# Patient Record
Sex: FEMALE | Race: WHITE | NOT HISPANIC OR LATINO | Employment: PART TIME | URBAN - METROPOLITAN AREA
[De-identification: names, ages, dates, MRNs, and addresses within clinical notes are randomized per-mention and may not be internally consistent; named-entity substitution may affect disease eponyms.]

---

## 2017-09-19 ENCOUNTER — GENERIC CONVERSION - ENCOUNTER (OUTPATIENT)
Dept: OTHER | Facility: OTHER | Age: 32
End: 2017-09-19

## 2017-10-24 ENCOUNTER — GENERIC CONVERSION - ENCOUNTER (OUTPATIENT)
Dept: OTHER | Facility: OTHER | Age: 32
End: 2017-10-24

## 2017-11-21 ENCOUNTER — ALLSCRIPTS OFFICE VISIT (OUTPATIENT)
Dept: OTHER | Facility: OTHER | Age: 32
End: 2017-11-21

## 2017-11-22 NOTE — PROCEDURES
Assessment  1  Amenorrhea (626 0) (N91 2)    Discussion/Summary  Discussion Summary:   LMP was 09/15/2017, due date is 06/22/2018  Active Problems  1  Encounter for gynecological examination with abnormal finding (V72 31) (Z01 411)   2  Encounter for routine gynecological examination (V72 31) (Z01 419)   3  Migraines (346 90) (G43 909)   4  Oral contraceptive prescribed (V25 01) (Z30 011)   5  Oral contraceptives use   6  Screening for HPV (human papillomavirus) (V73 81) (Z11 51)   7  Vulvar dystrophy (624 09) (N90 4)   8  Vulvar itching (698 1) (L29 2)    Current Meds    1  Butalbital-APAP-Caffeine -40 MG Oral Capsule (Fioricet); 1-2 tabs every 4 - 6 hours as needed for mild to moderate headache; Therapy: 47RWS0860 to (Last NX:12PEY3023)  Requested for: 24Oct2017 Ordered    2  Fioricet -40 MG Oral Capsule (Butalbital-APAP-Caffeine); 1 tab q 6 hrs prn migraine; Therapy: 57UCZ4887 to Recorded    3  Prenatal TABS; Therapy: (Recorded:74Zge3393) to Recorded    Allergies  1  Erythromycin GEL    2  Pollen   3  Seasonal    Results/Data  [address] Transvaginal Ultrasound [facility]:  Procedure: [id number] pregnant uterus real time with image documentation, fetal and maternal evaluation, first trimester (<14 weeks 0 days), transvaginal approach: single or first gestation  -- The study was done today in the office  Indication: EDC gestational age 10 3/6 weeks  Exam indication: amenorrhea  Findings:  Number of gestational sacs and fetuses: one  Gestational sac/fetal measurements appropriate for gestation: CRL is 2 46 cm equals 9 1/7 weeks with due date of 06/25/2018  Survey of visible fetal and placental anatomic structure cardiac motion is present  Qualitative assessment of amniotic fluid volume/gestational sac shape: nl   Exam of maternal uterus and adnexa: nl   Impression: viable IUP        Signatures   Electronically signed by : BARRON Navarrete ; Nov 21 2017  1:48PM EST (Author)

## 2017-11-29 ENCOUNTER — GENERIC CONVERSION - ENCOUNTER (OUTPATIENT)
Dept: OTHER | Facility: OTHER | Age: 32
End: 2017-11-29

## 2017-12-12 ENCOUNTER — APPOINTMENT (OUTPATIENT)
Dept: LAB | Facility: MEDICAL CENTER | Age: 32
End: 2017-12-12
Payer: COMMERCIAL

## 2017-12-12 ENCOUNTER — ALLSCRIPTS OFFICE VISIT (OUTPATIENT)
Dept: OTHER | Facility: OTHER | Age: 32
End: 2017-12-12

## 2017-12-12 ENCOUNTER — GENERIC CONVERSION - ENCOUNTER (OUTPATIENT)
Dept: OTHER | Facility: OTHER | Age: 32
End: 2017-12-12

## 2017-12-12 DIAGNOSIS — Z34.90 ENCOUNTER FOR SUPERVISION OF NORMAL PREGNANCY: ICD-10-CM

## 2017-12-12 LAB
BACTERIA UR QL AUTO: ABNORMAL /HPF
BASOPHILS # BLD AUTO: 0.06 THOUSANDS/ΜL (ref 0–0.1)
BASOPHILS NFR BLD AUTO: 1 % (ref 0–1)
BILIRUB UR QL STRIP: NEGATIVE
CLARITY UR: ABNORMAL
COLOR UR: YELLOW
EOSINOPHIL # BLD AUTO: 0.46 THOUSAND/ΜL (ref 0–0.61)
EOSINOPHIL NFR BLD AUTO: 4 % (ref 0–6)
ERYTHROCYTE [DISTWIDTH] IN BLOOD BY AUTOMATED COUNT: 13 % (ref 11.6–15.1)
GLUCOSE UR STRIP-MCNC: NEGATIVE MG/DL
HCT VFR BLD AUTO: 42.3 % (ref 34.8–46.1)
HGB BLD-MCNC: 14.4 G/DL (ref 11.5–15.4)
HGB UR QL STRIP.AUTO: NEGATIVE
HYALINE CASTS #/AREA URNS LPF: ABNORMAL /LPF
KETONES UR STRIP-MCNC: ABNORMAL MG/DL
LEUKOCYTE ESTERASE UR QL STRIP: ABNORMAL
LYMPHOCYTES # BLD AUTO: 2.18 THOUSANDS/ΜL (ref 0.6–4.47)
LYMPHOCYTES NFR BLD AUTO: 17 % (ref 14–44)
MCH RBC QN AUTO: 30.6 PG (ref 26.8–34.3)
MCHC RBC AUTO-ENTMCNC: 34 G/DL (ref 31.4–37.4)
MCV RBC AUTO: 90 FL (ref 82–98)
MONOCYTES # BLD AUTO: 1.03 THOUSAND/ΜL (ref 0.17–1.22)
MONOCYTES NFR BLD AUTO: 8 % (ref 4–12)
NEUTROPHILS # BLD AUTO: 9.2 THOUSANDS/ΜL (ref 1.85–7.62)
NEUTS SEG NFR BLD AUTO: 70 % (ref 43–75)
NITRITE UR QL STRIP: NEGATIVE
NON-SQ EPI CELLS URNS QL MICRO: ABNORMAL /HPF
NRBC BLD AUTO-RTO: 0 /100 WBCS
PH UR STRIP.AUTO: 7.5 [PH] (ref 4.5–8)
PLATELET # BLD AUTO: 306 THOUSANDS/UL (ref 149–390)
PMV BLD AUTO: 9.7 FL (ref 8.9–12.7)
PROT UR STRIP-MCNC: ABNORMAL MG/DL
RBC # BLD AUTO: 4.7 MILLION/UL (ref 3.81–5.12)
RBC #/AREA URNS AUTO: ABNORMAL /HPF
RUBV IGG SERPL IA-ACNC: >175 IU/ML
SP GR UR STRIP.AUTO: 1.02 (ref 1–1.03)
UROBILINOGEN UR QL STRIP.AUTO: 1 E.U./DL
WBC # BLD AUTO: 12.98 THOUSAND/UL (ref 4.31–10.16)
WBC #/AREA URNS AUTO: ABNORMAL /HPF

## 2017-12-12 PROCEDURE — 87147 CULTURE TYPE IMMUNOLOGIC: CPT

## 2017-12-12 PROCEDURE — 36415 COLL VENOUS BLD VENIPUNCTURE: CPT

## 2017-12-12 PROCEDURE — 87086 URINE CULTURE/COLONY COUNT: CPT

## 2017-12-12 PROCEDURE — 87077 CULTURE AEROBIC IDENTIFY: CPT

## 2017-12-12 PROCEDURE — 80081 OBSTETRIC PANEL INC HIV TSTG: CPT

## 2017-12-12 PROCEDURE — 81001 URINALYSIS AUTO W/SCOPE: CPT

## 2017-12-13 ENCOUNTER — GENERIC CONVERSION - ENCOUNTER (OUTPATIENT)
Dept: OTHER | Facility: OTHER | Age: 32
End: 2017-12-13

## 2017-12-13 ENCOUNTER — LAB REQUISITION (OUTPATIENT)
Dept: LAB | Facility: HOSPITAL | Age: 32
End: 2017-12-13
Payer: COMMERCIAL

## 2017-12-13 DIAGNOSIS — Z34.90 ENCOUNTER FOR SUPERVISION OF NORMAL PREGNANCY: ICD-10-CM

## 2017-12-13 LAB
ABO GROUP BLD: NORMAL
BACTERIA UR CULT: ABNORMAL
BACTERIA UR CULT: ABNORMAL
BLD GP AB SCN SERPL QL: NEGATIVE
HBV SURFACE AG SER QL: NORMAL
RH BLD: POSITIVE
RPR SER QL: NORMAL
SPECIMEN EXPIRATION DATE: NORMAL

## 2017-12-13 PROCEDURE — G0145 SCR C/V CYTO,THINLAYER,RESCR: HCPCS | Performed by: OBSTETRICS & GYNECOLOGY

## 2017-12-13 PROCEDURE — 87624 HPV HI-RISK TYP POOLED RSLT: CPT | Performed by: OBSTETRICS & GYNECOLOGY

## 2017-12-14 ENCOUNTER — GENERIC CONVERSION - ENCOUNTER (OUTPATIENT)
Dept: OTHER | Facility: OTHER | Age: 32
End: 2017-12-14

## 2017-12-14 LAB — HIV 1+2 AB+HIV1 P24 AG SERPL QL IA: NORMAL

## 2017-12-14 PROCEDURE — 87591 N.GONORRHOEAE DNA AMP PROB: CPT | Performed by: OBSTETRICS & GYNECOLOGY

## 2017-12-14 PROCEDURE — 87491 CHLMYD TRACH DNA AMP PROBE: CPT | Performed by: OBSTETRICS & GYNECOLOGY

## 2017-12-15 LAB
CHLAMYDIA DNA CVX QL NAA+PROBE: NORMAL
N GONORRHOEA DNA GENITAL QL NAA+PROBE: NORMAL

## 2017-12-19 ENCOUNTER — ALLSCRIPTS OFFICE VISIT (OUTPATIENT)
Dept: PERINATAL CARE | Facility: MEDICAL CENTER | Age: 32
End: 2017-12-19
Payer: COMMERCIAL

## 2017-12-19 ENCOUNTER — GENERIC CONVERSION - ENCOUNTER (OUTPATIENT)
Dept: OTHER | Facility: OTHER | Age: 32
End: 2017-12-19

## 2017-12-19 LAB — HPV RRNA GENITAL QL NAA+PROBE: NORMAL

## 2017-12-19 PROCEDURE — 76813 OB US NUCHAL MEAS 1 GEST: CPT | Performed by: OBSTETRICS & GYNECOLOGY

## 2017-12-20 LAB
LAB AP GYN PRIMARY INTERPRETATION: NORMAL
LAB AP LMP: NORMAL
Lab: NORMAL

## 2018-01-10 ENCOUNTER — APPOINTMENT (OUTPATIENT)
Dept: LAB | Facility: MEDICAL CENTER | Age: 33
End: 2018-01-10
Payer: COMMERCIAL

## 2018-01-10 ENCOUNTER — TRANSCRIBE ORDERS (OUTPATIENT)
Dept: ADMINISTRATIVE | Facility: HOSPITAL | Age: 33
End: 2018-01-10

## 2018-01-10 ENCOUNTER — GENERIC CONVERSION - ENCOUNTER (OUTPATIENT)
Dept: OTHER | Facility: OTHER | Age: 33
End: 2018-01-10

## 2018-01-10 DIAGNOSIS — Z3A.16 16 WEEKS GESTATION OF PREGNANCY: Primary | ICD-10-CM

## 2018-01-10 PROCEDURE — 36415 COLL VENOUS BLD VENIPUNCTURE: CPT | Performed by: OBSTETRICS & GYNECOLOGY

## 2018-01-11 LAB — SCAN RESULT: NORMAL

## 2018-01-11 NOTE — MISCELLANEOUS
Message  Patient called, she is very early in pregnancy, has not had her 1st appt yet  She has a history of migraines, her PCP treated her with sumatriptan  She knows she can not take that during pregnancy  She has had a bad migraine for 2 days now and is vomiting from the migraine  She called asking for something for her migraine  Her PCP said she needed to check with us  I spoke with Dr Bharti Quintero, he said to give her Fioricet  Dr Mabel Landaverde put the order in for this  I spoke with patient, I told her if this does not break her migraine to go to the ER  Active Problems    1  Encounter for gynecological examination with abnormal finding (V72 31) (Z01 411)   2  Encounter for routine gynecological examination (V72 31) (Z01 419)   3  Migraines (346 90) (G43 909)   4  Oral contraceptive prescribed (V25 01) (Z30 011)   5  Oral contraceptives use   6  Screening for HPV (human papillomavirus) (V73 81) (Z11 51)   7  Vulvar dystrophy (624 09) (N90 4)   8  Vulvar itching (698 1) (L29 2)    Current Meds   1  Butalbital-APAP-Caffeine -40 MG Oral Capsule (Fioricet); 1-2 tabs every 4 - 6   hours as needed for mild to moderate headache; Therapy: 65OET8921 to (Last RE:27EXU6973)  Requested for: 24Oct2017 Ordered   2  Fioricet -40 MG Oral Capsule (Butalbital-APAP-Caffeine); 1 tab q 6 hrs prn   migraine; Therapy: 05BHU6238 to Recorded   3  Prenatal TABS; Therapy: (Recorded:72Eul6555) to Recorded    Allergies    1  Erythromycin GEL    2  Pollen   3   Seasonal    Signatures   Electronically signed by : Brady Ruano, ; Oct 24 2017 11:09AM EST                       (Author)

## 2018-01-12 VITALS
DIASTOLIC BLOOD PRESSURE: 72 MMHG | BODY MASS INDEX: 34.27 KG/M2 | HEIGHT: 59 IN | SYSTOLIC BLOOD PRESSURE: 128 MMHG | WEIGHT: 170 LBS

## 2018-01-13 NOTE — MISCELLANEOUS
Message   Recorded as Task   Date: 11/29/2017 01:22 PM, Created By: William Ellis   Task Name: Follow Up   Assigned To: William Ellis   Regarding Patient: Darío Bowman, Status: Active   CommentAlexia Reetegan - 29 Nov 2017 1:22 PM     TASK CREATED  Caller: Self; [phone] (Home); [phone] (Work)  pt needs refill on fioricet, please advise if ok   Kizzy Beatty - 29 Nov 2017 3:35 PM     TASK REPLIED TO: Previously Assigned To Kizzy Beatty  We can give her 20 tabs - should discuss with CG at her PN-1  Active Problems    1  Amenorrhea (626 0) (N91 2)   2  Encounter for gynecological examination with abnormal finding (V72 31) (Z01 411)   3  Encounter for routine gynecological examination (V72 31) (Z01 419)   4  Migraines (346 90) (G43 909)   5  Oral contraceptive prescribed (V25 01) (Z30 011)   6  Oral contraceptives use   7  Screening for HPV (human papillomavirus) (V73 81) (Z11 51)   8  Vulvar dystrophy (624 09) (N90 4)   9  Vulvar itching (698 1) (L29 2)    Current Meds   1  Butalbital-APAP-Caffeine -40 MG Oral Capsule (Fioricet); 1-2 tabs every 4 - 6   hours as needed for mild to moderate headache; Therapy: 31MTY4104 to (Last SY:23EFM9428)  Requested for: 24Oct2017 Ordered   2  Fioricet -40 MG Oral Capsule; 1 tab q 6 hrs prn migraine; Therapy: 41YCP6012 to Recorded   3  Prenatal TABS; Therapy: (Recorded:98Xzn7646) to Recorded    Allergies    1  Erythromycin GEL    2  Pollen   3   Seasonal    Plan  PMH: History of migraine    · From  Fioricet -40 MG Oral Capsule 1 tab q 6 hrs prn migraine To  Butalbital-APAP-Caffeine -40 MG Oral Capsule (Fioricet) 1 tab q 6 hrs prn  migraine    Signatures   Electronically signed by : Rachele Cannon, ; Nov 29 2017  3:43PM EST                       (Author)

## 2018-01-15 ENCOUNTER — APPOINTMENT (OUTPATIENT)
Dept: RADIOLOGY | Facility: MEDICAL CENTER | Age: 33
End: 2018-01-15
Payer: COMMERCIAL

## 2018-01-15 ENCOUNTER — TRANSCRIBE ORDERS (OUTPATIENT)
Dept: ADMINISTRATIVE | Facility: HOSPITAL | Age: 33
End: 2018-01-15

## 2018-01-15 ENCOUNTER — GENERIC CONVERSION - ENCOUNTER (OUTPATIENT)
Dept: OTHER | Facility: OTHER | Age: 33
End: 2018-01-15

## 2018-01-15 DIAGNOSIS — J20.9 ACUTE BRONCHITIS, UNSPECIFIED ORGANISM: Primary | ICD-10-CM

## 2018-01-15 PROCEDURE — 71046 X-RAY EXAM CHEST 2 VIEWS: CPT

## 2018-01-17 ENCOUNTER — GENERIC CONVERSION - ENCOUNTER (OUTPATIENT)
Dept: OTHER | Facility: OTHER | Age: 33
End: 2018-01-17

## 2018-01-22 VITALS
DIASTOLIC BLOOD PRESSURE: 82 MMHG | HEIGHT: 59 IN | BODY MASS INDEX: 33.47 KG/M2 | WEIGHT: 166 LBS | SYSTOLIC BLOOD PRESSURE: 120 MMHG

## 2018-01-23 NOTE — MISCELLANEOUS
Message   Recorded as Task   Date: 12/14/2017 02:41 PM, Created By: Nini Chiu   Task Name: Result Follow Up   Assigned To: LARRY OB,Team   Regarding Patient: Rosalino Rosenberg, Status: In Progress   Eloy Chavez - 14 Dec 2017 2:41 PM     TASK CREATED  tell patient GBS in urine  Rx PenVK was written   Chasity December - 14 Dec 2017 2:45 PM     TASK IN PROGRESS   Chasity December - 14 Dec 2017 2:52 PM     TASK EDITED  Pt aware  I called pharmacy also - rx sent for 1 tablet - not 21  Will give pt 21 tabs  Active Problems    1  Amenorrhea (626 0) (N91 2)   2  Encounter for gynecological examination with abnormal finding (V72 31) (Z01 411)   3  Encounter for routine gynecological examination (V72 31) (Z01 419)   4  GBS (group B streptococcus) UTI complicating pregnancy (354 70,174 8,444 13)   (O23 40,B95 1)   5  Migraines (346 90) (G43 909)   6  Oral contraceptive prescribed (V25 01) (Z30 011)   7  Oral contraceptives use   8  Pregnancy, obstetrical care (V22 1) (Z34 90)   9  Screening for HPV (human papillomavirus) (V73 81) (Z11 51)   10  Vulvar dystrophy (624 09) (N90 4)   11  Vulvar itching (698 1) (L29 2)    Current Meds   1  Butalbital-APAP-Caffeine -40 MG Oral Capsule (Fioricet); 1 tab q 6 hrs prn   migraine; Therapy: 49OFR2990 to (Last Rx:30Nov2017)  Requested for: 58UJO3242 Ordered   2  Butalbital-APAP-Caffeine -40 MG Oral Capsule (Fioricet); 1-2 tabs every 4 - 6   hours as needed for mild to moderate headache; Therapy: 26POW2473 to (Last NM:38UUG5595)  Requested for: 24Oct2017 Ordered   3  Penicillin V Potassium 500 MG Oral Tablet; TAKE 1 TABLET 3 times daily; Therapy: 06CCR2938 to (Evaluate:91Der8545)  Requested for: 43PCV3459; Last   Rx:60Jme9991 Ordered   4  Prenatal TABS; Therapy: (Recorded:43Qai3216) to Recorded    Allergies    1  Erythromycin GEL    2  Pollen   3  Seasonal    Signatures   Electronically signed by :  Roshni Lazo, ; Dec 14 2017 2:52PM EST                       (Author)

## 2018-01-23 NOTE — RESULT NOTES
Verified Results  (1) SEQUENTIAL SCREEN 2 66JDU4995 01:46PM Apollo Garcia     Test Name Result Flag Reference   SCAN RESULT      Results available in the [facility], [facility]

## 2018-01-23 NOTE — PROGRESS NOTES
DEC 19 2017         RE: Suzan Diaz                              To: [facility]  MR#: [id number]                                    [address]  : APR Niyah 34: [id number] Rojas María U  6    (Exam #: [id number])                           Fax: [phone]      The LMP of this 34year old,  G2, P1-0-0-1 patient was SEP 13 2017, giving   her an DEUCE of 2018 and a current gestational age of 17 weeks 4 days   by dates  A sonographic examination was performed on DEC 19 2017 using   real time equipment  The ultrasound examination was performed using   abdominal technique  The patient has a BMI of 33 9  Her blood pressure   today was 129/81  Earliest US on record:  17  9W1D  17  DEUCE        Silvia Thomas is on prenatal vitamins daily and Fioricet tablets as needed   for migraines  She reports an allergy to erythromycin causes hives  She   denies any past medical other then migraines, past surgical or 1st   generation family history of hypertension, diabetes, thrombosis or   congenital anomalies  Her OB history includes a term normal vaginal   delivery weighing 7 pound 9 ounces in   She denies any use of   cigarettes, alcohol or illicit drugs  She is here today for genetic   screening  She had treatment in this pregnancy for GBS in the urine with   cefaclor 500 milligrams b i d             Multiple longitudinal and transverse sections revealed a durán   intrauterine pregnancy with the fetus in breech presentation  The placenta   is anterior in implantation, grade I in appearance        Cardiac motion was observed at 152 bpm       INDICATIONS      first trimester genetic screening   obesity      Exam Types      Level I      RESULTS      Fetus # 1 of 1   Breech presentation   Fetal growth appeared normal      MEASUREMENTS (* Included In Average GA)      CRL              7 9 cm        13 weeks 4 days*   Nuchal Trans    1 60 mm      THE AVERAGE GESTATIONAL AGE is 13 weeks 4 days +/- 7 days  ANATOMY COMMENTS      Anatomic detail is limited at this gestational age  The fetal cranium   appeared normal in shape and the nuchal translucency was normal in size   (1 6mm)  The intracranial anatomy was unremarkable  Evaluation of the   spine revealed no obvious evidence for a neural tube defect  Anatomy of   the fetal thorax appeared within normal limits with a normal cardiac axis   and first trimester three vessel view  The cardiac rhythm was regular and   documented with M-mode  Within the abdomen, the stomach & bladder were   visualized and the abdominal wall appeared intact  The views of the   nasal bone and the three vessel cord  were not seen due to fetal position  Active movement of the fetal body & extremities was seen  There is no   suspicion of a subchorionic bleed  The placental cord insertion appeared   normal    There is no suspicion of a uterine myoma  Free fluid is not seen   in the posterior cul-de-sac  ADNEXA      The left ovary appeared normal and measured 2 6 x 2 5 x 1 5 cm with a   volume of 5 1 cc  The right ovary appeared normal and measured 2 4 x 2 6 x   1 3 cm with a volume of 4 2 cc  AMNIOTIC FLUID         Largest Vertical Pocket = 4 5 cm   Amniotic Fluid: Normal      IMPRESSION      Tucker IUP   13 weeks and 4 days by this ultrasound  (DEUCE=2018)   Breech presentation   Fetal growth appeared normal   Regular fetal heart rate of 152 bpm   Anterior placenta      [facility]      Dear Dr Raisa Becerril      Thank you for requesting a  consultation on your patient Ms Jordan for the following indications: sequential screen      The patient was informed of the findings and counseled about the   limitations of the exam in detecting all forms of fetal congenital   abnormalities        She denies any vaginal bleeding or uterine cramping/contractions  Today's ultrasound findings and suggested follow-up were discussed in   detail with the patient  The Sequential Screen was discussed in detail,   including the sensitivities for detection of Down syndrome, Trisomy 18,   and open neural tube defects  The patient underwent fingerstick blood   collection for hCG and ZENIA-A to complete the initial component of the   Sequential Screen  Results should be available within one week  Follow up recommended:   1  Follow-up multiple marker serum screening at 16-18 weeks' gestation is   recommended to complete the Sequential Screen  2  Fetal Level II ultrasound imaging is recommended at 19-20 weeks'   gestation  AVELINA Branch M D     Maternal-Fetal Medicine   Electronically signed 12/19/17 12:50

## 2018-01-24 VITALS
SYSTOLIC BLOOD PRESSURE: 120 MMHG | DIASTOLIC BLOOD PRESSURE: 76 MMHG | HEIGHT: 59 IN | WEIGHT: 170 LBS | BODY MASS INDEX: 34.27 KG/M2

## 2018-01-24 VITALS
SYSTOLIC BLOOD PRESSURE: 110 MMHG | DIASTOLIC BLOOD PRESSURE: 70 MMHG | WEIGHT: 168.25 LBS | HEIGHT: 59 IN | BODY MASS INDEX: 33.92 KG/M2

## 2018-01-24 VITALS
SYSTOLIC BLOOD PRESSURE: 129 MMHG | DIASTOLIC BLOOD PRESSURE: 81 MMHG | WEIGHT: 168.01 LBS | HEIGHT: 59 IN | HEART RATE: 84 BPM | BODY MASS INDEX: 33.87 KG/M2

## 2018-01-24 VITALS
BODY MASS INDEX: 33.87 KG/M2 | DIASTOLIC BLOOD PRESSURE: 72 MMHG | HEIGHT: 59 IN | RESPIRATION RATE: 14 BRPM | SYSTOLIC BLOOD PRESSURE: 118 MMHG | WEIGHT: 168 LBS

## 2018-01-28 ENCOUNTER — APPOINTMENT (EMERGENCY)
Dept: CT IMAGING | Facility: HOSPITAL | Age: 33
End: 2018-01-28
Payer: COMMERCIAL

## 2018-01-28 ENCOUNTER — APPOINTMENT (EMERGENCY)
Dept: RADIOLOGY | Facility: HOSPITAL | Age: 33
End: 2018-01-28
Payer: COMMERCIAL

## 2018-01-28 ENCOUNTER — HOSPITAL ENCOUNTER (EMERGENCY)
Facility: HOSPITAL | Age: 33
Discharge: HOME | End: 2018-01-28
Attending: EMERGENCY MEDICINE | Admitting: EMERGENCY MEDICINE
Payer: COMMERCIAL

## 2018-01-28 VITALS
OXYGEN SATURATION: 99 % | HEIGHT: 60 IN | SYSTOLIC BLOOD PRESSURE: 115 MMHG | HEART RATE: 108 BPM | WEIGHT: 174.16 LBS | TEMPERATURE: 97.9 F | DIASTOLIC BLOOD PRESSURE: 68 MMHG | BODY MASS INDEX: 34.19 KG/M2 | RESPIRATION RATE: 18 BRPM

## 2018-01-28 DIAGNOSIS — J40 BRONCHITIS: Primary | ICD-10-CM

## 2018-01-28 LAB
ALBUMIN SERPL BCP-MCNC: 2.7 G/DL (ref 3.5–5)
ALP SERPL-CCNC: 71 U/L (ref 46–116)
ALT SERPL W P-5'-P-CCNC: 16 U/L (ref 12–78)
ANION GAP SERPL CALCULATED.3IONS-SCNC: 11 MMOL/L (ref 4–13)
APTT PPP: 27 SECONDS (ref 23–35)
AST SERPL W P-5'-P-CCNC: 12 U/L (ref 5–45)
ATRIAL RATE: 98 BPM
BASOPHILS # BLD AUTO: 0.06 THOUSANDS/ΜL (ref 0–0.1)
BASOPHILS NFR BLD AUTO: 0 % (ref 0–1)
BILIRUB SERPL-MCNC: 0.3 MG/DL (ref 0.2–1)
BUN SERPL-MCNC: 5 MG/DL (ref 5–25)
CALCIUM SERPL-MCNC: 8.8 MG/DL (ref 8.3–10.1)
CHLORIDE SERPL-SCNC: 104 MMOL/L (ref 100–108)
CK SERPL-CCNC: 112 U/L (ref 26–192)
CO2 SERPL-SCNC: 23 MMOL/L (ref 21–32)
CREAT SERPL-MCNC: 0.61 MG/DL (ref 0.6–1.3)
DEPRECATED D DIMER PPP: 465 NG/ML (FEU) (ref 0–424)
EOSINOPHIL # BLD AUTO: 0.75 THOUSAND/ΜL (ref 0–0.61)
EOSINOPHIL NFR BLD AUTO: 5 % (ref 0–6)
ERYTHROCYTE [DISTWIDTH] IN BLOOD BY AUTOMATED COUNT: 14.1 % (ref 11.6–15.1)
FLUAV AG SPEC QL IA: NEGATIVE
FLUAV AG SPEC QL: NORMAL
FLUBV AG SPEC QL IA: NEGATIVE
FLUBV AG SPEC QL: NORMAL
GFR SERPL CREATININE-BSD FRML MDRD: 119 ML/MIN/1.73SQ M
GLUCOSE SERPL-MCNC: 105 MG/DL (ref 65–140)
HCT VFR BLD AUTO: 36.8 % (ref 34.8–46.1)
HGB BLD-MCNC: 12.4 G/DL (ref 11.5–15.4)
INR PPP: 0.83 (ref 0.86–1.16)
LACTATE SERPL-SCNC: 1.4 MMOL/L (ref 0.5–2)
LYMPHOCYTES # BLD AUTO: 2.39 THOUSANDS/ΜL (ref 0.6–4.47)
LYMPHOCYTES NFR BLD AUTO: 15 % (ref 14–44)
MCH RBC QN AUTO: 30.4 PG (ref 26.8–34.3)
MCHC RBC AUTO-ENTMCNC: 33.7 G/DL (ref 31.4–37.4)
MCV RBC AUTO: 90 FL (ref 82–98)
MONOCYTES # BLD AUTO: 0.97 THOUSAND/ΜL (ref 0.17–1.22)
MONOCYTES NFR BLD AUTO: 6 % (ref 4–12)
NEUTROPHILS # BLD AUTO: 11.62 THOUSANDS/ΜL (ref 1.85–7.62)
NEUTS SEG NFR BLD AUTO: 74 % (ref 43–75)
P AXIS: 43 DEGREES
PLATELET # BLD AUTO: 274 THOUSANDS/UL (ref 149–390)
PMV BLD AUTO: 9.1 FL (ref 8.9–12.7)
POTASSIUM SERPL-SCNC: 3.5 MMOL/L (ref 3.5–5.3)
PR INTERVAL: 150 MS
PROT SERPL-MCNC: 6.8 G/DL (ref 6.4–8.2)
PROTHROMBIN TIME: 11.6 SECONDS (ref 12.1–14.4)
QRS AXIS: 40 DEGREES
QRSD INTERVAL: 74 MS
QT INTERVAL: 322 MS
QTC INTERVAL: 403 MS
RBC # BLD AUTO: 4.08 MILLION/UL (ref 3.81–5.12)
RSV B RNA SPEC QL NAA+PROBE: NORMAL
SODIUM SERPL-SCNC: 138 MMOL/L (ref 136–145)
T WAVE AXIS: 36 DEGREES
VENTRICULAR RATE: 94 BPM
WBC # BLD AUTO: 15.79 THOUSAND/UL (ref 4.31–10.16)

## 2018-01-28 PROCEDURE — 85610 PROTHROMBIN TIME: CPT | Performed by: EMERGENCY MEDICINE

## 2018-01-28 PROCEDURE — 80053 COMPREHEN METABOLIC PANEL: CPT | Performed by: EMERGENCY MEDICINE

## 2018-01-28 PROCEDURE — 85730 THROMBOPLASTIN TIME PARTIAL: CPT | Performed by: EMERGENCY MEDICINE

## 2018-01-28 PROCEDURE — 93010 ELECTROCARDIOGRAM REPORT: CPT | Performed by: INTERNAL MEDICINE

## 2018-01-28 PROCEDURE — 83605 ASSAY OF LACTIC ACID: CPT | Performed by: EMERGENCY MEDICINE

## 2018-01-28 PROCEDURE — 99285 EMERGENCY DEPT VISIT HI MDM: CPT

## 2018-01-28 PROCEDURE — 87400 INFLUENZA A/B EACH AG IA: CPT | Performed by: EMERGENCY MEDICINE

## 2018-01-28 PROCEDURE — 87798 DETECT AGENT NOS DNA AMP: CPT | Performed by: EMERGENCY MEDICINE

## 2018-01-28 PROCEDURE — 82550 ASSAY OF CK (CPK): CPT | Performed by: EMERGENCY MEDICINE

## 2018-01-28 PROCEDURE — 71275 CT ANGIOGRAPHY CHEST: CPT

## 2018-01-28 PROCEDURE — 93005 ELECTROCARDIOGRAM TRACING: CPT

## 2018-01-28 PROCEDURE — 96360 HYDRATION IV INFUSION INIT: CPT

## 2018-01-28 PROCEDURE — 71046 X-RAY EXAM CHEST 2 VIEWS: CPT

## 2018-01-28 PROCEDURE — 87040 BLOOD CULTURE FOR BACTERIA: CPT | Performed by: EMERGENCY MEDICINE

## 2018-01-28 PROCEDURE — 85025 COMPLETE CBC W/AUTO DIFF WBC: CPT | Performed by: EMERGENCY MEDICINE

## 2018-01-28 PROCEDURE — 94640 AIRWAY INHALATION TREATMENT: CPT

## 2018-01-28 PROCEDURE — 36415 COLL VENOUS BLD VENIPUNCTURE: CPT | Performed by: EMERGENCY MEDICINE

## 2018-01-28 PROCEDURE — 85379 FIBRIN DEGRADATION QUANT: CPT | Performed by: EMERGENCY MEDICINE

## 2018-01-28 RX ORDER — ALBUTEROL SULFATE 90 UG/1
AEROSOL, METERED RESPIRATORY (INHALATION)
Status: DISCONTINUED
Start: 2018-01-28 | End: 2018-01-28 | Stop reason: HOSPADM

## 2018-01-28 RX ORDER — ALBUTEROL SULFATE 2.5 MG/3ML
2.5 SOLUTION RESPIRATORY (INHALATION) ONCE
Status: COMPLETED | OUTPATIENT
Start: 2018-01-28 | End: 2018-01-28

## 2018-01-28 RX ORDER — CEFADROXIL 500 MG/1
500 CAPSULE ORAL EVERY 12 HOURS SCHEDULED
COMMUNITY
End: 2018-02-08 | Stop reason: ALTCHOICE

## 2018-01-28 RX ORDER — ALBUTEROL SULFATE 90 UG/1
2 AEROSOL, METERED RESPIRATORY (INHALATION) EVERY 6 HOURS PRN
COMMUNITY
End: 2018-10-31 | Stop reason: SDUPTHER

## 2018-01-28 RX ADMIN — SODIUM CHLORIDE 1000 ML: 0.9 INJECTION, SOLUTION INTRAVENOUS at 02:42

## 2018-01-28 RX ADMIN — ALBUTEROL SULFATE 2.5 MG: 2.5 SOLUTION RESPIRATORY (INHALATION) at 00:59

## 2018-01-28 RX ADMIN — IOHEXOL 100 ML: 350 INJECTION, SOLUTION INTRAVENOUS at 04:36

## 2018-01-28 NOTE — ED PROVIDER NOTES
History  Chief Complaint   Patient presents with    Shortness of Breath     Pt  states 19 weeks pregnant and has had bronchitis for over a month, has taken three antibitotics and not better  Tonight states feels as if she has to gasp for air occassionaly  Patient is a 34year old female with "bronchitis" with non productive cough for past month  Is on her third abx with out relief  No fever  (+) sob tonight  Sometimes feels she needs to vomit after coughing  Chest pain only with coughing  No travel  No fever  No vaginal bleeding  Patient is 19 weeks and 2 days pregnant  DEUCE 6/22/18  G2  Was last seen in this ED on 4/7/16 for pharyngitis  [facility] website checked on this patient and no Rx found  No h/o asthma  No smoking  History provided by:  Patient   used: No        Prior to Admission Medications   Prescriptions Last Dose Informant Patient Reported? Taking? Prenatal MV-Min-Fe Fum-FA-DHA (PRENATAL 1 PO)   Yes Yes   Sig: Take by mouth   albuterol (PROVENTIL HFA,VENTOLIN HFA) 90 mcg/act inhaler   Yes Yes   Sig: Inhale 2 puffs every 6 (six) hours as needed for wheezing   cefadroxil (DURICEF) 500 mg capsule   Yes Yes   Sig: Take 500 mg by mouth every 12 (twelve) hours      Facility-Administered Medications: None       History reviewed  No pertinent past medical history  History reviewed  No pertinent surgical history  No family history on file  I have reviewed and agree with the history as documented  Social History   Substance Use Topics    Smoking status: Never Smoker    Smokeless tobacco: Never Used    Alcohol use No        Review of Systems   Constitutional: Negative for fever  Respiratory: Positive for cough and shortness of breath  Cardiovascular: Positive for chest pain (with coughing)  Gastrointestinal: Negative for nausea and vomiting  Genitourinary: Negative for vaginal bleeding  All other systems reviewed and are negative        Physical Exam  ED Triage Vitals [01/28/18 0021]   Temperature Pulse Respirations Blood Pressure SpO2   97 9 °F (36 6 °C) (!) 113 20 119/67 97 %      Temp Source Heart Rate Source Patient Position - Orthostatic VS BP Location FiO2 (%)   Oral Monitor Sitting Right arm --      Pain Score       No Pain           Orthostatic Vital Signs  Vitals:    01/28/18 0021 01/28/18 0135   BP: 119/67 118/62   Pulse: (!) 113 102   Patient Position - Orthostatic VS: Sitting Lying       Physical Exam   Constitutional: She is oriented to person, place, and time  She appears well-developed and well-nourished  She appears distressed (moderate)  HENT:   Head: Normocephalic and atraumatic  Mouth/Throat: Oropharynx is clear and moist    Eyes: No scleral icterus  Neck: Normal range of motion  Neck supple  Cardiovascular: Regular rhythm and normal heart sounds  No murmur heard  Tachycardia  Pulmonary/Chest: No stridor  No respiratory distress  She has no wheezes  She has no rales  Diffuse rhonci  Abdominal: Soft  Bowel sounds are normal  She exhibits distension (gravid uterus c/w dates)  There is no tenderness  Musculoskeletal: She exhibits no edema, tenderness (No calf tenderness) or deformity  Neurological: She is alert and oriented to person, place, and time  Skin: Skin is warm and dry  No rash noted  Psychiatric: She has a normal mood and affect  Nursing note and vitals reviewed        ED Medications  Medications   albuterol (PROVENTIL HFA,VENTOLIN HFA) 90 mcg/act inhaler **AcuDose Override Pull** (not administered)   albuterol inhalation solution 2 5 mg (2 5 mg Nebulization Given 1/28/18 0059)   sodium chloride 0 9 % bolus 1,000 mL (1,000 mL Intravenous New Bag 1/28/18 0242)       Diagnostic Studies  Results Reviewed     Procedure Component Value Units Date/Time    Comprehensive metabolic panel [[id number]]  (Abnormal) Collected:  01/28/18 0111    Lab Status:  Final result Specimen:  Blood from Arm, Left Updated: 01/28/18 0313     Sodium 138 mmol/L      Potassium 3 5 mmol/L      Chloride 104 mmol/L      CO2 23 mmol/L      Anion Gap 11 mmol/L      BUN 5 mg/dL      Creatinine 0 61 mg/dL      Glucose 105 mg/dL      Calcium 8 8 mg/dL      AST 12 U/L      ALT 16 U/L      Alkaline Phosphatase 71 U/L      Total Protein 6 8 g/dL      Albumin 2 7 (L) g/dL      Total Bilirubin 0 30 mg/dL      eGFR 119 ml/min/1 73sq m     Narrative:         National Kidney Disease Education Program recommendations are as follows:  GFR calculation is accurate only with a steady state creatinine  Chronic Kidney disease less than 60 ml/min/1 73 sq  meters  Kidney failure less than 15 ml/min/1 73 sq  meters  Protime-INR [[id number]]  (Abnormal) Collected:  01/28/18 0111    Lab Status:  Final result Specimen:  Blood from Arm, Left Updated:  01/28/18 0313     Protime 11 6 (L) seconds      INR 0 83 (L)    APTT [[id number]]  (Normal) Collected:  01/28/18 0111    Lab Status:  Final result Specimen:  Blood from Arm, Left Updated:  01/28/18 0313     PTT 27 seconds     Narrative: Therapeutic Heparin Range = 60-90 seconds    Lactic acid, plasma [[id number]]  (Normal) Collected:  01/28/18 0111    Lab Status:  Final result Specimen:  Blood from Arm, Left Updated:  01/28/18 0313     LACTIC ACID 1 4 mmol/L     Narrative:         Result may be elevated if tourniquet was used during collection      Rapid Influenza Screen with Reflex PCR (indicated for patients <2mo of age) [[id number]]  (Normal) Collected:  01/28/18 0111    Lab Status:  Final result Specimen:  Nasopharyngeal from Nasopharyngeal Swab Updated:  01/28/18 0313     Rapid Influenza A Ag Negative     Rapid Influenza B Ag Negative    CK Total with Reflex CKMB [[id number]]  (Normal) Collected:  01/28/18 0111    Lab Status:  Final result Specimen:  Blood from Arm, Left Updated:  01/28/18 0313     Total  U/L     D-Dimer [[id number]]  (Abnormal) Collected:  01/28/18 0111    Lab Status:  Final result Specimen: Blood from Arm, Left Updated:  01/28/18 0313     D-Dimer, Quant 465 (H) ng/ml (FEU)     CBC and differential [[id number]]  (Abnormal) Collected:  01/28/18 0111    Lab Status:  Final result Specimen:  Blood from Arm, Left Updated:  01/28/18 0313     WBC 15 79 (H) Thousand/uL      RBC 4 08 Million/uL      Hemoglobin 12 4 g/dL      Hematocrit 36 8 %      MCV 90 fL      MCH 30 4 pg      MCHC 33 7 g/dL      RDW 14 1 %      MPV 9 1 fL      Platelets 907 Thousands/uL      Neutrophils Relative 74 %      Lymphocytes Relative 15 %      Monocytes Relative 6 %      Eosinophils Relative 5 %      Basophils Relative 0 %      Neutrophils Absolute 11 62 (H) Thousands/µL      Lymphocytes Absolute 2 39 Thousands/µL      Monocytes Absolute 0 97 Thousand/µL      Eosinophils Absolute 0 75 (H) Thousand/µL      Basophils Absolute 0 06 Thousands/µL     Influenza A/B and RSV by PCR (Indicated for patients > 2 mo of age) [[id number]] Collected:  01/28/18 0111    Lab Status: In process Specimen:  Nasopharyngeal from Nasopharyngeal Swab Updated:  01/28/18 0202    Blood culture #2 [[id number]] Collected:  01/28/18 0111    Lab Status: In process Specimen:  Blood from Arm, Left Updated:  01/28/18 0116    Blood culture #1 [[id number]]     Lab Status:  No result Specimen:  Blood                  XR chest 2 views   ED Interpretation by Laura Heart MD (01/28 0126)   No acute disease read by me         CTA ED chest PE study    (Results Pending)              Procedures  ECG 12 Lead Documentation  Date/Time: 1/28/2018 1:33 AM  Performed by: Josi Zhu  Authorized by: Josi Zhu     Indications / Diagnosis:  Sob, chest pain, cough  ECG reviewed by me, the ED Provider: yes    Patient location:  ED  Previous ECG:     Previous ECG:  Unavailable  Rate:     ECG rate:  94    ECG rate assessment: normal    Rhythm:     Rhythm: sinus rhythm    Ectopy:     Ectopy: none    QRS:     QRS axis:  Normal  Conduction:     Conduction: normal    ST segments:     ST segments:  Normal  T waves:     T waves: normal    Other findings:     Other findings: poor R wave progression    Comments:      I do not agree with computer reading of cannot rule out anterior infarct, age undetermined  Phone Contacts  ED Phone Contact    ED Course  ED Course as of Jan 28 0414   Sammy Bermudez Jan 28, 2018   3503 WBC=15 79; rest of chemistries WNL; rapid flu negative; D-bpgog=232; lactate normal  Rest of CBC wNL  PT/PTT normal  CK normal      0413 Patient discharged with inhaler albuterol to go and continue abx and f/u with PMD in 2 days  0413 COMPARISON:  DX - XR CHEST PA LATERAL 2018-01-28 01:16  FINDINGS:  Pulmonary arteries: There is no evidence for PE  Aorta: No acute findings  No thoracic aortic aneurysm  Lungs: Peribronchial cuffing is present which is nonspecific, and which may reflect acute or chronic  bronchial inflammation  Alternatively, this may reflect an element of reactive airways disease  Groundglass attenuation may reflect microatelectasis related to bronchial wall thickening     Peripheral septal thickening noted most evident at the bases  The constellation of findings may also  reflect mild interstitial edema  No mass  Pleural space: Unremarkable  No significant effusion  No pneumothorax  Heart: Unremarkable  No cardiomegaly  No significant pericardial effusion  No evidence of RV  dysfunction  Bones/joints: No acute fracture  No dislocation  Soft tissues: Unremarkable  Lymph nodes: Unremarkable  No enlarged lymph nodes  IMPRESSION:  1  Nonspecific peribronchial cuffing  2  Groundglass attenuation may reflect microatelectasis related to bronchial wall thickening  3  Peripheral septal thickening noted most evident at the bases  The constellation of findings may  also reflect mild interstitial edema  Thank you for allowing us to participate in the care of your patient    Dictated and Authenticated by: Renan Motta MD  01/28/2018 3:06 AM [place] Time (Lelo Harper [id number])   CTA ED chest PE study                               MDM  Number of Diagnoses or Management Options  Diagnosis management comments: DDX including but not limited to: pneumonia, pleural effusion, bronchitis, PE, PTX, doubt asthma exacerbation, anemia, metabolic abnormality, renal failure, doubt pre-eclampsia or cardiomyopathy  Amount and/or Complexity of Data Reviewed  Clinical lab tests: ordered and reviewed  Tests in the radiology section of CPT®: ordered and reviewed  Decide to obtain previous medical records or to obtain history from someone other than the patient: yes  Review and summarize past medical records: yes  Independent visualization of images, tracings, or specimens: yes      CritCare Time    Disposition  Final diagnoses:   Bronchitis     Time reflects when diagnosis was documented in both MDM as applicable and the Disposition within this note     Time User Action Codes Description Comment    1/28/2018  4:14 AM Serina Nick Bronchitis       ED Disposition     ED Disposition Condition Comment    Discharge  Risa 1394 discharge to home/self care  Condition at discharge: Stable        Follow-up Information    None       Patient's Medications   Discharge Prescriptions    No medications on file     No discharge procedures on file      ED Provider  Electronically Signed by           Zhanna Pennington MD  01/28/18 0124

## 2018-01-28 NOTE — ED NOTES
Pt ambulatory to restroom at this time, no difficulties        Skye Flannery, RN  01/28/18 Aiyana Hoffman 1 Ayan Mann RN  01/28/18 4604

## 2018-02-02 LAB — BACTERIA BLD CULT: NORMAL

## 2018-02-08 ENCOUNTER — ROUTINE PRENATAL (OUTPATIENT)
Dept: OBGYN CLINIC | Facility: MEDICAL CENTER | Age: 33
End: 2018-02-08

## 2018-02-08 VITALS — BODY MASS INDEX: 34.18 KG/M2 | WEIGHT: 175 LBS | SYSTOLIC BLOOD PRESSURE: 102 MMHG | DIASTOLIC BLOOD PRESSURE: 70 MMHG

## 2018-02-08 DIAGNOSIS — O23.40 GROUP B STREPTOCOCCUS URINARY TRACT INFECTION AFFECTING PREGNANCY, ANTEPARTUM: ICD-10-CM

## 2018-02-08 DIAGNOSIS — B95.1 GROUP B STREPTOCOCCUS URINARY TRACT INFECTION AFFECTING PREGNANCY, ANTEPARTUM: ICD-10-CM

## 2018-02-08 DIAGNOSIS — Z34.82 ENCOUNTER FOR SUPERVISION OF NORMAL PREGNANCY IN MULTIGRAVIDA IN SECOND TRIMESTER: Primary | ICD-10-CM

## 2018-02-08 PROBLEM — N91.2 AMENORRHEA: Status: RESOLVED | Noted: 2017-11-21 | Resolved: 2018-02-08

## 2018-02-08 PROBLEM — N91.2 AMENORRHEA: Status: ACTIVE | Noted: 2017-11-21

## 2018-02-08 PROBLEM — N90.4 VULVAR DYSTROPHY: Status: RESOLVED | Noted: 2017-09-19 | Resolved: 2018-02-08

## 2018-02-08 PROBLEM — N90.4 VULVAR DYSTROPHY: Status: ACTIVE | Noted: 2017-09-19

## 2018-02-08 PROBLEM — G43.909 MIGRAINES: Status: ACTIVE | Noted: 2017-10-24

## 2018-02-08 PROCEDURE — PNV: Performed by: NURSE PRACTITIONER

## 2018-02-08 RX ORDER — AMOXICILLIN AND CLAVULANATE POTASSIUM 500; 125 MG/1; MG/1
TABLET, FILM COATED ORAL
Refills: 0 | COMMUNITY
Start: 2018-01-03 | End: 2018-02-08 | Stop reason: ALTCHOICE

## 2018-02-08 RX ORDER — PENICILLIN V POTASSIUM 500 MG/1
500 TABLET ORAL 3 TIMES DAILY
Refills: 0 | COMMUNITY
Start: 2017-12-14 | End: 2018-02-08 | Stop reason: ALTCHOICE

## 2018-02-08 RX ORDER — CEFACLOR 500 MG
500 CAPSULE ORAL 2 TIMES DAILY
Refills: 0 | COMMUNITY
Start: 2018-02-02 | End: 2018-03-29

## 2018-02-08 RX ORDER — .BETA.-CAROTENE, ASCORBIC ACID, CHOLECALCIFEROL, .ALPHA.-TOCOPHEROL ACETATE, DL-, THIAMINE MONONITRATE, RIBOFLAVIN, NIACINAMIDE, PYRIDOXINE HYDROCHLORIDE, FOLIC ACID, CYANOCOBALAMIN, CALCIUM PANTOTHENATE, CALCIUM CARBONATE, FERROUS FUMARATE, ZINC OXIDE, AND DOCUSATE SODIUM 1000; 100; 400; 30; 3; 3; 15; 20; 1; 12; 7; 200; 29; 20; 25 [IU]/1; MG/1; [IU]/1; [IU]/1; MG/1; MG/1; MG/1; MG/1; MG/1; UG/1; MG/1; MG/1; MG/1; MG/1; MG/1
1 TABLET, COATED ORAL DAILY
COMMUNITY
End: 2018-10-31 | Stop reason: SDUPTHER

## 2018-02-08 RX ORDER — BUTALBITAL, ACETAMINOPHEN AND CAFFEINE 300; 40; 50 MG/1; MG/1; MG/1
CAPSULE ORAL
Refills: 0 | COMMUNITY
Start: 2017-11-30 | End: 2018-07-25

## 2018-02-08 NOTE — PROGRESS NOTES
Problem List Items Addressed This Visit     GBS (group B streptococcus) UTI complicating pregnancy     Given DAVE lab slip today s/p GBS UTI tx         Relevant Medications    cefaclor (CECLOR) 500 mg capsule    Other Relevant Orders    Urine culture    Encounter for supervision of normal pregnancy in multigravida in second trimester - Primary     Good fetal movement  Denies LOF/VB/CTX  Continues to ramos bronchitis, recently seen in ER  On Cefaclor and Albuterol  Advised plenty of fluids  L2 US is scheduled               Maria Angelucci

## 2018-02-08 NOTE — ASSESSMENT & PLAN NOTE
Good fetal movement  Denies LOF/VB/CTX  Continues to ramos bronchitis, recently seen in ER  On Cefaclor and Albuterol  Advised plenty of fluids  L2 US is scheduled

## 2018-02-09 ENCOUNTER — LAB (OUTPATIENT)
Dept: LAB | Facility: MEDICAL CENTER | Age: 33
End: 2018-02-09
Payer: COMMERCIAL

## 2018-02-09 ENCOUNTER — ROUTINE PRENATAL (OUTPATIENT)
Dept: PERINATAL CARE | Facility: MEDICAL CENTER | Age: 33
End: 2018-02-09
Payer: COMMERCIAL

## 2018-02-09 VITALS
DIASTOLIC BLOOD PRESSURE: 77 MMHG | WEIGHT: 175 LBS | BODY MASS INDEX: 34.36 KG/M2 | HEART RATE: 99 BPM | SYSTOLIC BLOOD PRESSURE: 115 MMHG | HEIGHT: 60 IN

## 2018-02-09 DIAGNOSIS — Z36.86 ENCOUNTER FOR ANTENATAL SCREENING FOR CERVICAL LENGTH: ICD-10-CM

## 2018-02-09 DIAGNOSIS — B95.1 GROUP B STREPTOCOCCUS URINARY TRACT INFECTION AFFECTING PREGNANCY, ANTEPARTUM: ICD-10-CM

## 2018-02-09 DIAGNOSIS — O23.40 GROUP B STREPTOCOCCUS URINARY TRACT INFECTION AFFECTING PREGNANCY, ANTEPARTUM: ICD-10-CM

## 2018-02-09 DIAGNOSIS — O99.212 OBESITY AFFECTING PREGNANCY IN SECOND TRIMESTER: Primary | ICD-10-CM

## 2018-02-09 PROCEDURE — 76817 TRANSVAGINAL US OBSTETRIC: CPT | Performed by: OBSTETRICS & GYNECOLOGY

## 2018-02-09 PROCEDURE — 87086 URINE CULTURE/COLONY COUNT: CPT

## 2018-02-09 PROCEDURE — 76811 OB US DETAILED SNGL FETUS: CPT | Performed by: OBSTETRICS & GYNECOLOGY

## 2018-02-09 PROCEDURE — 99212 OFFICE O/P EST SF 10 MIN: CPT | Performed by: OBSTETRICS & GYNECOLOGY

## 2018-02-09 NOTE — LETTER
February 9, 2018     Mic Wilson [address]    Patient: Garry Antony   YOB: 1985   Date of Visit: 2/9/2018       Dear Dr Dixie Schumacher: Thank you for referring Jessica Hope to me for evaluation  Below are my notes for this consultation  If you have questions, please do not hesitate to call me  I look forward to following your patient along with you           Sincerely,        Herminia Pavon MD        CC: No Recipients

## 2018-02-09 NOTE — PROGRESS NOTES
Please refer to the [facility] ultrasound report for additional information regarding the evaluation of [place] in the [facility]  at [facility]

## 2018-02-10 LAB — BACTERIA UR CULT: NORMAL

## 2018-02-12 ENCOUNTER — TELEPHONE (OUTPATIENT)
Dept: OBGYN CLINIC | Facility: MEDICAL CENTER | Age: 33
End: 2018-02-12

## 2018-02-12 NOTE — TELEPHONE ENCOUNTER
----- Message from Sturgis Nexgence sent at 2/11/2018 10:09 AM EST -----  Please call patient regarding her NORMAL result  GBS UTI test of cure is normal  Thanks! Called and spoke with pt;  Notified her of normal result for UTI test of cure  Pt verbalized understanding

## 2018-03-06 ENCOUNTER — ROUTINE PRENATAL (OUTPATIENT)
Dept: OBGYN CLINIC | Facility: MEDICAL CENTER | Age: 33
End: 2018-03-06

## 2018-03-06 VITALS — WEIGHT: 180.2 LBS | DIASTOLIC BLOOD PRESSURE: 78 MMHG | SYSTOLIC BLOOD PRESSURE: 122 MMHG | BODY MASS INDEX: 35.19 KG/M2

## 2018-03-06 DIAGNOSIS — Z34.82 PRENATAL CARE, SUBSEQUENT PREGNANCY, SECOND TRIMESTER: Primary | ICD-10-CM

## 2018-03-06 DIAGNOSIS — Z34.92 SECOND TRIMESTER PREGNANCY: ICD-10-CM

## 2018-03-06 PROCEDURE — PNV: Performed by: OBSTETRICS & GYNECOLOGY

## 2018-03-22 ENCOUNTER — TELEPHONE (OUTPATIENT)
Dept: OBGYN CLINIC | Facility: CLINIC | Age: 33
End: 2018-03-22

## 2018-03-22 DIAGNOSIS — R73.09 ABNORMAL GLUCOSE: Primary | ICD-10-CM

## 2018-03-22 LAB
BASOPHILS # BLD AUTO: 36 CELLS/UL (ref 0–200)
BASOPHILS NFR BLD AUTO: 0.3 %
EOSINOPHIL # BLD AUTO: 252 CELLS/UL (ref 15–500)
EOSINOPHIL NFR BLD AUTO: 2.1 %
ERYTHROCYTE [DISTWIDTH] IN BLOOD BY AUTOMATED COUNT: 12.7 % (ref 11–15)
GLUCOSE 1H P 50 G GLC PO SERPL-MCNC: 145 MG/DL
HCT VFR BLD AUTO: 37.3 % (ref 35–45)
HGB BLD-MCNC: 12.2 G/DL (ref 11.7–15.5)
LYMPHOCYTES # BLD AUTO: 1536 CELLS/UL (ref 850–3900)
LYMPHOCYTES NFR BLD AUTO: 12.8 %
MCH RBC QN AUTO: 29.7 PG (ref 27–33)
MCHC RBC AUTO-ENTMCNC: 32.7 G/DL (ref 32–36)
MCV RBC AUTO: 90.8 FL (ref 80–100)
MONOCYTES # BLD AUTO: 576 CELLS/UL (ref 200–950)
MONOCYTES NFR BLD AUTO: 4.8 %
NEUTROPHILS # BLD AUTO: 9600 CELLS/UL (ref 1500–7800)
NEUTROPHILS NFR BLD AUTO: 80 %
PLATELET # BLD AUTO: 231 THOUSAND/UL (ref 140–400)
PMV BLD REES-ECKER: 9.7 FL (ref 7.5–12.5)
RBC # BLD AUTO: 4.11 MILLION/UL (ref 3.8–5.1)
RPR SER QL: NORMAL
WBC # BLD AUTO: 12 THOUSAND/UL (ref 3.8–10.8)

## 2018-03-22 NOTE — TELEPHONE ENCOUNTER
Spoke with pt - she is aware she failed the 1hr GTT and needs to go the 3hr GTT  Advised her it is a fasting test, best to go in the morning  Order in [address] [place] [address] Rd

## 2018-03-26 ENCOUNTER — TRANSCRIBE ORDERS (OUTPATIENT)
Dept: LAB | Facility: CLINIC | Age: 33
End: 2018-03-26

## 2018-03-26 ENCOUNTER — APPOINTMENT (OUTPATIENT)
Dept: LAB | Facility: CLINIC | Age: 33
End: 2018-03-26
Payer: COMMERCIAL

## 2018-03-26 ENCOUNTER — TELEPHONE (OUTPATIENT)
Dept: OBGYN CLINIC | Facility: CLINIC | Age: 33
End: 2018-03-26

## 2018-03-26 DIAGNOSIS — R73.09 ABNORMAL GLUCOSE: ICD-10-CM

## 2018-03-26 DIAGNOSIS — R73.09 ABNORMAL GLUCOSE: Primary | ICD-10-CM

## 2018-03-26 PROBLEM — O24.419 GESTATIONAL DIABETES MELLITUS: Status: ACTIVE | Noted: 2018-03-26

## 2018-03-26 LAB
GLUCOSE 1H P 100 G GLC PO SERPL-MCNC: 189 MG/DL (ref 65–179)
GLUCOSE 2H P 100 G GLC PO SERPL-MCNC: 181 MG/DL (ref 65–154)
GLUCOSE 3H P 100 G GLC PO SERPL-MCNC: 132 MG/DL (ref 65–139)
GLUCOSE P FAST SERPL-MCNC: 88 MG/DL (ref 65–99)
GLUCOSE SERPL-MCNC: 93 MG/DL (ref 65–140)

## 2018-03-26 PROCEDURE — 82948 REAGENT STRIP/BLOOD GLUCOSE: CPT

## 2018-03-26 PROCEDURE — 82952 GTT-ADDED SAMPLES: CPT

## 2018-03-26 PROCEDURE — 82951 GLUCOSE TOLERANCE TEST (GTT): CPT

## 2018-03-26 PROCEDURE — 36415 COLL VENOUS BLD VENIPUNCTURE: CPT

## 2018-03-26 NOTE — TELEPHONE ENCOUNTER
Patient is aware she did not pass her 3hr GTT  Advised pt that a counselor form [facility] will be calling her in about 2 days to set up an appointment       Gest: [id number]    DEUCE: 06/22/2018

## 2018-03-26 NOTE — TELEPHONE ENCOUNTER
Spoke with patient about her of normal 3 hour GTT   she had only received 2 results in the my chart system   I explained the full for results that we got about the test results and that she was diagnosed as having gestational diabetes   she will start [facility]

## 2018-03-29 ENCOUNTER — ROUTINE PRENATAL (OUTPATIENT)
Dept: OBGYN CLINIC | Facility: MEDICAL CENTER | Age: 33
End: 2018-03-29
Payer: COMMERCIAL

## 2018-03-29 VITALS — BODY MASS INDEX: 36.13 KG/M2 | SYSTOLIC BLOOD PRESSURE: 118 MMHG | DIASTOLIC BLOOD PRESSURE: 70 MMHG | WEIGHT: 185 LBS

## 2018-03-29 DIAGNOSIS — O24.410 DIET CONTROLLED GESTATIONAL DIABETES MELLITUS (GDM) IN THIRD TRIMESTER: ICD-10-CM

## 2018-03-29 DIAGNOSIS — Z23 NEED FOR DIPHTHERIA-TETANUS-PERTUSSIS (TDAP) VACCINE, ADULT/ADOLESCENT: ICD-10-CM

## 2018-03-29 DIAGNOSIS — Z3A.27 27 WEEKS GESTATION OF PREGNANCY: ICD-10-CM

## 2018-03-29 DIAGNOSIS — Z34.93 ENCOUNTER FOR PREGNANCY RELATED EXAMINATION IN THIRD TRIMESTER: Primary | ICD-10-CM

## 2018-03-29 PROBLEM — Z34.82 ENCOUNTER FOR SUPERVISION OF NORMAL PREGNANCY IN MULTIGRAVIDA IN SECOND TRIMESTER: Status: RESOLVED | Noted: 2018-02-08 | Resolved: 2018-03-29

## 2018-03-29 PROCEDURE — PNV: Performed by: NURSE PRACTITIONER

## 2018-03-29 PROCEDURE — 90715 TDAP VACCINE 7 YRS/> IM: CPT

## 2018-03-29 PROCEDURE — 90471 IMMUNIZATION ADMIN: CPT

## 2018-03-29 NOTE — ASSESSMENT & PLAN NOTE
Denies OB complaints  Good fetal movement  Denies contractions, cramping, leakage of fluid or vaginal bleeding  Tdap administered today  The patient has previously declined flu vaccine due to upper respiratory infection  - sx are improving, she will consider vaccination at next visit  Baby and Me considerations reinforced  Currently planning to bottle feed due to prior supply issues; reassurance provided and encouraged open mind  32 week growth scan is scheduled  Reviewed  labor precautions and FKCs

## 2018-03-29 NOTE — PROGRESS NOTES
Problem List Items Addressed This Visit     Gestational diabetes mellitus     2/4 abn values on 3hr gtt  The patient has been referred to [facility]  Encounter for pregnancy related examination in third trimester - Primary     Denies OB complaints  Good fetal movement  Denies contractions, cramping, leakage of fluid or vaginal bleeding  Tdap administered today  The patient has previously declined flu vaccine due to upper respiratory infection  - sx are improving, she will consider vaccination at next visit  Baby and Me considerations reinforced  Currently planning to bottle feed due to prior supply issues; reassurance provided and encouraged open mind  32 week growth scan is scheduled  Reviewed  labor precautions and FKCs              Relevant Orders    TDAP Vaccine greater than or equal to 8yo (Completed)      Other Visit Diagnoses     Need for diphtheria-tetanus-pertussis (Tdap) vaccine, adult/adolescent        Relevant Orders    TDAP Vaccine greater than or equal to 8yo (Completed)    27 weeks gestation of pregnancy

## 2018-04-05 ENCOUNTER — OFFICE VISIT (OUTPATIENT)
Dept: PERINATAL CARE | Facility: CLINIC | Age: 33
End: 2018-04-05

## 2018-04-05 ENCOUNTER — OFFICE VISIT (OUTPATIENT)
Dept: PERINATAL CARE | Facility: CLINIC | Age: 33
End: 2018-04-05
Payer: COMMERCIAL

## 2018-04-05 VITALS
BODY MASS INDEX: 36.32 KG/M2 | WEIGHT: 185 LBS | DIASTOLIC BLOOD PRESSURE: 67 MMHG | HEIGHT: 60 IN | HEART RATE: 117 BPM | SYSTOLIC BLOOD PRESSURE: 139 MMHG

## 2018-04-05 VITALS
SYSTOLIC BLOOD PRESSURE: 139 MMHG | HEIGHT: 60 IN | HEART RATE: 117 BPM | WEIGHT: 185 LBS | DIASTOLIC BLOOD PRESSURE: 67 MMHG | BODY MASS INDEX: 36.32 KG/M2

## 2018-04-05 DIAGNOSIS — O99.213 OBESITY AFFECTING PREGNANCY IN THIRD TRIMESTER: ICD-10-CM

## 2018-04-05 DIAGNOSIS — O24.410 DIET CONTROLLED GESTATIONAL DIABETES MELLITUS (GDM) IN THIRD TRIMESTER: Primary | ICD-10-CM

## 2018-04-05 DIAGNOSIS — Z3A.28 28 WEEKS GESTATION OF PREGNANCY: ICD-10-CM

## 2018-04-05 DIAGNOSIS — R73.09 ABNORMAL GLUCOSE: ICD-10-CM

## 2018-04-05 PROCEDURE — 99214 OFFICE O/P EST MOD 30 MIN: CPT | Performed by: NURSE PRACTITIONER

## 2018-04-05 PROCEDURE — G0108 DIAB MANAGE TRN  PER INDIV: HCPCS

## 2018-04-05 RX ORDER — FLASH GLUCOSE SENSOR
1 KIT MISCELLANEOUS EVERY 8 HOURS
Qty: 1 DEVICE | Refills: 0 | Status: SHIPPED | OUTPATIENT
Start: 2018-04-05 | End: 2018-04-06 | Stop reason: SDUPTHER

## 2018-04-05 RX ORDER — FLASH GLUCOSE SENSOR
3 KIT MISCELLANEOUS SEE ADMIN INSTRUCTIONS
Qty: 3 EACH | Refills: 3 | Status: SHIPPED | OUTPATIENT
Start: 2018-04-05 | End: 2018-04-06 | Stop reason: SDUPTHER

## 2018-04-05 RX ORDER — LANCETS
EACH MISCELLANEOUS
Qty: 102 EACH | Refills: 3 | Status: SHIPPED | OUTPATIENT
Start: 2018-04-05 | End: 2018-07-25

## 2018-04-05 RX ORDER — BLOOD SUGAR DIAGNOSTIC
STRIP MISCELLANEOUS
Qty: 150 EACH | Refills: 3 | Status: SHIPPED | OUTPATIENT
Start: 2018-04-05 | End: 2018-06-13 | Stop reason: HOSPADM

## 2018-04-05 NOTE — PROGRESS NOTES
Assessment/Plan:      Diagnoses and all orders for this visit:    Diet controlled gestational diabetes mellitus (GDM) in third trimester  -     Continuous Blood Gluc Sensor ([address] [place] [address]; [facility] each by Does not apply route see administration instructions Switch sensor every 10 days  -     Continuous Blood Gluc  (FREESTYLE SHAHIDA READER) ELFEGO; 1 Device by Does not apply route every 8 (eight) hours Scan reader every 8 hours  -     HEMOGLOBIN A1C W/ EAG ESTIMATION; Future  -     Comprehensive metabolic panel; Future  -     TSH, 3rd generation with T4 reflex; Future  -     ACCU-CHEK GUIDE test strip; Test 4 times a day and as instructed  -     ACCU-CHEK FASTCLIX LANCETS MISC; Use 4 a day and as directed  28 weeks gestation of pregnancy  -     Continuous Blood Gluc Sensor ([address] [place] [address]) [facility]; 3 each by Does not apply route see administration instructions Switch sensor every 10 days  -     Continuous Blood Gluc  (FREESTYLE SHAHIDA READER) ELFEGO; 1 Device by Does not apply route every 8 (eight) hours Scan reader every 8 hours  -     HEMOGLOBIN A1C W/ EAG ESTIMATION; Future  -     Comprehensive metabolic panel; Future  -     TSH, 3rd generation with T4 reflex; Future  -     ACCU-CHEK GUIDE test strip; Test 4 times a day and as instructed  -     ACCU-CHEK FASTCLIX LANCETS MISC; Use 4 a day and as directed  Abnormal glucose  -     Ambulatory Referral to Maternal Fetal Medicine  -     Continuous Blood Gluc Sensor ([address] [place] [address]) [facility] [place] [place]; 3 each by Does not apply route see administration instructions Switch sensor every 10 days  -     Continuous Blood Gluc  (FREESTYLE SHAHIDA READER) ELFEGO; 1 Device by Does not apply route every 8 (eight) hours Scan reader every 8 hours  -     HEMOGLOBIN A1C W/ EAG ESTIMATION; Future  -     Comprehensive metabolic panel; Future  -     TSH, 3rd generation with T4 reflex; Future  -     ACCU-CHEK GUIDE test strip;  Test 4 times a day and as instructed  -     ACCU-CHEK FASTCLIX LANCETS MISC; Use 4 a day and as directed  Obesity affecting pregnancy in third trimester  -     Continuous Blood Gluc Sensor ([address]) MISC; 3 each by Does not apply route see administration instructions Switch sensor every 10 days  -     Continuous Blood Gluc  (FREESTYLE KALEE READER) ELFEGO; 1 Device by Does not apply route every 8 (eight) hours Scan reader every 8 hours  -     HEMOGLOBIN A1C W/ EAG ESTIMATION; Future  -     Comprehensive metabolic panel; Future  -     TSH, 3rd generation with T4 reflex; Future  -     ACCU-CHEK GUIDE test strip; Test 4 times a day and as instructed  -     ACCU-CHEK FASTCLIX LANCETS MISC; Use 4 a day and as directed  1  Start GDM diet recommended with 3 meals and 3 snacks including protein  2  Given needle phobia, please have your  check am blood sugar and 2 hours post dinner blood sugar  3  Sent a script for Sis Bae personal CGM given phobia and refusal to check blood sugars 4 times a day  4  Keep blood sugar log and report 1 to 2 times a week  5  Fetal growth US every 4 weeks  6  Continue follow-up with your OB and MFM who may request further fetal surveillance starting at 32 weeks gestation  7  Discussed Metformin, Glyburide and insulin  Metformin crosses placenta and insulin is safest    8  Fasting blood sugar goal 60 to 90, 1 hour  or less and 2 hour  or less  9  Discussed risks factors and pathophysiology of diabetes in pregnancy  10  Stay active if no restriction from your Ob    11  Schedule Freestyle Kalee continuous glucose monitoring education  12  Follow-up in 2 weeks with dietician  13  Please contact office with concerns  -CGM is off label use in pregnancy and patient informed self monitoring blood glucose is preferred method of assessing blood sugars in pregnancy     -Reviewed importance of maintaining tight control of blood sugars to decrease risks to baby and mom  -Given needle phobia, if insulin is required, consider V-Go system and educate  on use of device  Subjective:     Patient ID: Lorne Landaverde is a 34 y o  female  , DEUCE 18, 28 6/7 weeks gestation  She is here for referral for gestational diabetes  No prior history of GDM  Has a 3 yo daughter, delivered vaginally around 39 weeks gestation, no complications, weighed 7 lbs 9 oz  Denies increase thirst, hunger or urination  Reported nausea in beginning and no problems currently  No vaginal bleeding and baby is active  History of migraines and bronchitis  Reports a fear of needles and blood   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 at least twice a day  Verl Domenicoir refuses to self check blood sugars  Review of Systems   Constitutional: Negative  Respiratory: Negative  Cardiovascular: Negative  Gastrointestinal: Negative for nausea and vomiting  Endocrine: Negative  Genitourinary: Negative for dysuria and vaginal pain  Neurological: Positive for headaches  Psychiatric/Behavioral: The patient is nervous/anxious  Objective:  Vitals:    18 0824   BP: 139/67   Pulse: (!) 117   wt  185 lbs, ht  5'  3/20/18 1 H   3/26/18 3 H , 181 and 132     Physical Exam   Constitutional: She is oriented to person, place, and time  She appears well-developed and well-nourished  HENT:   Head: Normocephalic  Neck: Normal range of motion  Neck supple  Cardiovascular: Normal rate, regular rhythm and normal heart sounds  Pulmonary/Chest: Effort normal and breath sounds normal    Musculoskeletal: Normal range of motion  Neurological: She is alert and oriented to person, place, and time  Skin: Skin is warm and dry  Psychiatric: She has a normal mood and affect   Her behavior is normal

## 2018-04-05 NOTE — PATIENT INSTRUCTIONS
1  Start GDM diet recommended with 3 meals and 3 snacks including protein  2  Given needle phobia, please have your  check am blood sugar and 2 hours post dinner blood sugar  3  Sent a script for Sis Bae personal CGM given phobia and refusal to check blood sugars 4 times a day  4  Keep blood sugar log and report 1 to 2 times a week  5  Fetal growth US every 4 weeks  6  Continue follow-up with your OB and MFM who may request further fetal surveillance starting at 32 weeks gestation  7  Discussed Metformin, Glyburide and insulin  Metformin crosses placenta and insulin is safest    8  Fasting blood sugar goal 60 to 90, 1 hour  or less and 2 hour  or less  9  Discussed risks factors and pathophysiology of diabetes in pregnancy  10  Stay active if no restriction from your Ob    11  Schedule Freestyle Kalee continuous glucose monitoring education  12  Follow-up in 2 weeks with dietician  13  Please contact office with concerns

## 2018-04-05 NOTE — PROGRESS NOTES
DATE:  18  RE: Prakash Willis    : 1985    EDUCE: 18    EGA: 28w6d    Dear Dr Filiberto Angel,    Thank you for referring your patient to the Diabetes and Pregnancy Program at [address]  The patient was also seen by Sisi nunez prior to Class 2 appointment  The patient attended Class 2 received the following education:    Weight gain during in pregnancy  Based on the patients height of 5' (1 524 m) inches, pre-pregnancy weight of 75 3 kg (166 lb) pounds BMI( 32 4 ), we would recommend a total weight gain of 11-20 pounds for the pregnancy   The patients current weight is 83 9 kg (185 lb)pounds, and her weight gain to date is 19 pounds  Based on this, we recommend minimal weight gain for the remainder of the pregnancy   Medical Nutrition Therapy for diabetes and pregnancy  The patient was provided with a 1900 calorie gestational diabetes meal plan and instructed on the following:  o Individualized meal plan    o Use of food diary to maintain a meal plan    o Importance of protein as it relates to blood glucose control   Review of blood glucose log  Reinforcement of blood glucose goals and reporting guidelines   Ultrasounds every four weeks in the [facility] to evaluate fetal growth   Exercise Guidelines:   o Walking up to thirty minutes daily can reduce blood glucose levels  o Monitor for greater than four contractions per hour     o The patient has been instructed not to begin physical activity if she has been instructed not to exercise by your office   Sick day guidelines and hypoglycemia with treatment   Post-partum guidelines:  o Completion of a 75 gram glucose tolerance test at 6 weeks post-partum to check for type 2 diabetes  o 20% weight loss and 30 minutes of exercise 5 times per week reduces the risk of type 2 diabetes   Breastfeeding guidelines     Report blood glucose levels to [facility] weekly or as directed  o Phone: [phone]  If no response in 24 hours, call [phone]    o Fax: [phone]  o Email: [email or web address]    Please contact the Diabetes and Pregnancy Program at [phone] if you have questions  Time spent with patient [phone]; time spent face to face counseling greater than 50% of the appointment      Sincerely,     Ramon Stone RD,LDN,CDE  Diabetes Educator  [facility]

## 2018-04-06 DIAGNOSIS — O99.213 OBESITY AFFECTING PREGNANCY IN THIRD TRIMESTER: ICD-10-CM

## 2018-04-06 DIAGNOSIS — O24.410 DIET CONTROLLED GESTATIONAL DIABETES MELLITUS (GDM) IN THIRD TRIMESTER: ICD-10-CM

## 2018-04-06 DIAGNOSIS — Z3A.28 28 WEEKS GESTATION OF PREGNANCY: ICD-10-CM

## 2018-04-06 DIAGNOSIS — R73.09 ABNORMAL GLUCOSE: ICD-10-CM

## 2018-04-06 RX ORDER — FLASH GLUCOSE SENSOR
1 KIT MISCELLANEOUS EVERY 8 HOURS
Qty: 1 DEVICE | Refills: 0 | Status: SHIPPED | OUTPATIENT
Start: 2018-04-06 | End: 2018-06-08 | Stop reason: ALTCHOICE

## 2018-04-06 RX ORDER — FLASH GLUCOSE SENSOR
3 KIT MISCELLANEOUS SEE ADMIN INSTRUCTIONS
Qty: 3 EACH | Refills: 0 | Status: SHIPPED | OUTPATIENT
Start: 2018-04-06 | End: 2018-05-01 | Stop reason: SDUPTHER

## 2018-04-09 ENCOUNTER — TELEPHONE (OUTPATIENT)
Dept: PERINATAL CARE | Facility: CLINIC | Age: 33
End: 2018-04-09

## 2018-04-09 ENCOUNTER — ROUTINE PRENATAL (OUTPATIENT)
Dept: OBGYN CLINIC | Facility: MEDICAL CENTER | Age: 33
End: 2018-04-09

## 2018-04-09 ENCOUNTER — APPOINTMENT (OUTPATIENT)
Dept: LAB | Facility: MEDICAL CENTER | Age: 33
End: 2018-04-09
Payer: COMMERCIAL

## 2018-04-09 VITALS — BODY MASS INDEX: 35.86 KG/M2 | SYSTOLIC BLOOD PRESSURE: 110 MMHG | WEIGHT: 183.6 LBS | DIASTOLIC BLOOD PRESSURE: 80 MMHG

## 2018-04-09 DIAGNOSIS — O24.410 DIET CONTROLLED GESTATIONAL DIABETES MELLITUS (GDM) IN THIRD TRIMESTER: Primary | ICD-10-CM

## 2018-04-09 DIAGNOSIS — O99.213 OBESITY AFFECTING PREGNANCY IN THIRD TRIMESTER: ICD-10-CM

## 2018-04-09 DIAGNOSIS — Z3A.28 28 WEEKS GESTATION OF PREGNANCY: ICD-10-CM

## 2018-04-09 DIAGNOSIS — O24.410 DIET CONTROLLED GESTATIONAL DIABETES MELLITUS (GDM) IN THIRD TRIMESTER: ICD-10-CM

## 2018-04-09 DIAGNOSIS — R73.09 ABNORMAL GLUCOSE: ICD-10-CM

## 2018-04-09 PROBLEM — Z3A.29 29 WEEKS GESTATION OF PREGNANCY: Status: ACTIVE | Noted: 2018-04-05

## 2018-04-09 LAB
EST. AVERAGE GLUCOSE BLD GHB EST-MCNC: 100 MG/DL
HBA1C MFR BLD: 5.1 % (ref 4.2–6.3)

## 2018-04-09 PROCEDURE — PNV: Performed by: OBSTETRICS & GYNECOLOGY

## 2018-04-09 PROCEDURE — 83036 HEMOGLOBIN GLYCOSYLATED A1C: CPT

## 2018-04-09 PROCEDURE — 36415 COLL VENOUS BLD VENIPUNCTURE: CPT

## 2018-04-09 PROCEDURE — 80053 COMPREHEN METABOLIC PANEL: CPT

## 2018-04-09 PROCEDURE — 84443 ASSAY THYROID STIM HORMONE: CPT

## 2018-04-09 NOTE — ASSESSMENT & PLAN NOTE
Patient feeling very overwhelmed with this diagnosis and the requirements  Has severe needle phobia,  is checking fasting and 2h PP dinner blood sugars daily  Fasting bs have been slightly elevated, but patient wishes to give the dietary changes a few more days before insisting she starts medication  Has been compliant with diet and walking after dinner  She is also wearing a continuous BG monitor - although this is not studied in pregnancy  We discussed at length that her insulin resistance and need for medication is going to increase as the pregnancy goes on  We discussed potential need for QHS insulin for her elevated fastings - which her  would be able to administer  She reports that he will be unlikely to be able to attend class with her due to his schedule, but she would be comfortable with this  She was tearful today and very stressed about all of this, as she did not have GDM her first pregnancy  We reviewed that she only has to check sugars until she delivers, at that this is not permanent (although she is at lifetime risk)  She is very aware of the risks to her baby if this goes untreated  I encouraged her to touch base with [facility] later this week to report her sugars again to see how her fasting blood sugars are doing

## 2018-04-09 NOTE — PROGRESS NOTES
Problem List Items Addressed This Visit        Endocrine    Gestational diabetes mellitus - Primary     Patient feeling very overwhelmed with this diagnosis and the requirements  Has severe needle phobia,  is checking fasting and 2h PP dinner blood sugars daily  Fasting bs have been slightly elevated, but patient wishes to give the dietary changes a few more days before insisting she starts medication  Has been compliant with diet and walking after dinner  She is also wearing a continuous BG monitor - although this is not studied in pregnancy  We discussed at length that her insulin resistance and need for medication is going to increase as the pregnancy goes on  We discussed potential need for QHS insulin for her elevated fastings - which her  would be able to administer  She reports that he will be unlikely to be able to attend class with her due to his schedule, but she would be comfortable with this  She was tearful today and very stressed about all of this, as she did not have GDM her first pregnancy  We reviewed that she only has to check sugars until she delivers, at that this is not permanent (although she is at lifetime risk)  She is very aware of the risks to her baby if this goes untreated  I encouraged her to touch base with [facility] later this week to report her sugars again to see how her fasting blood sugars are doing

## 2018-04-09 NOTE — TELEPHONE ENCOUNTER
Date:  18  RE: Jesús Corcoran    : 1985  DEUCE: Estimated Date of Delivery: 18  EGA: Carlos Holcomb  ObGyn: LARRY      Date Fasting Post-  breakfast Post-  lunch Post-  dinner Before bedtime Carbs Comments   4-4  89       4-5 92 NR       4-6 98 NR       4-7 100 NR       4-8 90                       Current regimen:  1900 calorie gestational diabetes diet with 3 meals/snacks  Self blood glucose monitoring fasting and 2 hours after meals with Accu-chek Guide meter recommended, patient refuses to test herself so  is doing it twice a day  Patient is also using Free Style Kalee CGM device to track glucose which isn't approved for treatment during pregnancy  Plan:  Continue 1900 calorie gestational diabetes diet with 3 meals/snacks with only protein for bedtime snack  Schedule appointment, this week, to add medication in attempts to get fasting blood sugars consistently into target range  Date due to report next:  Report blood sugars at appointment  Byron Johnson contacted office regarding not being able to schedule an appointment to start on insulin  She reports a lot of stress, following recommended diet,  testing blood sugar twice a day and using personal CGM secondary to refusing to monitor blood sugars 4 times a day as recommended  Linda Peterson was made aware insulin requirements will increase with gestation  Given FBS >90 3 out of 4 readings, basal insulin is recommended  She has a fear of needles and is currently refusing to start basal insulin  Message sent over to Dr Josephine Sandoval regarding refusal to start insulin       Anna Russo RN  Diabetes Educator   [facility]regnancy [facility]

## 2018-04-10 LAB
ALBUMIN SERPL BCP-MCNC: 2.9 G/DL (ref 3.5–5)
ALP SERPL-CCNC: 113 U/L (ref 46–116)
ALT SERPL W P-5'-P-CCNC: 12 U/L (ref 12–78)
ANION GAP SERPL CALCULATED.3IONS-SCNC: 8 MMOL/L (ref 4–13)
AST SERPL W P-5'-P-CCNC: 11 U/L (ref 5–45)
BILIRUB SERPL-MCNC: 0.5 MG/DL (ref 0.2–1)
BUN SERPL-MCNC: 7 MG/DL (ref 5–25)
CALCIUM SERPL-MCNC: 8.8 MG/DL
CHLORIDE SERPL-SCNC: 105 MMOL/L (ref 100–108)
CO2 SERPL-SCNC: 23 MMOL/L (ref 21–32)
CREAT SERPL-MCNC: 0.49 MG/DL (ref 0.6–1.3)
GFR SERPL CREATININE-BSD FRML MDRD: 128 ML/MIN/1.73SQ M
GLUCOSE SERPL-MCNC: 76 MG/DL (ref 65–140)
POTASSIUM SERPL-SCNC: 3.7 MMOL/L (ref 3.5–5.3)
PROT SERPL-MCNC: 7.1 G/DL (ref 6.4–8.2)
SODIUM SERPL-SCNC: 136 MMOL/L (ref 136–145)
TSH SERPL DL<=0.05 MIU/L-ACNC: 0.86 UIU/ML (ref 0.36–3.74)

## 2018-04-13 ENCOUNTER — TRANSCRIBE ORDERS (OUTPATIENT)
Dept: ADMINISTRATIVE | Facility: HOSPITAL | Age: 33
End: 2018-04-13

## 2018-04-13 ENCOUNTER — TELEPHONE (OUTPATIENT)
Dept: PERINATAL CARE | Facility: CLINIC | Age: 33
End: 2018-04-13

## 2018-04-13 DIAGNOSIS — R73.09 OTHER ABNORMAL GLUCOSE: ICD-10-CM

## 2018-04-13 DIAGNOSIS — O99.213 OBESITY COMPLICATING PREGNANCY IN THIRD TRIMESTER: ICD-10-CM

## 2018-04-13 DIAGNOSIS — Z3A.28 28 WEEKS GESTATION OF PREGNANCY: Primary | ICD-10-CM

## 2018-04-13 NOTE — TELEPHONE ENCOUNTER
Date:  18  RE: April Palomino    : 1985  DEUCE: Estimated Date of Delivery: 18  EGA: 30w0d  ObGyn: LARRY    Diet controlled gestational diabetes  Date Fasting Post-  breakfast Post-  lunch Post-  dinner Before bedtime Carbs Comments   18 90 98 77 113      4/10/18 98 106 74 105      18 96 104  101      18 101 102 105 111                              Current regimen:  1900 calorie gestational diabetes diet with 3 meals/snacks  Self blood glucose monitoring fasting and 2 hours after meals with Accu-chek Guide meter recommended  Patient refuses to test herself so  is doing it twice a day  Patient is also using Free Style Kalee CGM device to track glucose which isn't approved for treatment during pregnancy  Plan:  Continue 1900 calorie gestational diabetes diet with 3 meals/snacks with only protein for bedtime snack  Provided examples of protein only snacks  Has agreed to begin insulin now that spoke with her OB physician  But on vacation next week & will not be available to come in for insulin instruction till Friday, 18  Date due to report next:  Report blood sugars Thursday, 18 while on vacation  Schedule insulin Instruction for Friday, 18      Mei Bashir  Diabetes Educator   [facility]regnancy [facility] This note was dictated using voice recognition software and may contain errors.    At her request I spoke with her plastic surgeon today, Dr. Bala Geronimo at 1:30 PM.  Dr. Geronimo called me on the telephone and I spoke with him when he called.    Dr. Geronimo informed me that he had performed surgery, I believe approximately December 23, 2016.  He informed me she received 2 or 3 doses of the antibiotic Keflex.  He performed  breast surgery.    I told Dr. Geronimo since meeting  on Thursday, January 5, 2017 denied spoken with her on January 6 in January 7 and January 9.  I told him I had tried calling her but did not stay reachers speak with her on Sunday, January 8.    I told Dr. Geronimo that I cannot state with certainty why she is developed her urticaria and angioedema.  I told him I am concerned that she may have experienced a respiratory tract infectious illness, perhaps viral and this may be a provoking factor as it relates to the inflammation in her skin.    I told Dr. Geronimoon I will continue to speak with her on a daily basis monitoring her status and making recommendations regarding treatment.

## 2018-04-20 ENCOUNTER — TELEPHONE (OUTPATIENT)
Dept: PERINATAL CARE | Facility: CLINIC | Age: 33
End: 2018-04-20

## 2018-04-20 NOTE — TELEPHONE ENCOUNTER
Date:  18  RE: Lucy Ayala    : 1985  DEUCE: Estimated Date of Delivery: 18  EGA: 31w0d  ObGyn: BINDUGA    Diet controlled gestational diabetes  Date Fasting Post-  breakfast Post-  lunch Post-  dinner Before bedtime Carbs Comments   18 93 97 103 94      18 85 67 106 110      18 91 73 88 103      18 92 105 105 104                              Current regimen:  1900 calorie gestational diabetes diet with 3 meals/snacks  Self blood glucose monitoring fasting and 2 hours after meals with Accu-chek Guide meter recommended  Patient refuses to test herself so  is doing it twice a day  Patient is also using Free Style Kalee CGM device to track glucose which isn't approved for treatment during pregnancy  Plan:  Continue 1900 calorie gestational diabetes diet with 3 meals/snacks with only protein for bedtime snack  Has agreed to begin insulin now that spoke with her OB physician  Insulin instruction scheduled for Friday, 18  Date due to report next:  Report BG at Friday, 18 insulin Instruction appointment  Jonna Tucker  Diabetes Educator   Diabetes [facility] Pregnancy Program    The diabetic educator note was reviewed by me  I agree with her note    Aravind Topete MD

## 2018-04-25 ENCOUNTER — ROUTINE PRENATAL (OUTPATIENT)
Dept: OBGYN CLINIC | Facility: MEDICAL CENTER | Age: 33
End: 2018-04-25
Payer: COMMERCIAL

## 2018-04-25 VITALS — WEIGHT: 181.8 LBS | BODY MASS INDEX: 35.51 KG/M2 | SYSTOLIC BLOOD PRESSURE: 122 MMHG | DIASTOLIC BLOOD PRESSURE: 78 MMHG

## 2018-04-25 DIAGNOSIS — Z34.93 ENCOUNTER FOR PREGNANCY RELATED EXAMINATION IN THIRD TRIMESTER: ICD-10-CM

## 2018-04-25 DIAGNOSIS — O09.93 SUPERVISION OF HIGH RISK PREGNANCY IN THIRD TRIMESTER: Primary | ICD-10-CM

## 2018-04-25 DIAGNOSIS — O24.410 DIET CONTROLLED GESTATIONAL DIABETES MELLITUS (GDM) IN THIRD TRIMESTER: ICD-10-CM

## 2018-04-25 DIAGNOSIS — Z3A.31 31 WEEKS GESTATION OF PREGNANCY: ICD-10-CM

## 2018-04-25 DIAGNOSIS — Z23 INFLUENZA VACCINE ADMINISTERED: ICD-10-CM

## 2018-04-25 PROCEDURE — 90686 IIV4 VACC NO PRSV 0.5 ML IM: CPT | Performed by: NURSE PRACTITIONER

## 2018-04-25 PROCEDURE — PNV: Performed by: NURSE PRACTITIONER

## 2018-04-25 PROCEDURE — 90471 IMMUNIZATION ADMIN: CPT | Performed by: NURSE PRACTITIONER

## 2018-04-25 NOTE — ASSESSMENT & PLAN NOTE
Denies OB complaints  Good fetal movement  Denies contractions, cramping, leakage of fluid or vaginal bleeding  Flu vaccine administered today  S/p tdap  Baby and Me considerations reinforced  Reviewed  labor precautions and FKCs

## 2018-04-25 NOTE — ASSESSMENT & PLAN NOTE
Bgs well controlled on diet per pt  She is now using subQ continuous blood glucose sensor and is tolerating well  Uriah Rob reports severe needle phobia and this was affecting compliance with BG monitoring  F/u is scheduled with [facility] and growth scan is booked for next week

## 2018-04-25 NOTE — PROGRESS NOTES
Problem List Items Addressed This Visit     Gestational diabetes mellitus     Bgs well controlled on diet per pt  She is now using subQ continuous blood glucose sensor and is tolerating well  Dayron Mccullough reports severe needle phobia and this was affecting compliance with BG monitoring  F/u is scheduled with [facility] and growth scan is booked for next week  RESOLVED: Encounter for pregnancy related examination in third trimester    Relevant Orders    FLU VACCINE QUADRIVALENT GREATER THAN OR EQUAL TO 4YO PRESERVATIVE FREE IM (Completed)    31 weeks gestation of pregnancy    Supervision of high risk pregnancy in third trimester - Primary     Denies OB complaints  Good fetal movement  Denies contractions, cramping, leakage of fluid or vaginal bleeding  Flu vaccine administered today  S/p tdap  Baby and Me considerations reinforced  Reviewed  labor precautions and FKCs                Other Visit Diagnoses     Influenza vaccine administered        Relevant Orders    FLU VACCINE QUADRIVALENT GREATER THAN OR EQUAL TO 4YO PRESERVATIVE FREE IM (Completed)

## 2018-04-27 ENCOUNTER — TELEPHONE (OUTPATIENT)
Dept: PERINATAL CARE | Facility: CLINIC | Age: 33
End: 2018-04-27

## 2018-04-27 ENCOUNTER — OFFICE VISIT (OUTPATIENT)
Dept: PERINATAL CARE | Facility: CLINIC | Age: 33
End: 2018-04-27
Payer: COMMERCIAL

## 2018-04-27 VITALS
SYSTOLIC BLOOD PRESSURE: 105 MMHG | HEART RATE: 111 BPM | WEIGHT: 181.8 LBS | DIASTOLIC BLOOD PRESSURE: 76 MMHG | HEIGHT: 60 IN | BODY MASS INDEX: 35.69 KG/M2

## 2018-04-27 DIAGNOSIS — O99.213 OBESITY AFFECTING PREGNANCY IN THIRD TRIMESTER: ICD-10-CM

## 2018-04-27 DIAGNOSIS — Z3A.31 31 WEEKS GESTATION OF PREGNANCY: ICD-10-CM

## 2018-04-27 DIAGNOSIS — O24.414 INSULIN CONTROLLED GESTATIONAL DIABETES MELLITUS (GDM) IN THIRD TRIMESTER: Primary | ICD-10-CM

## 2018-04-27 PROCEDURE — G0108 DIAB MANAGE TRN  PER INDIV: HCPCS | Performed by: DIETITIAN, REGISTERED

## 2018-04-27 NOTE — TELEPHONE ENCOUNTER
Date:  18  RE: Indio Dominguez    : 1985  DEUCE: Estimated Date of Delivery: 18  EGA: 32w0d  ObGyn: LARRY    Insulin controlled gestational diabetes  Date Fasting Post-  breakfast Post-  lunch Post-  dinner Before bedtime Carbs Comments   18 96 108 111 117      18 82 88 107 116      18 93 103 55*/111 113      18 92 107 96 92      18 92 95 76 88      18 83 65 107 111      18 92 94 91 97      18 88 94        *--denotes hypoglycemia    Current regimen:  1900 calorie gestational diabetes diet with 3 meals/snacks  Self monitoring blood glucose fasting and 2 hours after meals with Accu-chek Guide meter recommended  Patient refuses to test herself; so  is doing it twice daily for FBS & 2 hr pp dinner  Patient is also using Free Style Kalee CGM device to track glucose which isn't approved for treatment during pregnancy  Plan:  Begin 16 units Levemir at HS  Prescriptions sent to her pharmacy  Advised to monitor 3 AM BG; but patient refuses wake  at 3 am   Advised patient to schedule NST appointments but became teary-eyed due to probably of missing more work time  Continue 1900 calorie gestational diabetes diet with 3 meals/snacks with only protein for bedtime snack       Date due to report next:  Monday, 18    Saman Marcial  Diabetes Educator   [facility] Pregnancy Program

## 2018-04-27 NOTE — PROGRESS NOTES
DATE: 18   RE: Daniel Ash   : 1985  DEUCE: Estimated Date of Delivery: 18  EGA:  32w0d    Dear Dr Cathy Rebollar: Your patient was seen in the office today for insulin teaching  Please refer to Diabetes and Pregnancy Progress Record for complete documentation of patients blood glucose levels  Height: 5' (1 524 m)   Weight: 82 5 kg (181 lb 12 8 oz)    The patient was instructed on the following:     Novolog Flex Touch Pen usage  Initiate 16 units Levemir at HS   to give her nightly injections   Insulin administration times, insulin action   Hypoglycemia signs, symptoms and treatment   Increase in maternal-fetal surveillance with insulin initiation  Patient became teary-eyed when told about completing non-stress testing twice weekly due to missing work days   Side effects of hyperglycemia in pregnancy including macrosomia,  hypoglycemia, polyhydramnios, pre-term labor and stillbirth   Continue to monitor blood glucoses via fingerstick fasting (goal 60 mg/dl to 90 mg/dl) and two hours post prandial (goal less than 120 mg/dl)  Patient has a severe needle phobia & uses FreeStyle Kalee continuous glucose monitoring system for BG results during day   takes FBS & 2 hr post dinner BG via fingerstick twice daily only  Advised patient to monitor  3 AM BG till next BG report; patient refused to do this   Follow up with our office on Monday, 18 for evaluation of blood glucose levels   Non-stress testing two times weekly and RJ testing beginning at 32 weeks gestation; advised patient to schedule appointments now   Ultrasounds every 4 weeks at the [address]  Next ultrasound scheduled Friday, 18   HbA1c every 6 to 8 weeks, CMP; were recently completed   Diet recall indicates patient is following the meal plan closely     Thank you for the opportunity to participate in the care of this patient    I can be reached at [phone] should you have any questions  Time spent with patient 9:20-10:15 AM; time spent face to face counseling greater than 50% of the appointment      Sincerely,     Roxane Weeks  Diabetes Educator  [facility]

## 2018-05-01 DIAGNOSIS — O99.213 OBESITY AFFECTING PREGNANCY IN THIRD TRIMESTER: ICD-10-CM

## 2018-05-01 DIAGNOSIS — R73.09 ABNORMAL GLUCOSE: ICD-10-CM

## 2018-05-01 DIAGNOSIS — O24.410 DIET CONTROLLED GESTATIONAL DIABETES MELLITUS (GDM) IN THIRD TRIMESTER: ICD-10-CM

## 2018-05-01 DIAGNOSIS — Z3A.28 28 WEEKS GESTATION OF PREGNANCY: ICD-10-CM

## 2018-05-02 RX ORDER — FLASH GLUCOSE SENSOR
KIT MISCELLANEOUS
Qty: 3 EACH | Refills: 3 | Status: SHIPPED | OUTPATIENT
Start: 2018-05-02 | End: 2018-06-08 | Stop reason: ALTCHOICE

## 2018-05-04 ENCOUNTER — ULTRASOUND (OUTPATIENT)
Dept: PERINATAL CARE | Facility: MEDICAL CENTER | Age: 33
End: 2018-05-04
Payer: COMMERCIAL

## 2018-05-04 VITALS
SYSTOLIC BLOOD PRESSURE: 102 MMHG | BODY MASS INDEX: 35.53 KG/M2 | HEIGHT: 60 IN | DIASTOLIC BLOOD PRESSURE: 62 MMHG | HEART RATE: 103 BPM | WEIGHT: 181 LBS

## 2018-05-04 DIAGNOSIS — Z3A.33 33 WEEKS GESTATION OF PREGNANCY: ICD-10-CM

## 2018-05-04 DIAGNOSIS — O24.414 INSULIN CONTROLLED GESTATIONAL DIABETES MELLITUS (GDM) IN THIRD TRIMESTER: Primary | ICD-10-CM

## 2018-05-04 DIAGNOSIS — O99.213 OBESITY AFFECTING PREGNANCY IN THIRD TRIMESTER: ICD-10-CM

## 2018-05-04 PROCEDURE — 76816 OB US FOLLOW-UP PER FETUS: CPT | Performed by: OBSTETRICS & GYNECOLOGY

## 2018-05-04 PROCEDURE — 99212 OFFICE O/P EST SF 10 MIN: CPT | Performed by: OBSTETRICS & GYNECOLOGY

## 2018-05-04 NOTE — PROGRESS NOTES
Normal fetal growth and fluid seen  Stomach bubble today not seen  No other signs suggestive of esophageal atresia or fistula  Stomach was seen at her 21 week ultrasound  Will review in four weeks  Started on insulin approximately one week ago  Needs to start twice weekly NST and weekly RJ  She will review her work schedule and my office will call her on Monday to schedule  She reports she can only come to [place] as the other sites are too far  She reports she will call in her blood sugars later today       Jose Barcenas MD

## 2018-05-08 ENCOUNTER — APPOINTMENT (OUTPATIENT)
Dept: PERINATAL CARE | Facility: MEDICAL CENTER | Age: 33
End: 2018-05-08
Payer: COMMERCIAL

## 2018-05-08 ENCOUNTER — ROUTINE PRENATAL (OUTPATIENT)
Dept: PERINATAL CARE | Facility: MEDICAL CENTER | Age: 33
End: 2018-05-08
Payer: COMMERCIAL

## 2018-05-08 VITALS
BODY MASS INDEX: 35.73 KG/M2 | HEIGHT: 60 IN | SYSTOLIC BLOOD PRESSURE: 111 MMHG | DIASTOLIC BLOOD PRESSURE: 77 MMHG | HEART RATE: 98 BPM | WEIGHT: 182 LBS

## 2018-05-08 DIAGNOSIS — O24.414 INSULIN CONTROLLED GESTATIONAL DIABETES MELLITUS (GDM) IN THIRD TRIMESTER: ICD-10-CM

## 2018-05-08 DIAGNOSIS — Z3A.33 33 WEEKS GESTATION OF PREGNANCY: ICD-10-CM

## 2018-05-08 PROCEDURE — 59025 FETAL NON-STRESS TEST: CPT | Performed by: OBSTETRICS & GYNECOLOGY

## 2018-05-08 PROCEDURE — 76815 OB US LIMITED FETUS(S): CPT | Performed by: OBSTETRICS & GYNECOLOGY

## 2018-05-08 NOTE — PROGRESS NOTES
NST procedure and expected outcome explained to patient  Daily fetal kick count discussed with handout given  Patient verbalized understanding of all and was receptive      Wu Cowart RN

## 2018-05-10 ENCOUNTER — ROUTINE PRENATAL (OUTPATIENT)
Dept: OBGYN CLINIC | Facility: MEDICAL CENTER | Age: 33
End: 2018-05-10

## 2018-05-10 VITALS
DIASTOLIC BLOOD PRESSURE: 70 MMHG | SYSTOLIC BLOOD PRESSURE: 124 MMHG | BODY MASS INDEX: 35.97 KG/M2 | RESPIRATION RATE: 14 BRPM | WEIGHT: 183.2 LBS | HEIGHT: 60 IN

## 2018-05-10 DIAGNOSIS — O24.414 INSULIN CONTROLLED GESTATIONAL DIABETES MELLITUS (GDM) IN THIRD TRIMESTER: ICD-10-CM

## 2018-05-10 DIAGNOSIS — Z3A.33 33 WEEKS GESTATION OF PREGNANCY: ICD-10-CM

## 2018-05-10 DIAGNOSIS — O99.213 OBESITY AFFECTING PREGNANCY IN THIRD TRIMESTER: ICD-10-CM

## 2018-05-10 PROCEDURE — PNV: Performed by: OBSTETRICS & GYNECOLOGY

## 2018-05-10 NOTE — PROGRESS NOTES
A 25-year-old female para 1 at 35 and 6 7th weeks here for routine prenatal visit without complaint patient was recently diagnosed is an A2 GDM and started on insulin  Patient states her fasting blood sugars are in the 80s  Interestingly patient is using a new blood glucose monitoring system that does not require her to poke herself as often  [employer]

## 2018-05-11 ENCOUNTER — ROUTINE PRENATAL (OUTPATIENT)
Dept: PERINATAL CARE | Facility: MEDICAL CENTER | Age: 33
End: 2018-05-11
Payer: COMMERCIAL

## 2018-05-11 VITALS
HEIGHT: 60 IN | BODY MASS INDEX: 35.83 KG/M2 | WEIGHT: 182.5 LBS | DIASTOLIC BLOOD PRESSURE: 73 MMHG | SYSTOLIC BLOOD PRESSURE: 113 MMHG | HEART RATE: 108 BPM

## 2018-05-11 DIAGNOSIS — O24.414 INSULIN CONTROLLED GESTATIONAL DIABETES MELLITUS (GDM) IN THIRD TRIMESTER: ICD-10-CM

## 2018-05-11 DIAGNOSIS — Z3A.34 34 WEEKS GESTATION OF PREGNANCY: ICD-10-CM

## 2018-05-11 DIAGNOSIS — O99.213 OBESITY AFFECTING PREGNANCY IN THIRD TRIMESTER: ICD-10-CM

## 2018-05-11 PROCEDURE — 59025 FETAL NON-STRESS TEST: CPT | Performed by: OBSTETRICS & GYNECOLOGY

## 2018-05-11 NOTE — PROGRESS NOTES
[facility] [place]: Ms Meek Reece was seen today at 34w0d for NST (found under the pregnancy episode) which I reviewed the RN assessment and agree  Please don't hesitate to contact our office with any concerns or questions    Cori Humphreys MD

## 2018-05-15 ENCOUNTER — ROUTINE PRENATAL (OUTPATIENT)
Dept: PERINATAL CARE | Facility: MEDICAL CENTER | Age: 33
End: 2018-05-15
Payer: COMMERCIAL

## 2018-05-15 VITALS
HEIGHT: 60 IN | SYSTOLIC BLOOD PRESSURE: 100 MMHG | BODY MASS INDEX: 37.11 KG/M2 | WEIGHT: 189 LBS | DIASTOLIC BLOOD PRESSURE: 68 MMHG | HEART RATE: 104 BPM

## 2018-05-15 DIAGNOSIS — Z3A.34 34 WEEKS GESTATION OF PREGNANCY: ICD-10-CM

## 2018-05-15 DIAGNOSIS — O99.213 OBESITY AFFECTING PREGNANCY IN THIRD TRIMESTER: ICD-10-CM

## 2018-05-15 DIAGNOSIS — O24.414 INSULIN CONTROLLED GESTATIONAL DIABETES MELLITUS (GDM) IN THIRD TRIMESTER: ICD-10-CM

## 2018-05-15 PROCEDURE — 76815 OB US LIMITED FETUS(S): CPT | Performed by: OBSTETRICS & GYNECOLOGY

## 2018-05-15 PROCEDURE — 59025 FETAL NON-STRESS TEST: CPT | Performed by: OBSTETRICS & GYNECOLOGY

## 2018-05-18 ENCOUNTER — ROUTINE PRENATAL (OUTPATIENT)
Dept: PERINATAL CARE | Facility: MEDICAL CENTER | Age: 33
End: 2018-05-18
Payer: COMMERCIAL

## 2018-05-18 VITALS
DIASTOLIC BLOOD PRESSURE: 78 MMHG | HEART RATE: 98 BPM | HEIGHT: 60 IN | BODY MASS INDEX: 35.69 KG/M2 | WEIGHT: 181.8 LBS | SYSTOLIC BLOOD PRESSURE: 108 MMHG

## 2018-05-18 DIAGNOSIS — Z3A.35 35 WEEKS GESTATION OF PREGNANCY: ICD-10-CM

## 2018-05-18 DIAGNOSIS — O24.414 INSULIN CONTROLLED GESTATIONAL DIABETES MELLITUS (GDM) IN THIRD TRIMESTER: ICD-10-CM

## 2018-05-18 DIAGNOSIS — O99.213 OBESITY AFFECTING PREGNANCY IN THIRD TRIMESTER: ICD-10-CM

## 2018-05-18 PROCEDURE — 59025 FETAL NON-STRESS TEST: CPT | Performed by: OBSTETRICS & GYNECOLOGY

## 2018-05-18 NOTE — PROGRESS NOTES
NST procedure and expected outcome explained to patient  Daily fetal kick count reviewed  Patient verbalized understanding of all and was receptive      Tabitha Gomes RN

## 2018-05-18 NOTE — PROGRESS NOTES
[facility] [place]: Ms Meek Reece was seen today at 35w0d for NST (found under the pregnancy episode) which I reviewed the RN assessment and agree  Please don't hesitate to contact our office with any concerns or questions    Cori Humphreys MD

## 2018-05-22 ENCOUNTER — ROUTINE PRENATAL (OUTPATIENT)
Dept: PERINATAL CARE | Facility: MEDICAL CENTER | Age: 33
End: 2018-05-22
Payer: COMMERCIAL

## 2018-05-22 VITALS
HEART RATE: 97 BPM | BODY MASS INDEX: 36.24 KG/M2 | DIASTOLIC BLOOD PRESSURE: 65 MMHG | HEIGHT: 60 IN | WEIGHT: 184.6 LBS | SYSTOLIC BLOOD PRESSURE: 109 MMHG

## 2018-05-22 DIAGNOSIS — O24.414 INSULIN CONTROLLED GESTATIONAL DIABETES MELLITUS (GDM) IN THIRD TRIMESTER: Primary | ICD-10-CM

## 2018-05-22 DIAGNOSIS — Z3A.35 35 WEEKS GESTATION OF PREGNANCY: ICD-10-CM

## 2018-05-22 DIAGNOSIS — O99.213 OBESITY AFFECTING PREGNANCY IN THIRD TRIMESTER: ICD-10-CM

## 2018-05-22 PROCEDURE — 59025 FETAL NON-STRESS TEST: CPT | Performed by: OBSTETRICS & GYNECOLOGY

## 2018-05-22 PROCEDURE — 76815 OB US LIMITED FETUS(S): CPT | Performed by: OBSTETRICS & GYNECOLOGY

## 2018-05-22 NOTE — PROGRESS NOTES
NST procedure and expected outcome explained to patient  Daily fetal kick count reviewed  Patient verbalized understanding of all and was receptive      Jazzy Curry RN

## 2018-05-23 ENCOUNTER — ROUTINE PRENATAL (OUTPATIENT)
Dept: OBGYN CLINIC | Facility: MEDICAL CENTER | Age: 33
End: 2018-05-23

## 2018-05-23 VITALS — DIASTOLIC BLOOD PRESSURE: 78 MMHG | BODY MASS INDEX: 35.54 KG/M2 | SYSTOLIC BLOOD PRESSURE: 114 MMHG | WEIGHT: 182 LBS

## 2018-05-23 DIAGNOSIS — O09.93 SUPERVISION OF HIGH RISK PREGNANCY IN THIRD TRIMESTER: Primary | ICD-10-CM

## 2018-05-23 DIAGNOSIS — O24.414 INSULIN CONTROLLED GESTATIONAL DIABETES MELLITUS (GDM) IN THIRD TRIMESTER: ICD-10-CM

## 2018-05-23 DIAGNOSIS — O99.213 OBESITY AFFECTING PREGNANCY IN THIRD TRIMESTER: ICD-10-CM

## 2018-05-23 DIAGNOSIS — Z3A.35 35 WEEKS GESTATION OF PREGNANCY: ICD-10-CM

## 2018-05-23 PROCEDURE — PNV: Performed by: NURSE PRACTITIONER

## 2018-05-23 NOTE — PROGRESS NOTES
Problem List Items Addressed This Visit     Obesity affecting pregnancy in third trimester     Good fetal movement  Diligent FKCs  Next NST/RJ is on Friday at [facility]  APFS have been reassuring thus far  Insulin controlled gestational diabetes mellitus (GDM) in third trimester     SubQ port for BG monitoring (FreeStyle Abreu)  Reports fasting BS in 84-92 range  Compliant with weekly reporting to [facility]  35 weeks gestation of pregnancy     GBS bacteriuria, no need for GBS swab  Supervision of high risk pregnancy in third trimester - Primary     Doing well  Denies LOF, VB, CTX  Pelvic pressure  Has [facility] scan on 6/1 to eval for presence of stomach bubble  Return in 1 week               Ora Cooler

## 2018-05-25 ENCOUNTER — ROUTINE PRENATAL (OUTPATIENT)
Dept: PERINATAL CARE | Facility: MEDICAL CENTER | Age: 33
End: 2018-05-25
Payer: COMMERCIAL

## 2018-05-25 VITALS
HEIGHT: 60 IN | SYSTOLIC BLOOD PRESSURE: 106 MMHG | HEART RATE: 103 BPM | WEIGHT: 184 LBS | DIASTOLIC BLOOD PRESSURE: 73 MMHG | BODY MASS INDEX: 36.12 KG/M2

## 2018-05-25 DIAGNOSIS — Z3A.36 36 WEEKS GESTATION OF PREGNANCY: ICD-10-CM

## 2018-05-25 DIAGNOSIS — O99.213 OBESITY AFFECTING PREGNANCY IN THIRD TRIMESTER: ICD-10-CM

## 2018-05-25 DIAGNOSIS — O24.414 INSULIN CONTROLLED GESTATIONAL DIABETES MELLITUS (GDM) IN THIRD TRIMESTER: Primary | ICD-10-CM

## 2018-05-25 PROCEDURE — 59025 FETAL NON-STRESS TEST: CPT | Performed by: OBSTETRICS & GYNECOLOGY

## 2018-05-25 NOTE — PROGRESS NOTES
NST procedure and expected outcome explained to patient  Daily fetal kick count reviewed  Patient verbalized understanding of all and was receptive      Jailyn Summers RN

## 2018-05-29 ENCOUNTER — ROUTINE PRENATAL (OUTPATIENT)
Dept: OBGYN CLINIC | Facility: MEDICAL CENTER | Age: 33
End: 2018-05-29

## 2018-05-29 VITALS — BODY MASS INDEX: 36.72 KG/M2 | WEIGHT: 188 LBS | DIASTOLIC BLOOD PRESSURE: 62 MMHG | SYSTOLIC BLOOD PRESSURE: 104 MMHG

## 2018-05-29 DIAGNOSIS — O99.213 OBESITY AFFECTING PREGNANCY IN THIRD TRIMESTER: ICD-10-CM

## 2018-05-29 DIAGNOSIS — Z3A.36 36 WEEKS GESTATION OF PREGNANCY: ICD-10-CM

## 2018-05-29 DIAGNOSIS — O24.414 INSULIN CONTROLLED GESTATIONAL DIABETES MELLITUS (GDM) IN THIRD TRIMESTER: ICD-10-CM

## 2018-05-29 DIAGNOSIS — Z34.83 PRENATAL CARE, SUBSEQUENT PREGNANCY, THIRD TRIMESTER: Primary | ICD-10-CM

## 2018-05-29 PROCEDURE — PNV: Performed by: OBSTETRICS & GYNECOLOGY

## 2018-05-29 NOTE — PROGRESS NOTES
Problem List Items Addressed This Visit        Endocrine    Insulin controlled gestational diabetes mellitus (GDM) in third trimester     Running normal blood sugars            Other    Obesity affecting pregnancy in third trimester    36 weeks gestation of pregnancy    Prenatal care, subsequent pregnancy, third trimester - Primary     Has tingling in her right arm, no motor weakness  Has pelvic pressure  Has normal fetal movement

## 2018-05-30 ENCOUNTER — ROUTINE PRENATAL (OUTPATIENT)
Dept: PERINATAL CARE | Facility: MEDICAL CENTER | Age: 33
End: 2018-05-30
Payer: COMMERCIAL

## 2018-05-30 ENCOUNTER — ROUTINE PRENATAL (OUTPATIENT)
Dept: OBGYN CLINIC | Facility: MEDICAL CENTER | Age: 33
End: 2018-05-30

## 2018-05-30 VITALS
BODY MASS INDEX: 36.71 KG/M2 | SYSTOLIC BLOOD PRESSURE: 105 MMHG | DIASTOLIC BLOOD PRESSURE: 74 MMHG | HEART RATE: 96 BPM | WEIGHT: 187 LBS | HEIGHT: 60 IN

## 2018-05-30 VITALS — DIASTOLIC BLOOD PRESSURE: 70 MMHG | WEIGHT: 184.6 LBS | BODY MASS INDEX: 36.05 KG/M2 | SYSTOLIC BLOOD PRESSURE: 100 MMHG

## 2018-05-30 DIAGNOSIS — O24.414 INSULIN CONTROLLED GESTATIONAL DIABETES MELLITUS (GDM) IN THIRD TRIMESTER: ICD-10-CM

## 2018-05-30 DIAGNOSIS — Z34.83 PRENATAL CARE, SUBSEQUENT PREGNANCY, THIRD TRIMESTER: Primary | ICD-10-CM

## 2018-05-30 DIAGNOSIS — B95.1 GROUP B STREPTOCOCCUS URINARY TRACT INFECTION AFFECTING PREGNANCY, ANTEPARTUM: ICD-10-CM

## 2018-05-30 DIAGNOSIS — Z3A.36 36 WEEKS GESTATION OF PREGNANCY: ICD-10-CM

## 2018-05-30 DIAGNOSIS — O23.40 GROUP B STREPTOCOCCUS URINARY TRACT INFECTION AFFECTING PREGNANCY, ANTEPARTUM: ICD-10-CM

## 2018-05-30 DIAGNOSIS — O99.213 OBESITY AFFECTING PREGNANCY IN THIRD TRIMESTER: ICD-10-CM

## 2018-05-30 PROCEDURE — 59025 FETAL NON-STRESS TEST: CPT | Performed by: OBSTETRICS & GYNECOLOGY

## 2018-05-30 PROCEDURE — PNV: Performed by: NURSE PRACTITIONER

## 2018-05-30 NOTE — PROGRESS NOTES
Patient Active Problem List   Diagnosis    GBS (group B streptococcus) UTI complicating pregnancy    Migraines    Obesity affecting pregnancy in third trimester    Insulin controlled gestational diabetes mellitus (GDM) in third trimester    36 weeks gestation of pregnancy    Abnormal glucose    Supervision of high risk pregnancy in third trimester    Prenatal care, subsequent pregnancy, third trimester     A2GDM  Came right from [facility], where toco showed contractions Q2-5m  Patient is well in no distress  Feels "off" but no noticeable CTX  Denies LOF or VB  Reports good FM  Cervical exam: 2/60/-2  Labor precautions reviewed with Evaline Salines  Return in 1 week      Saturnino Bernstein

## 2018-05-30 NOTE — PROGRESS NOTES
NST procedure and expected outcome explained to patient  Daily fetal kick count reviewed  I also reviewed labor precaution with Danay Almanzar  Patient verbalized understanding of all and was receptive      Jessica Arora RN

## 2018-06-01 ENCOUNTER — TELEPHONE (OUTPATIENT)
Dept: PERINATAL CARE | Facility: CLINIC | Age: 33
End: 2018-06-01

## 2018-06-01 ENCOUNTER — ULTRASOUND (OUTPATIENT)
Dept: PERINATAL CARE | Facility: MEDICAL CENTER | Age: 33
End: 2018-06-01
Payer: COMMERCIAL

## 2018-06-01 VITALS
DIASTOLIC BLOOD PRESSURE: 77 MMHG | WEIGHT: 188.3 LBS | HEART RATE: 97 BPM | HEIGHT: 60 IN | BODY MASS INDEX: 36.97 KG/M2 | SYSTOLIC BLOOD PRESSURE: 126 MMHG

## 2018-06-01 DIAGNOSIS — Z3A.37 37 WEEKS GESTATION OF PREGNANCY: ICD-10-CM

## 2018-06-01 DIAGNOSIS — O36.63X0 EXCESSIVE FETAL GROWTH AFFECTING MANAGEMENT OF PREGNANCY IN THIRD TRIMESTER, SINGLE OR UNSPECIFIED FETUS: ICD-10-CM

## 2018-06-01 DIAGNOSIS — O99.213 OBESITY AFFECTING PREGNANCY IN THIRD TRIMESTER: ICD-10-CM

## 2018-06-01 DIAGNOSIS — O24.414 INSULIN CONTROLLED GESTATIONAL DIABETES MELLITUS (GDM) IN THIRD TRIMESTER: Primary | ICD-10-CM

## 2018-06-01 PROCEDURE — 76816 OB US FOLLOW-UP PER FETUS: CPT | Performed by: OBSTETRICS & GYNECOLOGY

## 2018-06-01 PROCEDURE — 76818 FETAL BIOPHYS PROFILE W/NST: CPT | Performed by: OBSTETRICS & GYNECOLOGY

## 2018-06-01 PROCEDURE — 99212 OFFICE O/P EST SF 10 MIN: CPT | Performed by: OBSTETRICS & GYNECOLOGY

## 2018-06-01 NOTE — TELEPHONE ENCOUNTER
Date:  18  RE: Fuad Nunez    : 1985  DEUCE: Estimated Date of Delivery: 18  EGA: 37w0d  ObGyn: LARRY    Insulin controlled gestational diabetes  Date Fasting Post-  breakfast Post-  lunch Post-  dinner Before bedtime Carbs Comments   18 91 107 116 109      18 87 111 97 112      18 91 93 84 72      18 87 104 101 102      18 80 101 74 110      18 90 106 100 103      18 89 102 101 112      18 81 97 100 115      18 80 60 98 106      18 88 70 101 111      18 90 101 90 101      18 80 109            Current regimen:  Levermir--16 units at HS  1900 calorie gestational diabetes diet with 3 meals/snacks  Self monitoring blood glucose fasting and 2 hours after meals with Accu-chek Guide meter recommended  Patient refuses to test herself; so  is doing it twice daily for FBS & 2 hr pp dinner  Patient is also using Free Style Kalee CGM device to track glucose which isn't approved for treatment during pregnancy  Patient stated she has been emailing her BG results [facility], but none have been received  Advised patient to phone her results to the program instead  Plan:  Continue 16 units Levemir at HS  Continue 1900 calorie gestational diabetes diet with 3 meals/snacks with only protein for bedtime snack  Continue monitoring BG 4 times daily via fingerstick  & Kalee  Advised patient to now monitor 1 hr pp meals due to abdominal circumference of >95% of fetus      Date due to report next:  Monday, 18 via phone call     [facility]  Diabetes Educator   Diabetes [facility] Pregnancy Program

## 2018-06-04 ENCOUNTER — TELEPHONE (OUTPATIENT)
Dept: PERINATAL CARE | Facility: CLINIC | Age: 33
End: 2018-06-04

## 2018-06-04 NOTE — TELEPHONE ENCOUNTER
Date:  18  RE: Jamil Miller    : 1985  Estimated Date of Delivery: 18  EGA: 37w3d  OB/GYN: Lexy Powell    Date Fasting Post-  breakfast Post-  lunch Post-  dinner Before bedtime Carbs Comments   6-1   113 101      6-2 90 121 107 124      6-3 91 132 74 101      6-4 85 118                                          Current regimen:  Jack--16 units at HS  1900 calorie gestational diabetes diet with 3 meals/snacks  Self monitoring blood glucose fasting and 1 hour after meals with Accu-chek Guide meter recommended  Patient refuses to test herself; so  is doing it twice daily for FBS & 2 hr pp dinner  Patient is also using Free Style Kalee CGM device to track glucose which isn't approved for treatment during pregnancy  Plan:  Continue current regimen  18 A1c 5 1%      Date due to report next:  Monday, 18    Lucille Shah RN  Diabetes Educator   [facility]regnancy [facility]

## 2018-06-05 ENCOUNTER — ROUTINE PRENATAL (OUTPATIENT)
Dept: PERINATAL CARE | Facility: MEDICAL CENTER | Age: 33
End: 2018-06-05
Payer: COMMERCIAL

## 2018-06-05 VITALS
BODY MASS INDEX: 36.63 KG/M2 | DIASTOLIC BLOOD PRESSURE: 88 MMHG | WEIGHT: 186.6 LBS | HEIGHT: 60 IN | HEART RATE: 100 BPM | SYSTOLIC BLOOD PRESSURE: 134 MMHG

## 2018-06-05 DIAGNOSIS — Z3A.37 37 WEEKS GESTATION OF PREGNANCY: ICD-10-CM

## 2018-06-05 DIAGNOSIS — O24.414 INSULIN CONTROLLED GESTATIONAL DIABETES MELLITUS (GDM) IN THIRD TRIMESTER: ICD-10-CM

## 2018-06-05 PROCEDURE — 59025 FETAL NON-STRESS TEST: CPT | Performed by: OBSTETRICS & GYNECOLOGY

## 2018-06-05 NOTE — PROGRESS NOTES
NST procedure and expected outcome explained to patient  Daily fetal kick count reviewed  Patient verbalized understanding of all and was receptive      Kelsey Johnson RN

## 2018-06-06 ENCOUNTER — ROUTINE PRENATAL (OUTPATIENT)
Dept: OBGYN CLINIC | Facility: MEDICAL CENTER | Age: 33
End: 2018-06-06

## 2018-06-06 VITALS — BODY MASS INDEX: 36.36 KG/M2 | WEIGHT: 186.2 LBS | SYSTOLIC BLOOD PRESSURE: 126 MMHG | DIASTOLIC BLOOD PRESSURE: 80 MMHG

## 2018-06-06 DIAGNOSIS — O36.63X0 EXCESSIVE FETAL GROWTH AFFECTING MANAGEMENT OF PREGNANCY IN THIRD TRIMESTER, SINGLE OR UNSPECIFIED FETUS: ICD-10-CM

## 2018-06-06 DIAGNOSIS — O09.93 SUPERVISION OF HIGH RISK PREGNANCY IN THIRD TRIMESTER: ICD-10-CM

## 2018-06-06 DIAGNOSIS — O24.414 INSULIN CONTROLLED GESTATIONAL DIABETES MELLITUS (GDM) IN THIRD TRIMESTER: ICD-10-CM

## 2018-06-06 DIAGNOSIS — Z3A.37 37 WEEKS GESTATION OF PREGNANCY: ICD-10-CM

## 2018-06-06 DIAGNOSIS — O23.43 GROUP B STREPTOCOCCUS URINARY TRACT INFECTION AFFECTING PREGNANCY IN THIRD TRIMESTER: Primary | ICD-10-CM

## 2018-06-06 DIAGNOSIS — B95.1 GROUP B STREPTOCOCCUS URINARY TRACT INFECTION AFFECTING PREGNANCY IN THIRD TRIMESTER: Primary | ICD-10-CM

## 2018-06-06 PROBLEM — R73.09 ABNORMAL GLUCOSE: Status: RESOLVED | Noted: 2018-04-05 | Resolved: 2018-06-06

## 2018-06-06 PROCEDURE — PNV: Performed by: NURSE PRACTITIONER

## 2018-06-06 NOTE — ASSESSMENT & PLAN NOTE
BGs well controlled on current insulin regimen per pt  18: EFW 81%, AC >95%, RJ 18  MFM notes suggest inconsistent reporting of BGs and compliance concerns - the patient reports she has been monitoring consistently with fingersticks and continuous meter and has been reporting appropriately  F/u growth and 2x/wkly  testing are scheduled  Discussed plans for IOL at 39+ weeks

## 2018-06-06 NOTE — ASSESSMENT & PLAN NOTE
Denies OB complaints  Good fetal movement  Denies contractions, cramping, leakage of fluid or vaginal bleeding  Baby and Me considerations reinforced  Reviewed labor precautions and FKCs

## 2018-06-06 NOTE — PROGRESS NOTES
Problem List Items Addressed This Visit     GBS (group B streptococcus) UTI complicating pregnancy - Primary     The patient is aware of plans for abx in labor given h/o GBS bacteruria with current preg  Insulin controlled gestational diabetes mellitus (GDM) in third trimester     BGs well controlled on current insulin regimen per pt  18: EFW 81%, AC >95%, RJ 18  MFM notes suggest inconsistent reporting of BGs and compliance concerns - the patient reports she has been monitoring consistently with fingersticks and continuous meter and has been reporting appropriately  F/u growth and 2x/wkly  testing are scheduled  Discussed plans for IOL at 39+ weeks  37 weeks gestation of pregnancy    Supervision of high risk pregnancy in third trimester     Denies OB complaints  Good fetal movement  Denies contractions, cramping, leakage of fluid or vaginal bleeding  Baby and Me considerations reinforced  Reviewed labor precautions and FKCs  Excessive fetal growth affecting management of pregnancy in third trimester     Discussed macrosomia considerations and all questions answered

## 2018-06-08 ENCOUNTER — ROUTINE PRENATAL (OUTPATIENT)
Dept: PERINATAL CARE | Facility: MEDICAL CENTER | Age: 33
End: 2018-06-08
Payer: COMMERCIAL

## 2018-06-08 VITALS
BODY MASS INDEX: 36.32 KG/M2 | SYSTOLIC BLOOD PRESSURE: 116 MMHG | WEIGHT: 185 LBS | HEIGHT: 60 IN | DIASTOLIC BLOOD PRESSURE: 85 MMHG | HEART RATE: 90 BPM

## 2018-06-08 DIAGNOSIS — O24.414 INSULIN CONTROLLED GESTATIONAL DIABETES MELLITUS (GDM) IN THIRD TRIMESTER: ICD-10-CM

## 2018-06-08 DIAGNOSIS — Z3A.38 38 WEEKS GESTATION OF PREGNANCY: ICD-10-CM

## 2018-06-08 PROCEDURE — 76815 OB US LIMITED FETUS(S): CPT | Performed by: OBSTETRICS & GYNECOLOGY

## 2018-06-08 PROCEDURE — 59025 FETAL NON-STRESS TEST: CPT | Performed by: OBSTETRICS & GYNECOLOGY

## 2018-06-08 NOTE — PROGRESS NOTES
NST procedure and expected outcome explained to patient  Daily fetal kick count reviewed  Patient verbalized understanding of all and was receptive      Pita Salas RN

## 2018-06-11 ENCOUNTER — TELEPHONE (OUTPATIENT)
Dept: PERINATAL CARE | Facility: CLINIC | Age: 33
End: 2018-06-11

## 2018-06-11 ENCOUNTER — HOSPITAL ENCOUNTER (INPATIENT)
Facility: HOSPITAL | Age: 33
LOS: 1 days | Discharge: HOME | End: 2018-06-13
Attending: OBSTETRICS & GYNECOLOGY | Admitting: OBSTETRICS & GYNECOLOGY
Payer: COMMERCIAL

## 2018-06-11 DIAGNOSIS — Z3A.38 38 WEEKS GESTATION OF PREGNANCY: ICD-10-CM

## 2018-06-11 PROCEDURE — 99214 OFFICE O/P EST MOD 30 MIN: CPT

## 2018-06-11 NOTE — TELEPHONE ENCOUNTER
Date:  18  RE: Catrachita Haque    : 1985  Estimated Date of Delivery: 18  EGA: 38w3d  OB/GYN: José Reyes      Date Fasting Post-  breakfast Post-  lunch Post-  dinner Before bedtime Carbs Comments   6-4   117 91      6-5 90 120 133 87      6-6 83 126 115 132      6-7 83 116 94 124      6-8 90 NR       6-9 91 80 108 84      6-10 80 111 100 116      6-11 90             Current regimen:  Kitamir--16 units at HS  1900 calorie gestational diabetes diet with 3 meals/snacks  Self monitoring blood glucose fasting and 1 hour after meals with Accu-chek Guide meter  Patient refuses to test herself; so  is doing it twice daily for FBS & 2 hr pp dinner except on weekends  No longer using Free Luis-Hill  Plan:  continue current regimen      Date due to report next:  Monday, 18    Jassi Bowen RN  Diabetes Educator   [facility]regnancy [facility]

## 2018-06-12 ENCOUNTER — ANESTHESIA EVENT (INPATIENT)
Dept: LABOR AND DELIVERY | Facility: HOSPITAL | Age: 33
End: 2018-06-12
Payer: COMMERCIAL

## 2018-06-12 ENCOUNTER — ANESTHESIA (INPATIENT)
Dept: LABOR AND DELIVERY | Facility: HOSPITAL | Age: 33
End: 2018-06-12
Payer: COMMERCIAL

## 2018-06-12 PROBLEM — O36.63X0 EXCESSIVE FETAL GROWTH AFFECTING MANAGEMENT OF PREGNANCY IN THIRD TRIMESTER: Status: RESOLVED | Noted: 2018-06-01 | Resolved: 2018-06-12

## 2018-06-12 PROBLEM — O09.93 SUPERVISION OF HIGH RISK PREGNANCY IN THIRD TRIMESTER: Status: RESOLVED | Noted: 2018-04-25 | Resolved: 2018-06-12

## 2018-06-12 PROBLEM — O99.213 OBESITY AFFECTING PREGNANCY IN THIRD TRIMESTER: Status: RESOLVED | Noted: 2018-02-09 | Resolved: 2018-06-12

## 2018-06-12 PROBLEM — Z34.83 PRENATAL CARE, SUBSEQUENT PREGNANCY, THIRD TRIMESTER: Status: RESOLVED | Noted: 2018-05-29 | Resolved: 2018-06-12

## 2018-06-12 PROBLEM — O24.414 INSULIN CONTROLLED GESTATIONAL DIABETES MELLITUS (GDM) IN THIRD TRIMESTER: Status: RESOLVED | Noted: 2018-03-26 | Resolved: 2018-06-12

## 2018-06-12 PROBLEM — B95.1 GBS (GROUP B STREPTOCOCCUS) UTI COMPLICATING PREGNANCY: Status: RESOLVED | Noted: 2017-12-14 | Resolved: 2018-06-12

## 2018-06-12 PROBLEM — O23.40 GBS (GROUP B STREPTOCOCCUS) UTI COMPLICATING PREGNANCY: Status: RESOLVED | Noted: 2017-12-14 | Resolved: 2018-06-12

## 2018-06-12 PROBLEM — O42.90 PREMATURE RUPTURE OF MEMBRANES IN PREGNANCY: Status: ACTIVE | Noted: 2018-06-12

## 2018-06-12 PROBLEM — O42.90 PREMATURE RUPTURE OF MEMBRANES IN PREGNANCY: Status: RESOLVED | Noted: 2018-06-12 | Resolved: 2018-06-12

## 2018-06-12 LAB
ABO GROUP BLD: NORMAL
BASE EXCESS BLDCOA CALC-SCNC: -7.2 MMOL/L (ref 3–11)
BASE EXCESS BLDCOV CALC-SCNC: -4.6 MMOL/L (ref 1–9)
BASOPHILS # BLD AUTO: 0.05 THOUSANDS/ΜL (ref 0–0.1)
BASOPHILS NFR BLD AUTO: 0 % (ref 0–1)
BLD GP AB SCN SERPL QL: NEGATIVE
EOSINOPHIL # BLD AUTO: 0.11 THOUSAND/ΜL (ref 0–0.61)
EOSINOPHIL NFR BLD AUTO: 1 % (ref 0–6)
ERYTHROCYTE [DISTWIDTH] IN BLOOD BY AUTOMATED COUNT: 14.2 % (ref 11.6–15.1)
EXTERNAL GROUP B STREP ANTIGEN: POSITIVE
GLUCOSE SERPL-MCNC: 63 MG/DL (ref 65–140)
GLUCOSE SERPL-MCNC: 68 MG/DL (ref 65–140)
GLUCOSE SERPL-MCNC: 71 MG/DL (ref 65–140)
GLUCOSE SERPL-MCNC: 74 MG/DL (ref 65–140)
GLUCOSE SERPL-MCNC: 74 MG/DL (ref 65–140)
GLUCOSE SERPL-MCNC: 76 MG/DL (ref 65–140)
GLUCOSE SERPL-MCNC: 76 MG/DL (ref 65–140)
GLUCOSE SERPL-MCNC: 79 MG/DL (ref 65–140)
HCO3 BLDCOA-SCNC: 21.7 MMOL/L (ref 17.3–27.3)
HCO3 BLDCOV-SCNC: 18.3 MMOL/L (ref 12.2–28.6)
HCT VFR BLD AUTO: 41.3 % (ref 34.8–46.1)
HGB BLD-MCNC: 13.4 G/DL (ref 11.5–15.4)
IMM GRANULOCYTES # BLD AUTO: 0.12 THOUSAND/UL (ref 0–0.2)
IMM GRANULOCYTES NFR BLD AUTO: 1 % (ref 0–2)
LYMPHOCYTES # BLD AUTO: 1.92 THOUSANDS/ΜL (ref 0.6–4.47)
LYMPHOCYTES NFR BLD AUTO: 17 % (ref 14–44)
MCH RBC QN AUTO: 28.6 PG (ref 26.8–34.3)
MCHC RBC AUTO-ENTMCNC: 32.4 G/DL (ref 31.4–37.4)
MCV RBC AUTO: 88 FL (ref 82–98)
MONOCYTES # BLD AUTO: 0.84 THOUSAND/ΜL (ref 0.17–1.22)
MONOCYTES NFR BLD AUTO: 7 % (ref 4–12)
NEUTROPHILS # BLD AUTO: 8.32 THOUSANDS/ΜL (ref 1.85–7.62)
NEUTS SEG NFR BLD AUTO: 74 % (ref 43–75)
NRBC BLD AUTO-RTO: 0 /100 WBCS
O2 CT VFR BLDCOA CALC: 7.2 ML/DL
OXYHGB MFR BLDCOA: 31.9 %
OXYHGB MFR BLDCOV: 87.7 %
PCO2 BLDCOA: 57.4 MM[HG] (ref 30–60)
PCO2 BLDCOV: 28.8 MM HG (ref 27–43)
PH BLDCOA: 7.2 [PH] (ref 7.23–7.43)
PH BLDCOV: 7.42 [PH] (ref 7.19–7.49)
PLATELET # BLD AUTO: 230 THOUSANDS/UL (ref 149–390)
PMV BLD AUTO: 11.1 FL (ref 8.9–12.7)
PO2 BLDCOA: 18.5 MM HG (ref 5–25)
PO2 BLDCOV: 44 MM HG (ref 15–45)
RBC # BLD AUTO: 4.69 MILLION/UL (ref 3.81–5.12)
RH BLD: POSITIVE
RPR SER QL: NORMAL
SAO2 % BLDCOV: 20.1 ML/DL
SPECIMEN EXPIRATION DATE: NORMAL
WBC # BLD AUTO: 11.36 THOUSAND/UL (ref 4.31–10.16)

## 2018-06-12 PROCEDURE — 82948 REAGENT STRIP/BLOOD GLUCOSE: CPT

## 2018-06-12 PROCEDURE — 85025 COMPLETE CBC W/AUTO DIFF WBC: CPT | Performed by: OBSTETRICS & GYNECOLOGY

## 2018-06-12 PROCEDURE — 86900 BLOOD TYPING SEROLOGIC ABO: CPT | Performed by: OBSTETRICS & GYNECOLOGY

## 2018-06-12 PROCEDURE — 86850 RBC ANTIBODY SCREEN: CPT | Performed by: OBSTETRICS & GYNECOLOGY

## 2018-06-12 PROCEDURE — 86901 BLOOD TYPING SEROLOGIC RH(D): CPT | Performed by: OBSTETRICS & GYNECOLOGY

## 2018-06-12 PROCEDURE — 82805 BLOOD GASES W/O2 SATURATION: CPT | Performed by: OBSTETRICS & GYNECOLOGY

## 2018-06-12 PROCEDURE — 59400 OBSTETRICAL CARE: CPT | Performed by: OBSTETRICS & GYNECOLOGY

## 2018-06-12 PROCEDURE — 4A1HXCZ MONITORING OF PRODUCTS OF CONCEPTION, CARDIAC RATE, EXTERNAL APPROACH: ICD-10-PCS | Performed by: OBSTETRICS & GYNECOLOGY

## 2018-06-12 PROCEDURE — 86592 SYPHILIS TEST NON-TREP QUAL: CPT | Performed by: OBSTETRICS & GYNECOLOGY

## 2018-06-12 RX ORDER — DOCUSATE SODIUM 100 MG/1
100 CAPSULE, LIQUID FILLED ORAL 2 TIMES DAILY PRN
Status: DISCONTINUED | OUTPATIENT
Start: 2018-06-12 | End: 2018-06-13 | Stop reason: HOSPADM

## 2018-06-12 RX ORDER — METOCLOPRAMIDE HYDROCHLORIDE 5 MG/ML
10 INJECTION INTRAMUSCULAR; INTRAVENOUS ONCE
Status: COMPLETED | OUTPATIENT
Start: 2018-06-12 | End: 2018-06-12

## 2018-06-12 RX ORDER — OXYCODONE HYDROCHLORIDE AND ACETAMINOPHEN 5; 325 MG/1; MG/1
2 TABLET ORAL EVERY 4 HOURS PRN
Status: DISCONTINUED | OUTPATIENT
Start: 2018-06-12 | End: 2018-06-13 | Stop reason: HOSPADM

## 2018-06-12 RX ORDER — METOCLOPRAMIDE HYDROCHLORIDE 5 MG/ML
10 INJECTION INTRAMUSCULAR; INTRAVENOUS EVERY 6 HOURS PRN
Status: DISCONTINUED | OUTPATIENT
Start: 2018-06-12 | End: 2018-06-13 | Stop reason: HOSPADM

## 2018-06-12 RX ORDER — IBUPROFEN 600 MG/1
600 TABLET ORAL EVERY 6 HOURS PRN
Status: DISCONTINUED | OUTPATIENT
Start: 2018-06-12 | End: 2018-06-13 | Stop reason: HOSPADM

## 2018-06-12 RX ORDER — DIPHENHYDRAMINE HCL 25 MG
25 TABLET ORAL EVERY 6 HOURS PRN
Status: DISCONTINUED | OUTPATIENT
Start: 2018-06-12 | End: 2018-06-13 | Stop reason: HOSPADM

## 2018-06-12 RX ORDER — OXYCODONE HYDROCHLORIDE AND ACETAMINOPHEN 5; 325 MG/1; MG/1
1 TABLET ORAL EVERY 4 HOURS PRN
Status: DISCONTINUED | OUTPATIENT
Start: 2018-06-12 | End: 2018-06-13 | Stop reason: HOSPADM

## 2018-06-12 RX ORDER — ALBUTEROL SULFATE 90 UG/1
2 AEROSOL, METERED RESPIRATORY (INHALATION) EVERY 6 HOURS PRN
Status: DISCONTINUED | OUTPATIENT
Start: 2018-06-12 | End: 2018-06-13 | Stop reason: HOSPADM

## 2018-06-12 RX ORDER — DIAPER,BRIEF,INFANT-TODD,DISP
1 EACH MISCELLANEOUS AS NEEDED
Status: DISCONTINUED | OUTPATIENT
Start: 2018-06-12 | End: 2018-06-13 | Stop reason: HOSPADM

## 2018-06-12 RX ORDER — MAGNESIUM HYDROXIDE/ALUMINUM HYDROXICE/SIMETHICONE 120; 1200; 1200 MG/30ML; MG/30ML; MG/30ML
15 SUSPENSION ORAL EVERY 6 HOURS PRN
Status: DISCONTINUED | OUTPATIENT
Start: 2018-06-12 | End: 2018-06-13 | Stop reason: HOSPADM

## 2018-06-12 RX ORDER — SIMETHICONE 80 MG
80 TABLET,CHEWABLE ORAL 4 TIMES DAILY PRN
Status: DISCONTINUED | OUTPATIENT
Start: 2018-06-12 | End: 2018-06-13 | Stop reason: HOSPADM

## 2018-06-12 RX ORDER — CALCIUM CARBONATE 200(500)MG
1000 TABLET,CHEWABLE ORAL DAILY PRN
Status: DISCONTINUED | OUTPATIENT
Start: 2018-06-12 | End: 2018-06-13 | Stop reason: HOSPADM

## 2018-06-12 RX ORDER — LIDOCAINE HYDROCHLORIDE AND EPINEPHRINE 20; 5 MG/ML; UG/ML
INJECTION, SOLUTION EPIDURAL; INFILTRATION; INTRACAUDAL; PERINEURAL AS NEEDED
Status: DISCONTINUED | OUTPATIENT
Start: 2018-06-12 | End: 2018-06-12 | Stop reason: SURG

## 2018-06-12 RX ORDER — OXYTOCIN/RINGER'S LACTATE 30/500 ML
PLASTIC BAG, INJECTION (ML) INTRAVENOUS
Status: COMPLETED
Start: 2018-06-12 | End: 2018-06-12

## 2018-06-12 RX ORDER — ACETAMINOPHEN 325 MG/1
650 TABLET ORAL EVERY 4 HOURS PRN
Status: DISCONTINUED | OUTPATIENT
Start: 2018-06-12 | End: 2018-06-13 | Stop reason: HOSPADM

## 2018-06-12 RX ORDER — SODIUM CHLORIDE 9 MG/ML
125 INJECTION, SOLUTION INTRAVENOUS CONTINUOUS
Status: DISCONTINUED | OUTPATIENT
Start: 2018-06-12 | End: 2018-06-12

## 2018-06-12 RX ORDER — OXYTOCIN/RINGER'S LACTATE 30/500 ML
250 PLASTIC BAG, INJECTION (ML) INTRAVENOUS CONTINUOUS
Status: ACTIVE | OUTPATIENT
Start: 2018-06-12 | End: 2018-06-12

## 2018-06-12 RX ORDER — ONDANSETRON 2 MG/ML
4 INJECTION INTRAMUSCULAR; INTRAVENOUS EVERY 8 HOURS PRN
Status: DISCONTINUED | OUTPATIENT
Start: 2018-06-12 | End: 2018-06-13 | Stop reason: HOSPADM

## 2018-06-12 RX ORDER — ONDANSETRON 2 MG/ML
4 INJECTION INTRAMUSCULAR; INTRAVENOUS EVERY 6 HOURS PRN
Status: DISCONTINUED | OUTPATIENT
Start: 2018-06-12 | End: 2018-06-12

## 2018-06-12 RX ADMIN — ROPIVACAINE HYDROCHLORIDE: 2 INJECTION, SOLUTION EPIDURAL; INFILTRATION at 02:41

## 2018-06-12 RX ADMIN — WITCH HAZEL 1 PAD: 500 SOLUTION RECTAL; TOPICAL at 14:47

## 2018-06-12 RX ADMIN — Medication 250 MILLI-UNITS/MIN: at 12:30

## 2018-06-12 RX ADMIN — ONDANSETRON 4 MG: 2 INJECTION, SOLUTION INTRAMUSCULAR; INTRAVENOUS at 11:50

## 2018-06-12 RX ADMIN — SODIUM CHLORIDE 125 ML/HR: 0.9 INJECTION, SOLUTION INTRAVENOUS at 02:08

## 2018-06-12 RX ADMIN — ROPIVACAINE HYDROCHLORIDE: 2 INJECTION, SOLUTION EPIDURAL; INFILTRATION at 10:51

## 2018-06-12 RX ADMIN — DOCUSATE SODIUM 100 MG: 100 CAPSULE, LIQUID FILLED ORAL at 18:00

## 2018-06-12 RX ADMIN — SODIUM CHLORIDE 125 ML/HR: 0.9 INJECTION, SOLUTION INTRAVENOUS at 04:34

## 2018-06-12 RX ADMIN — SODIUM CHLORIDE 2.5 MILLION UNITS: 9 INJECTION, SOLUTION INTRAVENOUS at 04:32

## 2018-06-12 RX ADMIN — SODIUM CHLORIDE 125 ML/HR: 0.9 INJECTION, SOLUTION INTRAVENOUS at 00:56

## 2018-06-12 RX ADMIN — SODIUM CHLORIDE 5 MILLION UNITS: 0.9 INJECTION, SOLUTION INTRAVENOUS at 00:56

## 2018-06-12 RX ADMIN — IBUPROFEN 600 MG: 600 TABLET ORAL at 15:03

## 2018-06-12 RX ADMIN — METOCLOPRAMIDE 10 MG: 5 INJECTION, SOLUTION INTRAMUSCULAR; INTRAVENOUS at 05:27

## 2018-06-12 RX ADMIN — SODIUM CHLORIDE 2.5 MILLION UNITS: 9 INJECTION, SOLUTION INTRAVENOUS at 08:39

## 2018-06-12 RX ADMIN — IBUPROFEN 600 MG: 600 TABLET ORAL at 22:14

## 2018-06-12 RX ADMIN — LIDOCAINE HYDROCHLORIDE AND EPINEPHRINE 5 ML: 20; 5 INJECTION, SOLUTION EPIDURAL; INFILTRATION; INTRACAUDAL; PERINEURAL at 02:21

## 2018-06-12 RX ADMIN — ONDANSETRON 4 MG: 2 INJECTION, SOLUTION INTRAMUSCULAR; INTRAVENOUS at 04:06

## 2018-06-12 RX ADMIN — HYDROCORTISONE 1 APPLICATION: 1 CREAM TOPICAL at 14:47

## 2018-06-12 RX ADMIN — BENZOCAINE AND LEVOMENTHOL 1 APPLICATION: 200; 5 SPRAY TOPICAL at 14:47

## 2018-06-12 NOTE — H&P
[address] 28 y o  female MRN: [id number]  Unit/Bed#: LD Triage  Encounter: [id number]    Assessment: 28 y o   at 38w4d admitted for premature rupture of membranes (18 @ 2215, clear)  SVE 3-4/60/-2, grossly ruptured, VTX  Category I FHT  Clinical EFW: 3600g  A2GDM (Levemir 18u QHS, took nightly dose)    Plan:   · Admit  · CBC, RPR, Type & Screen  · Analgesia at maternal request  · Expectant management   · PCN for GBS +  · Accuchecks per A2GDM protocol    Dr Shweta Vidal aware      Chief Complaint: leakage of fluid    HPI: Sanya Abrams is a 28 y o   with an DEUCE of 2018, by Last Menstrual Period at 38w4d who is being admitted for premature rupture of membranes  She reports large gush of clear fluid at 2215 last evening  She reports increased pain with contractions  She denies vaginal bleeding  She reports good fetal movement  History notable for:  - GBS positive, no PCN allergy  - A2GDM, on Levemir 18u QHS, last growth ultrasound 18 EFW 3414g (15%)  - Morbid obesity    Patient Active Problem List   Diagnosis    GBS (group B streptococcus) UTI complicating pregnancy    Migraines    Obesity affecting pregnancy in third trimester    Insulin controlled gestational diabetes mellitus (GDM) in third trimester    38 weeks gestation of pregnancy    Supervision of high risk pregnancy in third trimester    Prenatal care, subsequent pregnancy, third trimester    Excessive fetal growth affecting management of pregnancy in third trimester    Premature rupture of membranes in pregnancy       Baby complications/comments: none    Review of Systems   Constitutional: Negative for chills and fever  Eyes: Negative for visual disturbance  Respiratory: Negative for shortness of breath  Cardiovascular: Negative for chest pain and palpitations  Gastrointestinal: Negative for diarrhea, nausea and vomiting  Genitourinary: Negative for flank pain  Neurological: Negative for dizziness and headaches  OB History    Para Term  AB Living   2 1 1     1   SAB TAB Ectopic Multiple Live Births           1      # Outcome Date GA Lbr Vaughn/2nd Weight Sex Delivery Anes PTL Lv   2 Current            1 Term 14 38w5d  3430 g (7 lb 9 oz) F Vag-Spont   JEFF      Obstetric Comments   Normal delivery  daughter       Past Medical History:   Diagnosis Date    Bronchitis     Gestational diabetes     gd    Migraines     Last Assessed:11/15/13    Normal delivery     2014 daughter    Varicella     childhood    Vulvar dystrophy     Last Assessed:17       Past Surgical History:   Procedure Laterality Date    TOOTH EXTRACTION         Social History   Substance Use Topics    Smoking status: Former Smoker     Quit date: 2013    Smokeless tobacco: Never Used    Alcohol use No      Comment: per Allscripts: Social        Allergies   Allergen Reactions    Erythromycin Anaphylaxis and Rash     Reaction Date: ;     Pollen Extract Sneezing       Prescriptions Prior to Admission   Medication    insulin detemir (LEVEMIR FLEXTOUCH) subcutaneous injection pen 100 units/mL    Prenatal Vit-DSS-Fe Fum-FA (PRENATAL 19) 29-1 MG TABS    ACCU-CHEK FASTCLIX LANCETS MISC    ACCU-CHEK GUIDE test strip    albuterol (PROVENTIL HFA,VENTOLIN HFA) 90 mcg/act inhaler    Butalbital-APAP-Caffeine -40 MG CAPS    Insulin Pen Needle 32G X 4 MM MISC       Objective:  Temp:  [98 °F (36 7 °C)-98 9 °F (37 2 °C)] 98 °F (36 7 °C)  HR:  [102-118] 118  Resp:  [20] 20  BP: (126-141)/(78-87) 126/78  Body mass index is 36 33 kg/m²       Physical Exam:  Gen: NAD, AAOx3, Pleasant & Cooperative  Cv: RRR, No M/R/G  Resp: CTAB, No W/R/R  Abdomen: Gravid, nontender  Clinical EFW 3600g  Ext: Symmetric, non-tender      SVE:  Dilation: 3-4  Effacement (%): 60  Station: -2  Cervical Characteristics: Mid-position, Soft     Grossly ruptured, Nitrazine positive    FHT:  Baseline Rate: 135 bpm  Variability: Moderate 6-25 bpm  Accelerations: 15 x 15 or greater  Decelerations: None  FHR Category: Category I    TOCO:   Contraction Frequency (minutes): 2  Contraction Quality: Moderate    Lab Results   Component Value Date    WBC 15 79 (H) 01/28/2018    HGB 12 2 03/20/2018    HCT 37 3 03/20/2018     03/20/2018     Lab Results   Component Value Date     04/09/2018    K 3 7 04/09/2018     04/09/2018    CO2 23 04/09/2018    BUN 7 04/09/2018    CREATININE 0 49 (L) 04/09/2018    GLUCOSE 76 04/09/2018    AST 11 04/09/2018    ALT 12 04/09/2018     Prenatal Labs: reviewed     Blood type: O positive  Antibody: Negative  GBS: Positive  HIV: Nonreactive  Rubella: Immune  VDRL/RPR: Non reactive  HBsAg: Negative  Chlamydia: Negative  Gonorrhea: Negative  Diabetes 1 hour screen: 145  3 hour glucose: 88/189/181/132  Admission CBC pending  >2 Midnights  INPATIENT     Signature/Title: Ramone Kelly MD  Date: 6/12/2018  Time: 12:25 AM

## 2018-06-12 NOTE — OB LABOR/OXYTOCIN SAFETY PROGRESS
Labor Progress Note - Lorne Landaverde 28 y o  female MRN: [id number]    Unit/Bed#: -01 Encounter: [id number]    Obstetric History       T1      L1     SAB0   TAB0   Ectopic0   Multiple0   Live Births1    Obstetric Comments   Normal delivery 2014 daughter     Gestational Age: 38w3d     Contraction Frequency (minutes): 2  Contraction Quality: Mild  Tachysystole: No   Dilation: 4-5        Effacement (%): 70  Station: -2  Baseline Rate: 120 bpm  Fetal Heart Rate: 150 BPM  FHR Category: Category I          Notes/comments:   Patient comfortable with epidural  Pancho every 2 minutes, Category I FHT  SVE 4-5/70/-2  Continue expectant management of labor  Continue PCN for GBS ppx  Continue accuchecks per protocol    Dr Gordy Paget aware            Eugene Walsh MD 2018 3:12 AM

## 2018-06-12 NOTE — L&D DELIVERY NOTE
DELIVERY NOTE  Jaye Art 28 y o  female MRN: [id number]  Unit/Bed#: -01 Encounter: [id number]    Obstetrician:   Jagdish Adkins    Assistant: Sis Saldana    Pre-Delivery Diagnosis: Term pregnancy  Single fetus  SROM    Post-Delivery Diagnosis: Same as above - Delivered  Viable male fetus  Periurethral tear    Procedure: Spontaneous vaginal delivery    Delivery Date and Time: 2018 at 1229    Estimated Blood Loss:  157GS           Complications:  None    Brief description of labor:  Please see additional notes for details of the labor course  Description of Procedure: With maternal expulsive efforts, the patient delivered a viable male   The position at delivery was LUCILA  The fetal vertex delivered spontaneously  There was a nuchal cord noted, 1 loose loop around the fetal neck easily reduced with gentle traction  The anterior shoulder delivered atraumatically with maternal expulsive forces and the assistance of gentle downward traction  The posterior shoulder delivered with maternal expulsive forces and the assistance of gentle upward traction  The remainder of the fetus delivered spontaneously  Upon delivery, the  was placed on the mother's abdomen and the umbilical cord was clamped and cut  The  resuscitator evaluated the  at bedside  Arterial and venous cord blood gases and cord blood were collected for analysis  These were sent to the lab  Active management of the third stage of labor included uterine massage and administration of IV Pitocin at 250 louis-units per minute  The uterus was noted to contract down well  The placenta delivered spontaneously and was noted to have a centrally-inserted 3-vessel cord  It was sent for storage  The vagina, cervix, perineum, and rectum were inspected and there was noted to be a small periurethral lac that was not bleeding nor distorting anatomy, therefore, not requiring repair      At the conclusion of the delivery, all needle, sponge, and instrument counts were noted to be correct  The patient tolerated the procedure well and was allowed to recover in labor and delivery room  Dr Rachel Koehler MD was present      Saida Church MD  OB/GYN PGY-2  6/12/2018 1:08 PM

## 2018-06-12 NOTE — PROGRESS NOTES
Contraction monitor not tracing accurately  Dr Che Beverly made aware  Will readjust toco when pt is awake or changes in fetal heart strip

## 2018-06-12 NOTE — OB LABOR/OXYTOCIN SAFETY PROGRESS
Labor Progress Note - Lucy Ayala 28 y o  female MRN: [id number]    Unit/Bed#: -01 Encounter: [id number]    Obstetric History       T1      L1     SAB0   TAB0   Ectopic0   Multiple0   Live Births1    Obstetric Comments   Normal delivery 2014 daughter     Gestational Age: 38w3d     Contraction Frequency (minutes): 2-4  Contraction Quality: Moderate  Tachysystole: No   Dilation: 5        Effacement (%): 70  Station: -1  Baseline Rate: 130 bpm  Fetal Heart Rate: 160 BPM  FHR Category: Category I          Notes/comments:   Patient with nausea/vomiting, s/p Zofran, will administer Reglan at this time  Making cervical change, now SVE 5/70/-1  Category I FHT  Continue expectant management of labor  Continue PCN for GBS ppx  Continue accuchecks per protocol    Will inform Dr Marcella Michel MD 2018 5:28 AM

## 2018-06-12 NOTE — PLAN OF CARE
BIRTH - VAGINAL/ SECTION     Fetal and maternal status remain reassuring during the birth process [de-identified]     Emotionally satisfying birthing experience for mother/fetus [address] Discharge to home or other facility with appropriate resources Progressing        INFECTION - ADULT     Absence or prevention of progression during hospitalization Progressing     Absence of fever/infection during neutropenic period Progressing        Knowledge Deficit     Patient/family/caregiver demonstrates understanding of disease process, treatment plan, medications, and discharge instructions Progressing     Verbalizes understanding of labor plan Progressing        Labor & Delivery     Manages discomfort Progressing     Patient vital signs are stable Progressing        PAIN - ADULT     Verbalizes/displays adequate comfort level or baseline comfort level Progressing        SAFETY ADULT     Patient will remain free of falls Progressing     Maintain or return to baseline ADL function Progressing     Maintain or return mobility status to optimal level Progressing

## 2018-06-12 NOTE — ANESTHESIA PREPROCEDURE EVALUATION
Review of Systems/Medical History          Cardiovascular  Negative cardio ROS    Pulmonary  Negative pulmonary ROS        GI/Hepatic  Negative GI/hepatic ROS          Negative  ROS        Endo/Other  Diabetes gestational Insulin,      GYN  Currently pregnant ,          Hematology  Negative hematology ROS      Musculoskeletal  Negative musculoskeletal ROS        Neurology    Headaches,    Psychology   Negative psychology ROS              Physical Exam    Airway    Mallampati score: III  TM Distance: >3 FB  Neck ROM: full     Dental   No notable dental hx     Cardiovascular  Comment: Negative ROS,     Pulmonary      Other Findings        Anesthesia Plan  ASA Score- 2     Anesthesia Type- epidural with ASA Monitors  Additional Monitors:   Airway Plan:         Plan Factors-    Induction-     Postoperative Plan-     Informed Consent- Anesthetic plan and risks discussed with patient

## 2018-06-12 NOTE — DISCHARGE SUMMARY
Discharge Summary - Lucy Ayala 28 y o  female MRN: [id number]    Unit/Bed#: -01 Encounter: [id number]    Admission Date: 2018     Discharge Date: 2018    Admitting Diagnosis: Premature rupture of membranes in pregnancy [O42 90]  38 weeks gestation of pregnancy [Z3A 38]    Discharge Diagnosis: same    Procedures: spontaneous vaginal delivery    Delivering Attending: Carmen Henriquez MD   Discharge Attending: Dr Duarte Oliveros Course:     Lucy Ayala is a 28 y o   who was admitted at 38 wks for SROM  She was started on Penicillin for GBS prophylaxis and progressed to complete  She underwent an uncomplicated spontaneous vaginal delivery  , weight 7 lb 14 oz, APGARS 8 at 1 minute and 9 at 5 minutes, was transferred to  nursery  Patient tolerated the procedure well and was transferred to recovery in stable condition  On day of discharge, she was ambulating and able to reasonably perform all ADLs  She was voiding and had appropriate bowel function  Pain was well controlled  She was discharged home on postpartum day #1 without complications  Patient was instructed to follow up with her OB as an outpatient and was given appropriate warnings to call provider if she develops signs of infection or uncontrolled pain  Complications: None    Condition at discharge: good     Discharge instructions/Information to patient and family:   See after visit summary for information provided to patient and family  Provisions for Follow-Up Care:  See after visit summary for information related to follow-up care and any pertinent home health orders  Disposition: Home    Planned Readmission: No    Discharge Medications: For a complete list of the patient's medications, please refer to her med rec      Felicitas Shultz MD

## 2018-06-12 NOTE — OB LABOR/OXYTOCIN SAFETY PROGRESS
Labor Progress Note - Padmini Caceres 28 y o  female MRN: [id number]    Unit/Bed#: -01 Encounter: [id number]    Obstetric History       T1      L1     SAB0   TAB0   Ectopic0   Multiple0   Live Births1    Obstetric Comments   Normal delivery 2014 daughter     Gestational Age: 38w3d     Contraction Frequency (minutes): 2-4  Contraction Quality: Moderate  Tachysystole: No   Dilation: 9        Effacement (%): 80  Station: -1  Baseline Rate: 135 bpm  Fetal Heart Rate: 135 BPM  FHR Category: Category I          Notes/comments:   Patient is comfortably resting at this time, making cervical change on exam   Will continue to monitor            Echo Flores MD 2018 10:25 AM

## 2018-06-12 NOTE — ANESTHESIA POSTPROCEDURE EVALUATION
Post-Op Assessment Note      CV Status:  Stable    Mental Status:  Alert    Hydration Status:  Stable    PONV Controlled:  None    Airway Patency:  Patent    Post Op Vitals Reviewed: Yes          Staff: Anesthesiologist       Comments: effects of epidural have resolved appropriately; pt satisfied with care    Post-op block assessment: no complications        BP      Temp      Pulse     Resp      SpO2

## 2018-06-12 NOTE — OB LABOR/OXYTOCIN SAFETY PROGRESS
Labor Progress Note - Ning Harknis 28 y o  female MRN: [id number]    Unit/Bed#: -01 Encounter: [id number]    Obstetric History       T1      L1     SAB0   TAB0   Ectopic0   Multiple0   Live Births1    Obstetric Comments   Normal delivery 2014 daughter     Gestational Age: 38w3d     Contraction Frequency (minutes): 2-3  Contraction Quality: Moderate  Tachysystole: No   Dilation: 10  Dilation Complete Date: 18  Dilation Complete Time: 1145  Effacement (%): 100  Station: 2  Baseline Rate: 135 bpm  Fetal Heart Rate: 135 BPM  FHR Category: Category II          Notes/comments:   Patient is currently complete at this time, however, has no urge to push or feeling of continuous pelvic pressure  Will allow to labor down momentarily and reassess clinically            Molly Vallejo MD 2018 11:48 AM

## 2018-06-12 NOTE — PLAN OF CARE
BIRTH - VAGINAL/ SECTION     Fetal and maternal status remain reassuring during the birth process Completed     Emotionally satisfying birthing experience for mother/fetus Completed        Labor & Delivery     Manages discomfort Completed     Patient vital signs are stable Completed          DISCHARGE PLANNING     Discharge to home or other facility with appropriate resources Progressing        INFECTION - ADULT     Absence or prevention of progression during hospitalization Progressing     Absence of fever/infection during neutropenic period Progressing        Knowledge Deficit     Patient/family/caregiver demonstrates understanding of disease process, treatment plan, medications, and discharge instructions Progressing     Verbalizes understanding of labor plan Progressing        PAIN - ADULT     Verbalizes/displays adequate comfort level or baseline comfort level Progressing        Potential for Falls     Patient will remain free of falls Progressing        SAFETY ADULT     Patient will remain free of falls Progressing     Maintain or return to baseline ADL function Progressing     Maintain or return mobility status to optimal level Progressing

## 2018-06-13 VITALS
BODY MASS INDEX: 36.52 KG/M2 | RESPIRATION RATE: 20 BRPM | OXYGEN SATURATION: 98 % | HEIGHT: 60 IN | HEART RATE: 78 BPM | WEIGHT: 186 LBS | DIASTOLIC BLOOD PRESSURE: 86 MMHG | TEMPERATURE: 98.1 F | SYSTOLIC BLOOD PRESSURE: 134 MMHG

## 2018-06-13 PROCEDURE — 99024 POSTOP FOLLOW-UP VISIT: CPT | Performed by: OBSTETRICS & GYNECOLOGY

## 2018-06-13 RX ORDER — ACETAMINOPHEN 325 MG/1
TABLET ORAL
Qty: 30 TABLET | Refills: 0
Start: 2018-06-13 | End: 2018-10-31 | Stop reason: SDUPTHER

## 2018-06-13 RX ORDER — IBUPROFEN 600 MG/1
600 TABLET ORAL EVERY 6 HOURS PRN
Qty: 30 TABLET | Refills: 0
Start: 2018-06-13 | End: 2018-10-31 | Stop reason: SDUPTHER

## 2018-06-13 RX ORDER — DIAPER,BRIEF,INFANT-TODD,DISP
1 EACH MISCELLANEOUS AS NEEDED
Qty: 30 G | Refills: 0
Start: 2018-06-13 | End: 2018-07-25

## 2018-06-13 RX ADMIN — DOCUSATE SODIUM 100 MG: 100 CAPSULE, LIQUID FILLED ORAL at 08:44

## 2018-06-13 RX ADMIN — Medication 1 TABLET: at 08:44

## 2018-06-13 NOTE — PROGRESS NOTES
Progress Note - OB/GYN   [facility] 28 y o  female MRN: [id number]  Unit/Bed#: -01 Encounter: [id number]    Assessment:  28 y o   s/p Spontaneous Vaginal Delivery Postpartum day  1  A2GDM: Will require 2 hour 75 g glucose challenge test 6-12 weeks postpartum  Patient recovering well, Stable  Desires discharge today, 2018    Plan:  Continue routine post partum care  Pain management PRN  Encourage ambulation    Subjective/Objective   Chief Complaint:    Postpartum state    Subjective:   Patient reports she is doing well and is without complaint this morning    Pain: no  Tolerating PO: yes  Voiding: yes  Flatus: yes  BM: yes  Ambulating: yes  Breastfeeding:  no  Chest pain: no  Shortness of breath: no  Leg pain: no  Lochia: minimal    Objective:     Vitals: Temp:  [97 7 °F (36 5 °C)-98 1 °F (36 7 °C)] 97 9 °F (36 6 °C)  HR:  [] 91  Resp:  [18] 18  BP: ()/(50-89) 127/89     Physical Exam:   General: NAD, alert, oriented  Cardio: Regular rate and rhythm, no murmur  Resp: nonlabored breathing, clear to auscultation bilaterally  Abdomen: Soft, no distension/rebound/guarding/tenderness   Fundus: Firm, non-tender, fundus: at umbilicus  G/U: minimal lochia noted on pad  Lower Extremities: Non-tender, no palpable cords    Medications:  Current Facility-Administered Medications   Medication Dose Route Frequency    acetaminophen (TYLENOL) tablet 650 mg  650 mg Oral Q4H PRN    albuterol (PROVENTIL HFA,VENTOLIN HFA) inhaler 2 puff  2 puff Inhalation Q6H PRN    aluminum-magnesium hydroxide-simethicone (MYLANTA) 200-200-20 mg/5 mL oral suspension 15 mL  15 mL Oral Q6H PRN    benzocaine-menthol-lanolin-aloe (DERMOPLAST) 20-0 5 % topical spray 1 application  1 application Topical 4x Daily PRN    calcium carbonate (TUMS) chewable tablet 1,000 mg  1,000 mg Oral Daily PRN    diphenhydrAMINE (BENADRYL) tablet 25 mg  25 mg Oral Q6H PRN    docusate sodium (COLACE) capsule 100 mg  100 mg Oral BID PRN    hydrocortisone 1 % cream 1 application  1 application Topical PRN    ibuprofen (MOTRIN) tablet 600 mg  600 mg Oral Q6H PRN    magnesium hydroxide (MILK OF MAGNESIA) 400 mg/5 mL oral suspension 30 mL  30 mL Oral Daily PRN    metoclopramide (REGLAN) injection 10 mg  10 mg Intravenous Q6H PRN    ondansetron (ZOFRAN) injection 4 mg  4 mg Intravenous Q8H PRN    oxyCODONE-acetaminophen (PERCOCET) 5-325 mg per tablet 1 tablet  1 tablet Oral Q4H PRN    oxyCODONE-acetaminophen (PERCOCET) 5-325 mg per tablet 2 tablet  2 tablet Oral Q4H PRN    prenatal multivitamin tablet 1 tablet  1 tablet Oral Daily    simethicone (MYLICON) chewable tablet 80 mg  80 mg Oral 4x Daily PRN    witch hazel-glycerin (TUCKS) topical pad 1 pad  1 pad Topical PRN       Labs:   Recent Results (from the past 24 hour(s))   Fingerstick Glucose (POCT)    Collection Time: 06/12/18  6:56 AM   Result Value Ref Range    POC Glucose 71 65 - 140 mg/dl   Fingerstick Glucose (POCT)    Collection Time: 06/12/18  9:11 AM   Result Value Ref Range    POC Glucose 68 65 - 140 mg/dl   Fingerstick Glucose (POCT)    Collection Time: 06/12/18 10:53 AM   Result Value Ref Range    POC Glucose 63 (L) 65 - 140 mg/dl   Blood gas, arterial, cord    Collection Time: 06/12/18 12:30 PM   Result Value Ref Range    pH, Cord Art 7 196 (L) 7 230 - 7 430    pCO2, Cord Art 57 4 30 0 - 60 0    pO2, Cord Art 18 5 5 0 - 25 0 mm HG    HCO3, Cord Art 21 7 17 3 - 27 3 mmol/L    Base Exc, Cord Art -7 2 (L) 3 0 - 11 0 mmol/L    O2 Content, Cord Art 7 2 ml/dl    O2 Hgb, Arterial Cord 31 9 %   Blood gas, venous, cord    Collection Time: 06/12/18 12:30 PM   Result Value Ref Range    pH, [place] 7 420 7 190 - 7 490    pCO2, [place] 28 8 27 0 - 43 0 mm HG    pO2, [place] 44 0 15 0 - 45 0 mm HG    HCO3, [place] 18 3 12 2 - 28 6 mmol/L    Base Exc, [place] -4 6 (L) 1 0 - 9 0 mmol/L    O2 Cont, [place] 20 1 mL/dL    O2 HGB,VENOUS CORD 87 7 %         Lissette Barrioss Rema  6/13/2018  5:56 AM

## 2018-06-13 NOTE — PLAN OF CARE
DISCHARGE PLANNING     Discharge to home or other facility with appropriate resources Adequate for Discharge        INFECTION - ADULT     Absence or prevention of progression during hospitalization Adequate for Discharge     Absence of fever/infection during neutropenic period Adequate for Discharge        Knowledge Deficit     Patient/family/caregiver demonstrates understanding of disease process, treatment plan, medications, and discharge instructions Adequate for Discharge     Verbalizes understanding of labor plan Adequate for Discharge        PAIN - ADULT     Verbalizes/displays adequate comfort level or baseline comfort level Adequate for Discharge        POSTPARTUM     Experiences normal postpartum course Adequate for Discharge     Appropriate maternal -  bonding Adequate for Discharge     Establishment of infant feeding pattern Adequate for Discharge     Incision(s), wounds(s) or drain site(s) healing without S/S of infection Adequate for Discharge        Potential for Falls     Patient will remain free of falls Adequate for Discharge        SAFETY ADULT     Patient will remain free of falls Adequate for Discharge     Maintain or return to baseline ADL function Adequate for Discharge     Maintain or return mobility status to optimal level Adequate for Discharge

## 2018-06-13 NOTE — PLAN OF CARE
DISCHARGE PLANNING     Discharge to home or other facility with appropriate resources Progressing        INFECTION - ADULT     Absence or prevention of progression during hospitalization Progressing     Absence of fever/infection during neutropenic period Progressing        Knowledge Deficit     Patient/family/caregiver demonstrates understanding of disease process, treatment plan, medications, and discharge instructions Progressing     Verbalizes understanding of labor plan Progressing        PAIN - ADULT     Verbalizes/displays adequate comfort level or baseline comfort level Progressing        POSTPARTUM     Experiences normal postpartum course Progressing     Appropriate maternal -  bonding Progressing     Establishment of infant feeding pattern Progressing     Incision(s), wounds(s) or drain site(s) healing without S/S of infection Progressing        Potential for Falls     Patient will remain free of falls Progressing        SAFETY ADULT     Patient will remain free of falls Progressing     Maintain or return to baseline ADL function Progressing     Maintain or return mobility status to optimal level Progressing

## 2018-06-13 NOTE — DISCHARGE INSTRUCTIONS
Vaginal Delivery   WHAT YOU SHOULD KNOW:   A vaginal delivery is the birth of your baby through your vagina (birth canal)  AFTER YOU LEAVE:   Medicines:  · NSAIDs  help decrease swelling and pain or fever  This medicine is available with or without a doctor's order  NSAIDs can cause stomach bleeding or kidney problems in certain people  If you take blood thinner medicine, always ask your healthcare provider if NSAIDs are safe for you  Always read the medicine label and follow directions  · Take your medicine as directed  Call your healthcare provider if you think your medicine is not helping or if you have side effects  Tell him if you are allergic to any medicine  Keep a list of the medicines, vitamins, and herbs you take  Include the amounts, and when and why you take them  Bring the list or the pill bottles to follow-up visits  Carry your medicine list with you in case of an emergency  Follow up with your primary healthcare provider:  Most women need to return 6 weeks after a vaginal delivery  Ask about how to care for your wounds or stitches  Write down your questions so you remember to ask them during your visits  Activity:  Rest as much as possible  Try to keep all activities short  You may be able to do some exercise soon after you have your baby  Talk with your primary healthcare provider before you start exercising  If you work outside the home, ask when you can return to your job  Kegel exercises:  Kegel exercises may help your vaginal and rectal muscles heal faster  You can do Kegel exercises by tightening and relaxing the muscles around your vagina  Kegel exercises help make the muscles stronger  Breast care:  When your milk comes in, your breasts may feel full and hard  Ask how to care for your breasts, even if you are not breastfeeding  Constipation:  Do not try to push the bowel movement out if it is too hard   High-fiber foods, extra liquids, and regular exercise can help you prevent constipation  Examples of high-fiber foods are fruit and bran  Prune juice and water are good liquids to drink  Regular exercise helps your digestive system work  You may also be told to take over-the-counter fiber and stool softener medicines  Take these items as directed  Hemorrhoids:  Pregnancy can cause severe hemorrhoids  You may have rectal pain because of the hemorrhoids  Ask how to prevent or treat hemorrhoids  Perineum care: Your perineum is the area between your vagina and anus  Keep the area clean and dry to help it heal and to prevent infection  Wash the area gently with soap and water when you bathe or shower  Rinse your perineum with warm water when you use the toilet  Your primary healthcare provider may suggest you use a warm sitz bath to help decrease pain  A sitz bath is a bathtub or basin filled to hip level  Stay in the sitz bath for 20 to 30 minutes, or as directed  Vaginal discharge: You will have vaginal discharge, called lochia, after your delivery  The lochia is bright red the first day or two after the birth  By the fourth day, the amount decreases, and it turns red-brown  Use a sanitary pad rather than a tampon to prevent a vaginal infection  It is normal to have lochia up to 8 weeks after your baby is born  Monthly periods: Your period may start again within 7 to 12 weeks after your baby is born  If you are breastfeeding, it may take longer for your period to start again  You can still get pregnant again even though you do not have your monthly period  Talk with your primary healthcare provider about a birth control method that will be good for you if you do not want to get pregnant  Mood changes: Many new mothers have some kind of mood changes after delivery  Some of these changes occur because of lack of sleep, hormone changes, and caring for a new baby  Some mood changes can be more serious, such as postpartum depression   Talk with your primary healthcare provider if you feel unable to care for yourself or your baby  Sexual activity:  You may need to avoid sex for 6 to 7 weeks after you have your baby  You may notice you have a decreased desire for sex, or sex may be painful  You may need to use a vaginal lubricant (gel) to help make sex more comfortable  Contact your primary healthcare provider if:   · You have heavy vaginal bleeding that fills 1 or more sanitary pads in 1 hour  · You have a fever  · Your pain does not go away, or gets worse  · The skin between your vagina and rectum is swollen, warm, or red  · You have swollen, hard, or painful breasts  · You feel very sad or depressed  · You feel more tired than usual      · You have questions or concerns about your condition or care  Seek care immediately or call 911 if:   · You have pus or yellow drainage coming from your vagina or wound  · You are urinating very little, or not at all  · Your arm or leg feels warm, tender, and painful  It may look swollen and red  · You feel lightheaded, have sudden and worsening chest pain, or trouble breathing  You may have more pain when you take deep breaths or cough, or you may cough up blood  © 2014 [address] is for End User's use only and may not be sold, redistributed or otherwise used for commercial purposes  All illustrations and images included in CareNotes® are the copyrighted property of [email or web address]  or [address]  The above information is an  only  It is not intended as medical advice for individual conditions or treatments  Talk to your doctor, nurse or pharmacist before following any medical regimen to see if it is safe and effective for you

## 2018-06-19 LAB — PLACENTA IN STORAGE: NORMAL

## 2018-07-25 ENCOUNTER — POSTPARTUM VISIT (OUTPATIENT)
Dept: OBGYN CLINIC | Facility: MEDICAL CENTER | Age: 33
End: 2018-07-25

## 2018-07-25 VITALS — BODY MASS INDEX: 33.32 KG/M2 | WEIGHT: 170.6 LBS | DIASTOLIC BLOOD PRESSURE: 70 MMHG | SYSTOLIC BLOOD PRESSURE: 116 MMHG

## 2018-07-25 DIAGNOSIS — O24.414 INSULIN CONTROLLED GESTATIONAL DIABETES MELLITUS (GDM) IN THIRD TRIMESTER: Primary | ICD-10-CM

## 2018-07-25 PROCEDURE — 99024 POSTOP FOLLOW-UP VISIT: CPT | Performed by: OBSTETRICS & GYNECOLOGY

## 2018-07-31 ENCOUNTER — TELEPHONE (OUTPATIENT)
Dept: OBGYN CLINIC | Facility: CLINIC | Age: 33
End: 2018-07-31

## 2018-07-31 DIAGNOSIS — IMO0001 BIRTH CONTROL: Primary | ICD-10-CM

## 2018-07-31 RX ORDER — MEDROXYPROGESTERONE ACETATE 150 MG/ML
150 INJECTION, SUSPENSION INTRAMUSCULAR
Qty: 1 ML | Refills: 0 | Status: SHIPPED | OUTPATIENT
Start: 2018-07-31 | End: 2019-01-21 | Stop reason: SDUPTHER

## 2018-07-31 NOTE — TELEPHONE ENCOUNTER
Pt called in regards to 1st depo scheduled for tomorrow  We sent rx to [facility] in [place]  They do not have the depo in stock it has to be ordered  Pt has appt tomorrow   Can we sent depo to [facility] in [place] please advise

## 2018-07-31 NOTE — TELEPHONE ENCOUNTER
Pt called in to schedule her 1st depo shot  Pt got her period today  Pt is scheduled 8/1 @ 1140 in [place] please send depo to pharmacy   rx [facility] [place]

## 2018-08-01 ENCOUNTER — OFFICE VISIT (OUTPATIENT)
Dept: OBGYN CLINIC | Facility: MEDICAL CENTER | Age: 33
End: 2018-08-01
Payer: COMMERCIAL

## 2018-08-01 VITALS
SYSTOLIC BLOOD PRESSURE: 110 MMHG | WEIGHT: 171 LBS | DIASTOLIC BLOOD PRESSURE: 74 MMHG | BODY MASS INDEX: 33.57 KG/M2 | HEIGHT: 60 IN

## 2018-08-01 DIAGNOSIS — Z30.42 ENCOUNTER FOR DEPO-PROVERA CONTRACEPTION: Primary | ICD-10-CM

## 2018-08-01 PROCEDURE — 96372 THER/PROPH/DIAG INJ SC/IM: CPT | Performed by: NURSE PRACTITIONER

## 2018-08-01 PROCEDURE — 81025 URINE PREGNANCY TEST: CPT | Performed by: NURSE PRACTITIONER

## 2018-08-01 NOTE — TELEPHONE ENCOUNTER
Pt called in regards to prev note, Pt needs depo sent to [facility],   [facility],   pts appt is 11:40 today in [place],  Thanks,

## 2018-08-01 NOTE — PROGRESS NOTES
Pt scheduled for first depo, menses started 7/31/2018  Postpartum visit done with Dr David Lott 7/25/18  Patient will need yearly done with next injection  Discussed depo with patient, menses started  Depo given Im in right buttock, no complications noted       Lot [id number]  Exp 5/31/2022    Next appt scheduled for yearly and depo in 12 weeks

## 2018-08-02 LAB — SL AMB POCT URINE HCG: NEGATIVE

## 2018-08-02 RX ORDER — MEDROXYPROGESTERONE ACETATE 150 MG/ML
150 INJECTION, SUSPENSION INTRAMUSCULAR ONCE
Status: COMPLETED | OUTPATIENT
Start: 2018-08-02 | End: 2018-10-31

## 2018-08-10 ENCOUNTER — APPOINTMENT (OUTPATIENT)
Dept: LAB | Facility: CLINIC | Age: 33
End: 2018-08-10
Payer: COMMERCIAL

## 2018-08-10 DIAGNOSIS — O24.414 INSULIN CONTROLLED GESTATIONAL DIABETES MELLITUS (GDM) IN THIRD TRIMESTER: ICD-10-CM

## 2018-08-10 LAB
GLUCOSE 2H P 75 G GLC PO SERPL-MCNC: 107 MG/DL (ref 65–139)
GLUCOSE P FAST SERPL-MCNC: 93 MG/DL (ref 65–99)

## 2018-08-10 PROCEDURE — 82950 GLUCOSE TEST: CPT

## 2018-08-10 PROCEDURE — 36415 COLL VENOUS BLD VENIPUNCTURE: CPT

## 2018-08-10 PROCEDURE — 82947 ASSAY GLUCOSE BLOOD QUANT: CPT

## 2018-10-31 ENCOUNTER — ANNUAL EXAM (OUTPATIENT)
Dept: OBGYN CLINIC | Facility: MEDICAL CENTER | Age: 33
End: 2018-10-31
Payer: COMMERCIAL

## 2018-10-31 VITALS
BODY MASS INDEX: 37.11 KG/M2 | WEIGHT: 189 LBS | HEIGHT: 60 IN | SYSTOLIC BLOOD PRESSURE: 116 MMHG | DIASTOLIC BLOOD PRESSURE: 72 MMHG

## 2018-10-31 DIAGNOSIS — Z01.419 ENCOUNTER FOR GYNECOLOGICAL EXAMINATION WITHOUT ABNORMAL FINDING: Primary | ICD-10-CM

## 2018-10-31 PROCEDURE — S0612 ANNUAL GYNECOLOGICAL EXAMINA: HCPCS | Performed by: OBSTETRICS & GYNECOLOGY

## 2018-10-31 PROCEDURE — 96372 THER/PROPH/DIAG INJ SC/IM: CPT | Performed by: NURSE PRACTITIONER

## 2018-10-31 RX ADMIN — MEDROXYPROGESTERONE ACETATE 150 MG: 150 INJECTION, SUSPENSION INTRAMUSCULAR at 13:40

## 2018-10-31 NOTE — PROGRESS NOTES
Assessment/Plan:      Diagnoses and all orders for this visit:    Encounter for gynecological examination without abnormal finding          Subjective:     Patient ID: Sundar Sheppard is a 28 y o  female  Patient is a 54-year-old female, para 2, Depo-provera, here for yearly gynecologic exam    Review of systems is negative for vulvar vaginal or anal irritation  Negative for pelvic or abdominal pain  Negative for breast complaints  Past medical history significant for gestational diabetes  Patient recently passed a 2 hr glucose tolerance test   Past surgical history is significant for wisdom teeth  Current medications are Depo-Provera  Patient has an allergy to erythromycin which causes a rash  Family history is significant for maternal aunt and a maternal grandmother for ovarian cancer  Gynecologic Exam   The patient's pertinent negatives include no pelvic pain  Pertinent negatives include no abdominal pain  Review of Systems   Gastrointestinal: Negative for abdominal pain and rectal pain  Genitourinary: Negative for genital sores, pelvic pain and vaginal pain  Objective:     Physical Exam   Constitutional: She is oriented to person, place, and time  She appears well-developed and well-nourished  HENT:   Head: Normocephalic  Neck: Neck supple  No thyromegaly present  Cardiovascular: Normal rate and regular rhythm  Pulmonary/Chest: Effort normal    Abdominal: Soft  She exhibits no distension and no mass  There is no tenderness  There is no rebound and no guarding  Genitourinary: Vagina normal and uterus normal    Neurological: She is alert and oriented to person, place, and time  Skin: Skin is warm  Psychiatric: She has a normal mood and affect   Her behavior is normal

## 2019-01-21 ENCOUNTER — TELEPHONE (OUTPATIENT)
Dept: OBGYN CLINIC | Facility: MEDICAL CENTER | Age: 34
End: 2019-01-21

## 2019-01-21 DIAGNOSIS — IMO0001 BIRTH CONTROL: Primary | ICD-10-CM

## 2019-01-21 NOTE — TELEPHONE ENCOUNTER
Patient has an appointment for her depo shot on Wednesday, 1/23/19, but the pharmacy doesn't have the prescription

## 2019-01-22 RX ORDER — MEDROXYPROGESTERONE ACETATE 150 MG/ML
150 INJECTION, SUSPENSION INTRAMUSCULAR
Qty: 1 ML | Refills: 1 | Status: SHIPPED | OUTPATIENT
Start: 2019-01-22 | End: 2019-11-06 | Stop reason: ALTCHOICE

## 2019-01-23 ENCOUNTER — OFFICE VISIT (OUTPATIENT)
Dept: OBGYN CLINIC | Facility: MEDICAL CENTER | Age: 34
End: 2019-01-23
Payer: COMMERCIAL

## 2019-01-23 DIAGNOSIS — Z00.00 HEALTH CARE MAINTENANCE: Primary | ICD-10-CM

## 2019-01-23 DIAGNOSIS — Z30.42 ENCOUNTER FOR DEPO-PROVERA CONTRACEPTION: Primary | ICD-10-CM

## 2019-01-23 PROCEDURE — 96372 THER/PROPH/DIAG INJ SC/IM: CPT | Performed by: PHYSICIAN ASSISTANT

## 2019-01-23 RX ORDER — MEDROXYPROGESTERONE ACETATE 150 MG/ML
150 INJECTION, SUSPENSION INTRAMUSCULAR ONCE
Status: COMPLETED | OUTPATIENT
Start: 2019-01-23 | End: 2019-01-23

## 2019-01-23 RX ORDER — MEDROXYPROGESTERONE ACETATE 150 MG/ML
INJECTION, SUSPENSION INTRAMUSCULAR
Qty: 1 SYRINGE | Refills: 1 | Status: SHIPPED | OUTPATIENT
Start: 2019-01-23 | End: 2019-11-06 | Stop reason: ALTCHOICE

## 2019-01-23 RX ADMIN — MEDROXYPROGESTERONE ACETATE 150 MG: 150 INJECTION, SUSPENSION INTRAMUSCULAR at 14:32

## 2019-01-23 NOTE — PROGRESS NOTES
Patient here for depo injection, tolerated injection well  Given on left gluteus    NDC#:1175-9389-80  Lot#: [id number]  Exp: 4/20  Patient advised to make apt for April 10-24

## 2019-04-11 ENCOUNTER — OFFICE VISIT (OUTPATIENT)
Dept: OBGYN CLINIC | Facility: MEDICAL CENTER | Age: 34
End: 2019-04-11
Payer: COMMERCIAL

## 2019-04-11 VITALS
HEIGHT: 60 IN | BODY MASS INDEX: 37.3 KG/M2 | WEIGHT: 190 LBS | DIASTOLIC BLOOD PRESSURE: 70 MMHG | SYSTOLIC BLOOD PRESSURE: 122 MMHG

## 2019-04-11 DIAGNOSIS — Z30.42 ENCOUNTER FOR MANAGEMENT AND INJECTION OF DEPO-PROVERA: Primary | ICD-10-CM

## 2019-04-11 PROCEDURE — 96372 THER/PROPH/DIAG INJ SC/IM: CPT | Performed by: PHYSICIAN ASSISTANT

## 2019-04-11 RX ORDER — MEDROXYPROGESTERONE ACETATE 150 MG/ML
150 INJECTION, SUSPENSION INTRAMUSCULAR ONCE
Status: COMPLETED | OUTPATIENT
Start: 2019-04-11 | End: 2019-04-11

## 2019-04-11 RX ADMIN — MEDROXYPROGESTERONE ACETATE 150 MG: 150 INJECTION, SUSPENSION INTRAMUSCULAR at 13:15

## 2019-11-06 ENCOUNTER — ANNUAL EXAM (OUTPATIENT)
Dept: OBGYN CLINIC | Facility: MEDICAL CENTER | Age: 34
End: 2019-11-06
Payer: COMMERCIAL

## 2019-11-06 VITALS — BODY MASS INDEX: 38.28 KG/M2 | WEIGHT: 196 LBS | SYSTOLIC BLOOD PRESSURE: 122 MMHG | DIASTOLIC BLOOD PRESSURE: 76 MMHG

## 2019-11-06 DIAGNOSIS — R14.0 BLOATING: ICD-10-CM

## 2019-11-06 DIAGNOSIS — Z01.419 ENCOUNTER FOR GYNECOLOGICAL EXAMINATION (GENERAL) (ROUTINE) WITHOUT ABNORMAL FINDINGS: Primary | ICD-10-CM

## 2019-11-06 DIAGNOSIS — Z84.89: ICD-10-CM

## 2019-11-06 PROCEDURE — S0612 ANNUAL GYNECOLOGICAL EXAMINA: HCPCS | Performed by: NURSE PRACTITIONER

## 2019-11-06 NOTE — ASSESSMENT & PLAN NOTE
Normal findings on routine gyn exam  Advised monthly SBE, annual CBE and baseline screening mammo at age 35  Reviewed ASCCP guidelines and recommended pap with cotesting q3 yrs for this low risk patient; this was noted to be up to date  STI testing was offered and the patient declines; she reports low risk  The patient reports vas for contra  Diet/activity recommendations reviewed  RTO in one year or sooner PRN

## 2019-11-06 NOTE — ASSESSMENT & PLAN NOTE
FH notable for maternal aunt with ovarian ca  Reviewed genetic implications  Offered Gyn Onc referral for counseling and genetic testing if indicated - she declines at this time but will consider  Discussed lack of screening guidelines for ov ca and recommended annual pelvic exam, annual pelvic US  Discussed the limitations of these tests  Reviewed sx to report   The patient agrees with plan

## 2019-11-06 NOTE — PROGRESS NOTES
Assessment/Plan:    Family history of benign neoplasm of ovary  FH notable for maternal aunt with ovarian ca  Reviewed genetic implications  Offered Gyn Onc referral for counseling and genetic testing if indicated - she declines at this time but will consider  Discussed lack of screening guidelines for ov ca and recommended annual pelvic exam, annual pelvic US  Discussed the limitations of these tests  Reviewed sx to report  The patient agrees with plan       Bloating  Normal exam  Pelvic US advised  Encounter for gynecological examination (general) (routine) without abnormal findings  Normal findings on routine gyn exam  Advised monthly SBE, annual CBE and baseline screening mammo at age 35  Reviewed ASCCP guidelines and recommended pap with cotesting q3 yrs for this low risk patient; this was noted to be up to date  STI testing was offered and the patient declines; she reports low risk  The patient reports vas for contra  Diet/activity recommendations reviewed  RTO in one year or sooner PRN  Diagnoses and all orders for this visit:    Encounter for gynecological examination (general) (routine) without abnormal findings    Family history of benign neoplasm of ovary  -     US pelvis complete w transvaginal; Future    Bloating  -     US pelvis complete w transvaginal; Future          Subjective:      Patient ID: Carlos Oliver is a 27 y o  female  This patient presents for routine annual gyn exam    Medically stable  She denies acute gyn complaints  Menses are regular and light to mod  She denies pelvic pain, breast concerns, abnormal discharge, bowel/bladder dysfunction, depression/anxiety   and monogamous  She denies STI concerns  Vas for contra; spouse did return for repeat SA      Kids are well - daughter is 11 and son is 21 mos       The following portions of the patient's history were reviewed and updated as appropriate: allergies, current medications, past family history, past medical history, past social history, past surgical history and problem list     Review of Systems   Constitutional: Negative  Respiratory: Negative  Cardiovascular: Negative  Gastrointestinal: Negative  Genitourinary: Negative  Musculoskeletal: Negative  Skin: Negative  Neurological: Negative  Psychiatric/Behavioral: Negative  Objective:      /76   Wt 88 9 kg (196 lb)   LMP 10/27/2019   BMI 38 28 kg/m²          Physical Exam   Constitutional: She is oriented to person, place, and time  She appears well-developed and well-nourished  HENT:   Head: Normocephalic and atraumatic  Eyes: Pupils are equal, round, and reactive to light  EOM are normal    Neck: Normal range of motion  Neck supple  No thyromegaly present  Cardiovascular: Normal rate, regular rhythm and normal heart sounds  Pulmonary/Chest: Effort normal and breath sounds normal  No respiratory distress  She has no wheezes  She has no rales  She exhibits no mass, no tenderness and no deformity  Right breast exhibits no inverted nipple, no mass, no nipple discharge, no skin change and no tenderness  Left breast exhibits no inverted nipple, no mass, no nipple discharge, no skin change and no tenderness  No breast tenderness or discharge  Breasts are symmetrical    Abdominal: Soft  She exhibits no distension and no mass  There is no splenomegaly or hepatomegaly  There is no tenderness  There is no rebound and no guarding  Genitourinary: Rectum normal, vagina normal and uterus normal  No breast tenderness or discharge  No labial fusion  There is no rash, tenderness, lesion or injury on the right labia  There is no rash, tenderness, lesion or injury on the left labia  Cervix exhibits no motion tenderness, no discharge and no friability  Right adnexum displays no mass, no tenderness and no fullness  Left adnexum displays no mass, no tenderness and no fullness  No erythema, tenderness or bleeding in the vagina   No foreign body in the vagina  No vaginal discharge found  Musculoskeletal: Normal range of motion  Lymphadenopathy:     She has no cervical adenopathy  She has no axillary adenopathy  Neurological: She is alert and oriented to person, place, and time  No cranial nerve deficit  Skin: Skin is warm and dry  No rash noted  No cyanosis  Nails show no clubbing  Psychiatric: She has a normal mood and affect   Her speech is normal and behavior is normal  Judgment and thought content normal  Cognition and memory are normal

## 2019-11-07 ENCOUNTER — TELEPHONE (OUTPATIENT)
Dept: SURGICAL ONCOLOGY | Facility: CLINIC | Age: 34
End: 2019-11-07

## 2019-11-07 ENCOUNTER — TELEPHONE (OUTPATIENT)
Dept: OBGYN CLINIC | Facility: CLINIC | Age: 34
End: 2019-11-07

## 2019-11-07 DIAGNOSIS — Z84.89: Primary | ICD-10-CM

## 2019-11-07 NOTE — TELEPHONE ENCOUNTER
I entered the order for referral   If you could please mail it to her that would be great     Thanks

## 2019-11-07 NOTE — TELEPHONE ENCOUNTER
New Patient Encounter    New Patient Intake Form   Patient Details:  Madison Thomas  1985  [id number]    Background Information:  [id number] starts by opening a telephone encounter and gathering the following information   Who is calling to schedule? If not self, relationship to patient? Self    Referring Provider Tee Rodriguez   What is the diagnosis? Genetic testing    When was the diagnosis? Genetic testing    Is patient aware of diagnosis? NA   Reason for visit? Genetic testing    Have you had any testing done? If so: when, where? Genetic testing    Are records in [place]? no   Was the patient told to bring a disk? no   Scheduling Information:   Preferred Guy: [place]     Requesting Specific Provider? no   Are there any dates/time the patient cannot be seen? no   Counseling Pre-Screen:  If the patient answers YES to any of the below questions, please route to the appropriate location specific counselor    Have you felt anxious or worried about cancer and the treatment you are receiving? No   Has your diagnosis caused physical, emotional, or financial hardship for you? No   Note: Do not ask the patient about transportation issues/needs  Please notate if the patient brings it up and the counselor will schedule accordingly  Miscellaneous: na    After completing the above information, please route to Financial Counselor and the appropriate Nurse Navigator for review

## 2019-11-07 NOTE — TELEPHONE ENCOUNTER
Pt sees Pedro Pablo Hernandez mentioned to pt about having genetic screening  But pt not sure if she needs a referral or where to call,  pls call to advise,   thanks

## 2019-11-07 NOTE — TELEPHONE ENCOUNTER
Patient is aware of the referral entered for her  Gave her number for her to call to make an appt and mailed the referral to the pt

## 2019-11-16 ENCOUNTER — HOSPITAL ENCOUNTER (OUTPATIENT)
Dept: RADIOLOGY | Facility: MEDICAL CENTER | Age: 34
Discharge: HOME | End: 2019-11-16
Payer: COMMERCIAL

## 2019-11-16 DIAGNOSIS — Z84.89: ICD-10-CM

## 2019-11-16 DIAGNOSIS — R14.0 BLOATING: ICD-10-CM

## 2019-11-16 PROCEDURE — 76856 US EXAM PELVIC COMPLETE: CPT

## 2019-11-16 PROCEDURE — 76830 TRANSVAGINAL US NON-OB: CPT

## 2019-11-21 ENCOUNTER — TELEPHONE (OUTPATIENT)
Dept: OBGYN CLINIC | Facility: CLINIC | Age: 34
End: 2019-11-21

## 2019-11-21 NOTE — TELEPHONE ENCOUNTER
----- Message from Connor Martínez, [address] sent at 11/21/2019  9:33 AM EST -----  Normal pelvic US   This was ordered for FH ovarian ca   Please notify patient

## 2019-11-21 NOTE — TELEPHONE ENCOUNTER
----- Message from Rachel Flores sent at 11/21/2019  9:33 AM EST -----  Normal pelvic US   This was ordered for FH ovarian ca   Please notify patient

## 2019-12-05 ENCOUNTER — TELEPHONE (OUTPATIENT)
Dept: OBGYN CLINIC | Facility: CLINIC | Age: 34
End: 2019-12-05

## 2019-12-05 NOTE — TELEPHONE ENCOUNTER
Pts  had vasectomy  And recheck  Pt gets migraines when on ocs and never got HA's when on depo  ! Pt off of depo 4 mos  Has had 2 periods   The first one, no HA  This past month, got an awful HA     I told pt to wait another month and see if she gets a ha  If she does next month - will discuss with KK

## 2019-12-19 ENCOUNTER — CONSULT (OUTPATIENT)
Dept: GYNECOLOGIC ONCOLOGY | Facility: CLINIC | Age: 34
End: 2019-12-19

## 2019-12-19 VITALS
HEIGHT: 60 IN | WEIGHT: 199.5 LBS | TEMPERATURE: 98.1 F | HEART RATE: 113 BPM | SYSTOLIC BLOOD PRESSURE: 132 MMHG | DIASTOLIC BLOOD PRESSURE: 86 MMHG | BODY MASS INDEX: 39.17 KG/M2

## 2019-12-19 DIAGNOSIS — Z84.89: ICD-10-CM

## 2019-12-19 DIAGNOSIS — Z80.41 FAMILY HISTORY OF OVARIAN CANCER: Primary | ICD-10-CM

## 2019-12-19 PROCEDURE — NC001 PR NO CHARGE: Performed by: PHYSICIAN ASSISTANT

## 2019-12-19 NOTE — ASSESSMENT & PLAN NOTE
26-year-old with a family history of ovarian cancer in a second degree relative  Her maternal aunt was diagnosed in her 45s  She qualifies for genetic testing per NCCN guidelines  We discussed risk and benefits of testing  She understands the possible outcomes of testing including no pathogenic mutation, a positive pathogenic mutation and VUS  We also discussed prophylactic procedures/screening in the event a genetic mutation identified, as well as implications for patient's family members if mutation identified  Patient agrees to proceed with testing  Venous blood sample collected and sent to [facility]  I will call the patient with results in 2-4 weeks  The patient will undergo formal genetic counseling and appropriate referrals will be made in the event of a positive finding

## 2019-12-19 NOTE — PROGRESS NOTES
Assessment/Plan:    Problem List Items Addressed This Visit        Other    Family history of benign neoplasm of ovary    Family history of ovarian cancer - Primary     39-year-old with a family history of ovarian cancer in a second degree relative  Her maternal aunt was diagnosed in her 45s  She qualifies for genetic testing per NCCN guidelines  We discussed risk and benefits of testing  She understands the possible outcomes of testing including no pathogenic mutation, a positive pathogenic mutation and VUS  We also discussed prophylactic procedures/screening in the event a genetic mutation identified, as well as implications for patient's family members if mutation identified  Patient agrees to proceed with testing  Venous blood sample collected and sent to [facility] will call the patient with results in 2-4 weeks  The patient will undergo formal genetic counseling and appropriate referrals will be made in the event of a positive finding  CHIEF COMPLAINT:   Genetic testing consultation  Problem:  Family history of ovarian cancer  Patient ID: Daniele Edmonds is a 27 y o  female  who presents to the office for genetic testing consultation  She was referred by Shirley Licona  The patient does not have a personal history of cancer  Her maternal aunt was diagnosed with ovarian cancer in her 45s  She denies family history of breast cancer  She is without acute complaints  Review of Systems   Unable to perform ROS: Other       No current outpatient medications on file  No current facility-administered medications for this visit          Allergies   Allergen Reactions    Erythromycin Anaphylaxis and Rash     Reaction Date: [id number];     Pollen Extract Sneezing       Past Medical History:   Diagnosis Date    Bronchitis     Gestational diabetes     gd    Migraines     Last Assessed:11/15/13    Normal delivery     2014 daughter   Julieann Frankel Varicella     childhood  Vulvar dystrophy     Last Assessed:17       Past Surgical History:   Procedure Laterality Date    TOOTH EXTRACTION         OB History        2    Para   2    Term   2       0    AB   0    Living   2       SAB   0    TAB   0    Ectopic   0    Multiple   0    Live Births   2           Obstetric Comments   Normal delivery 2014 daughter               Family History   Problem Relation Age of Onset    Thyroid disease Mother    Arcelia Mullogan Migraines Mother     Hypertension Mother     No Known Problems Father     Asthma Brother     Diabetes Maternal Grandmother     Heart disease Maternal Grandmother        The following portions of the patient's history were reviewed and updated as appropriate: past family history, past medical history and problem list       Objective:    Blood pressure 132/86, pulse (!) 113, temperature 98 1 °F (36 7 °C), temperature source Oral, height 5' (1 524 m), weight 90 5 kg (199 lb 8 oz), not currently breastfeeding  Body mass index is 38 96 kg/m²  Physical Exam   Constitutional: She is oriented to person, place, and time  She appears well-developed and well-nourished  Pulmonary/Chest: Effort normal    Neurological: She is alert and oriented to person, place, and time  Psychiatric: She has a normal mood and affect   Her behavior is normal  Judgment and thought content normal

## 2019-12-30 ENCOUNTER — TELEPHONE (OUTPATIENT)
Dept: GYNECOLOGIC ONCOLOGY | Facility: CLINIC | Age: 34
End: 2019-12-30

## 2020-08-26 ENCOUNTER — OFFICE VISIT (OUTPATIENT)
Dept: URGENT CARE | Facility: MEDICAL CENTER | Age: 35
End: 2020-08-26
Payer: COMMERCIAL

## 2020-08-26 VITALS
BODY MASS INDEX: 38.48 KG/M2 | SYSTOLIC BLOOD PRESSURE: 138 MMHG | OXYGEN SATURATION: 98 % | TEMPERATURE: 98.1 F | HEIGHT: 60 IN | WEIGHT: 196 LBS | DIASTOLIC BLOOD PRESSURE: 78 MMHG | RESPIRATION RATE: 18 BRPM | HEART RATE: 93 BPM

## 2020-08-26 DIAGNOSIS — R21 RASH: ICD-10-CM

## 2020-08-26 DIAGNOSIS — B02.9 HERPES ZOSTER WITHOUT COMPLICATION: Primary | ICD-10-CM

## 2020-08-26 PROCEDURE — 99213 OFFICE O/P EST LOW 20 MIN: CPT | Performed by: PHYSICIAN ASSISTANT

## 2020-08-26 RX ORDER — VALACYCLOVIR HYDROCHLORIDE 1 G/1
1000 TABLET, FILM COATED ORAL 3 TIMES DAILY
Qty: 21 TABLET | Refills: 0 | Status: SHIPPED | OUTPATIENT
Start: 2020-08-26 | End: 2022-07-30

## 2020-08-27 NOTE — PROGRESS NOTES
[address] [facility]        NAME: Anders Araujo is a 38 y o  female  : 1985    MRN: [id number]  DATE: 2020  TIME: 8:42 PM    Assessment and Plan   Herpes zoster without complication [L81 3]  1  Herpes zoster without complication  valACYclovir (VALTREX) 1,000 mg tablet   2  Rash           Patient Instructions     Take all antivirals as prescribed  Call PCP today to schedule a follow-up appointment in the next 1-2 days for re-evaluation of the rash  Keep rash clean and try to reduce the risk of bacterial superinfection  Keep it covered but avoid topical antibiotics or dressings with adhesion that may cause irritation or delay healing of the rash  Be careful/try to avoid immunocompromised people such as pregnant women or children without vaccines  Go to the ER immediately if any fevers, pain, redness, drainage, numbness, tingling, headaches, fatigue, spread of rash, or other concerning, new or worsening symptoms    Chief Complaint     Chief Complaint   Patient presents with    Herpes Zoster     SHINGLES: HAS HAD A FEW TIMES IN PAST         History of Present Illness       69-year-old female presents with shingles to right side of back since today  States she was getting out of the shower and she noticed some mild discomfort when drying off to the right side of her back  States she had her  look and he noticed the rash  States she has a history of shingles, states it feels like her previous shingles  She states the antivirals have always helps her in the past and came for the medication  She denies any other new foods, medications, laundry detergents, soaps, injury/trauma to the area, bug bites or other inciting factors  Denies any fevers or cough/cold symptoms  Denies any other rashes or spread of the rash but states is only on the right side of her back    Denies any lip/tongue/facial swelling, difficulty breathing or swallowing, facial rashes, chest pain, shortness of breath, numbness, tingling, weakness, headaches, fatigue, dizziness, GI/ symptoms or other complaints  She denies any past medical history  Denies any pregnancy risk  Review of Systems   Review of Systems   Constitutional: Negative for activity change, appetite change, chills, fatigue and fever  HENT: Negative for congestion, ear discharge, ear pain, facial swelling, hearing loss, sore throat, trouble swallowing and voice change  Eyes: Negative  Negative for pain, discharge, itching and visual disturbance  Respiratory: Negative for cough and shortness of breath  Cardiovascular: Negative for chest pain  Gastrointestinal: Negative for abdominal pain, diarrhea, nausea and vomiting  Genitourinary: Negative  Musculoskeletal: Negative for arthralgias, joint swelling, myalgias, neck pain and neck stiffness  Skin: Positive for rash  Neurological: Negative for dizziness, weakness, numbness and headaches  All other systems reviewed and are negative          Current Medications       Current Outpatient Medications:     valACYclovir (VALTREX) 1,000 mg tablet, Take 1 tablet (1,000 mg total) by mouth 3 (three) times a day for 7 days, Disp: 21 tablet, Rfl: 0    Current Allergies     Allergies as of 08/26/2020 - Reviewed 08/26/2020   Allergen Reaction Noted    Erythromycin Anaphylaxis and Rash 04/22/2012    Pollen extract Sneezing 11/15/2013            The following portions of the patient's history were reviewed and updated as appropriate: allergies, current medications, past family history, past medical history, past social history, past surgical history and problem list      Past Medical History:   Diagnosis Date    Bronchitis     Gestational diabetes     gd    Migraines     Last Assessed:11/15/13    Normal delivery     2014 daughter    Varicella     childhood    Vulvar dystrophy     Last Assessed:9/19/17       Past Surgical History:   Procedure Laterality Date    TOOTH EXTRACTION Family History   Problem Relation Age of Onset    Thyroid disease Mother    [facility] Migraines Mother     Hypertension Mother     No Known Problems Father     Asthma Brother     Diabetes Maternal Grandmother     Heart disease Maternal Grandmother          Medications have been verified  Objective   /78   Pulse 93   Temp 98 1 °F (36 7 °C) (Temporal)   Ht 5' (1 524 m)   Wt 88 9 kg (196 lb)   SpO2 98%   BMI 38 28 kg/m²        Physical Exam     Physical Exam  Vitals signs and nursing note reviewed  Constitutional:       General: She is not in acute distress  Appearance: She is well-developed  HENT:      Head: Normocephalic and atraumatic  Right Ear: Hearing, tympanic membrane, ear canal and external ear normal       Left Ear: Hearing, tympanic membrane, ear canal and external ear normal       Mouth/Throat:      Mouth: No angioedema  Pharynx: Oropharynx is clear  Uvula midline  No uvula swelling  Comments: No lip/tongue/sublingual or facial swelling or airway obstruction  No facial rashes visualized  Eyes:      Pupils: Pupils are equal, round, and reactive to light  Cardiovascular:      Rate and Rhythm: Normal rate and regular rhythm  Heart sounds: Normal heart sounds  Pulmonary:      Effort: Pulmonary effort is normal  No respiratory distress  Breath sounds: Normal breath sounds  No wheezing  Skin:     Capillary Refill: Capillary refill takes less than 2 seconds  Findings: Rash present  Neurological:      Mental Status: She is alert and oriented to person, place, and time     Psychiatric:         Behavior: Behavior normal

## 2020-08-27 NOTE — PATIENT INSTRUCTIONS
Take all antivirals as prescribed  Call PCP today to schedule a follow-up appointment in the next 1-2 days for re-evaluation of the rash  Keep rash clean and try to reduce the risk of bacterial superinfection  Keep it covered but avoid topical antibiotics or dressings with adhesion that may cause irritation or delay healing of the rash  Be careful/try to avoid immunocompromised people such as pregnant women or children without vaccines  Go to the ER immediately if any fevers, pain, redness, drainage, numbness, tingling, headaches, fatigue, spread of rash, or other concerning, new or worsening symptoms    Shingles   AMBULATORY CARE:   Shingles  is a painful rash  Shingles is caused by the same virus that causes chickenpox (varicella-zoster virus)  After you get chickenpox, the virus stays in your body for several years without causing any symptoms  Shingles occurs when the virus becomes active again  Once active, the virus will travel along a nerve to your skin and cause a rash  Common signs and symptoms include the following:  Shingles often starts with pain in the back, chest, neck, or face  A rash then develops in the same area  The rash is usually found on only one side of the body  The rash may feel itchy or painful  It starts as red dots that become blisters filled with fluid  The blisters usually grow bigger, become filled with pus, and then crust over after a few days  You may also have any of the following:  · Fatigue and muscle weakness    · Pain when your skin is lightly touched    · Headache    · Fever    · Eye pain when exposed to light  Seek care immediately if:   · You have painful, red, warm skin around the blisters, or the blisters drain pus  · Your neck is stiff or you have trouble moving it  · You have trouble moving your arms, legs, or face  · You have a seizure  · You have weakness in an arm or leg  · You become confused, or have difficulty speaking      · You have dizziness, a severe headache, or hearing or vision loss  Contact your healthcare provider if:   · You feel weak or have a headache  · You have a cough, chills, or a fever  · You have abdominal pain or nausea, or you are vomiting  · Your rash becomes more itchy or painful  · Your rash spreads to other parts of your body  · Your pain worsens and does not go away even after you take medicine  · You have questions or concerns about your condition or care  Medicines:   · Antiviral medicine  helps decrease symptoms and healing time  They may also decrease your risk of developing nerve pain  You will need to start taking them within 3 days of the start of symptoms to prevent nerve pain  · Pain medicine  may be prescribed or suggested by your healthcare provider  You may need NSAIDs, acetaminophen, or opioid medicine depending on how much pain you are in  Do not wait until the pain is severe before you take more pain medicine  · Topical anesthetics  are used to numb the skin and decrease pain  They can be a cream, gel, spray, or patch  · Anticonvulsants  decrease nerve pain and may help you sleep at night  · Antidepressants  may be used to decrease nerve pain  Follow up with your healthcare provider as directed:  Write down your questions so you remember to ask them during your visits  Self-care:  Keep your rash clean and dry  Cover your rash with a bandage or clothing  Do not use bandages that stick to your skin  The sticky part may irritate your skin and make your rash last longer  Prevent the spread of shingles: The virus can be passed to a person who has never had chickenpox  This person may get chickenpox, but not shingles  You may pass the virus to others as long as you have a rash  The virus is spread by direct contact with the fluid from the blisters  Usually, you cannot spread the virus once the blisters dry up     Prevent shingles or another shingles outbreak:  A vaccine may be given to help prevent shingles  Ask for more information about this vaccine  © 2017 [address] Information is for End User's use only and may not be sold, redistributed or otherwise used for commercial purposes  All illustrations and images included in CareNotes® are the copyrighted property of A D A M , Inc  or Sarkis Carrillo  The above information is an  only  It is not intended as medical advice for individual conditions or treatments  Talk to your doctor, nurse or pharmacist before following any medical regimen to see if it is safe and effective for you

## 2020-08-31 NOTE — ANESTHESIA PROCEDURE NOTES
Epidural Block    Patient location during procedure: OB  Start time: 6/12/2018 2:01 AM  Reason for block: procedure for pain, at surgeon's request and primary anesthetic  Staffing  Anesthesiologist: DALE JENKINS  Performed: anesthesiologist   Preanesthetic Checklist  Completed: patient identified, site marked, surgical consent, pre-op evaluation, timeout performed, IV checked, risks and benefits discussed and monitors and equipment checked  Epidural  Patient position: sitting  Prep: Betadine  Patient monitoring: heart rate, cardiac monitor, continuous pulse ox and frequent blood pressure checks  Approach: midline  Location: lumbar (1-5)  Injection technique: MARILYNN air  Needle  Needle type: Tuohy   Needle gauge: 18 G  Test dose: negative and lidocaine 2%  Assessment  Sensory level: [id number] aspiration for CSF, negative aspiration for heme and no paresthesia on injection  patient tolerated the procedure well with no immediate complications  Additional Notes  One attempt, L3-4, MARILYNN at 6 cm, taped to skin at 11 cm H Plasty Text: Given the location of the defect, shape of the defect and the proximity to free margins a H-plasty was deemed most appropriate for repair.  Using a sterile surgical marker, the appropriate advancement arms of the H-plasty were drawn incorporating the defect and placing the expected incisions within the relaxed skin tension lines where possible. The area thus outlined was incised deep to adipose tissue with a #15 scalpel blade. The skin margins were undermined to an appropriate distance in all directions utilizing iris scissors.  The opposing advancement arms were then advanced into place in opposite direction and anchored with interrupted buried subcutaneous sutures.

## 2020-11-11 ENCOUNTER — ANNUAL EXAM (OUTPATIENT)
Dept: OBGYN CLINIC | Facility: MEDICAL CENTER | Age: 35
End: 2020-11-11
Payer: COMMERCIAL

## 2020-11-11 VITALS
WEIGHT: 190 LBS | TEMPERATURE: 97.3 F | DIASTOLIC BLOOD PRESSURE: 80 MMHG | BODY MASS INDEX: 37.11 KG/M2 | SYSTOLIC BLOOD PRESSURE: 140 MMHG

## 2020-11-11 DIAGNOSIS — R14.0 BLOATING: ICD-10-CM

## 2020-11-11 DIAGNOSIS — Z84.89: ICD-10-CM

## 2020-11-11 DIAGNOSIS — Z01.419 ENCOUNTER FOR GYNECOLOGICAL EXAMINATION (GENERAL) (ROUTINE) WITHOUT ABNORMAL FINDINGS: Primary | ICD-10-CM

## 2020-11-11 PROCEDURE — S0612 ANNUAL GYNECOLOGICAL EXAMINA: HCPCS | Performed by: NURSE PRACTITIONER

## 2021-08-18 ENCOUNTER — OFFICE VISIT (OUTPATIENT)
Dept: URGENT CARE | Facility: MEDICAL CENTER | Age: 36
End: 2021-08-18
Payer: COMMERCIAL

## 2021-08-18 VITALS
TEMPERATURE: 97.7 F | RESPIRATION RATE: 18 BRPM | HEART RATE: 120 BPM | BODY MASS INDEX: 33.18 KG/M2 | WEIGHT: 169 LBS | HEIGHT: 60 IN | OXYGEN SATURATION: 100 %

## 2021-08-18 DIAGNOSIS — J02.9 ACUTE VIRAL PHARYNGITIS: Primary | ICD-10-CM

## 2021-08-18 LAB — S PYO AG THROAT QL: NEGATIVE

## 2021-08-18 PROCEDURE — 87880 STREP A ASSAY W/OPTIC: CPT

## 2021-08-18 PROCEDURE — 99213 OFFICE O/P EST LOW 20 MIN: CPT | Performed by: PHYSICIAN ASSISTANT

## 2021-08-18 PROCEDURE — 87070 CULTURE OTHR SPECIMN AEROBIC: CPT | Performed by: PHYSICIAN ASSISTANT

## 2021-08-18 NOTE — PROGRESS NOTES
[address] [facility]        NAME: Yonas Miller is a 39 y o  female  : 1985    MRN: [id number]  DATE: 2021  TIME: 7:55 PM    Assessment and Plan   Acute viral pharyngitis [J02 9]  1  Acute viral pharyngitis  POCT rapid strepA    Throat culture         Patient Instructions     Pharyngitis  Salt water gargles  Follow up with PCP in 3-5 days  Proceed to  ER if symptoms worsen  Chief Complaint     Chief Complaint   Patient presents with    Sore Throat     Started monday with throat pain         History of Present Illness       38 y/o female presents c/o sore throat and pain on swallowing  Denies fever, chills      Review of Systems   Review of Systems   Constitutional: Negative  HENT: Positive for sore throat  Eyes: Negative  Respiratory: Negative  Negative for cough, chest tightness, shortness of breath, wheezing and stridor  Cardiovascular: Negative  Negative for chest pain, palpitations and leg swelling           Current Medications       Current Outpatient Medications:     valACYclovir (VALTREX) 1,000 mg tablet, Take 1 tablet (1,000 mg total) by mouth 3 (three) times a day for 7 days, Disp: 21 tablet, Rfl: 0    Current Allergies     Allergies as of 2021 - Reviewed 2021   Allergen Reaction Noted    Erythromycin Anaphylaxis and Rash 2012    Pollen extract Sneezing 11/15/2013            The following portions of the patient's history were reviewed and updated as appropriate: allergies, current medications, past family history, past medical history, past social history, past surgical history and problem list      Past Medical History:   Diagnosis Date    Bronchitis     Gestational diabetes     gd    Migraines     Last Assessed:11/15/13    Normal delivery     2014 daughter    Varicella     childhood    Vulvar dystrophy     Last Assessed:17       Past Surgical History:   Procedure Laterality Date    TOOTH EXTRACTION         Family History   Problem Relation Age of Onset    Thyroid disease Mother    Reece Shen Migraines Mother     Hypertension Mother     No Known Problems Father     Asthma Brother     Diabetes Maternal Grandmother     Heart disease Maternal Grandmother          Medications have been verified  Objective   Pulse (!) 120   Temp 97 7 °F (36 5 °C)   Resp 18   Ht 5' (1 524 m)   Wt 76 7 kg (169 lb)   LMP 08/06/2021   SpO2 100%   BMI 33 01 kg/m²        Physical Exam     Physical Exam  Constitutional:       General: She is not in acute distress  Appearance: She is well-developed  She is not diaphoretic  HENT:      Head: Normocephalic and atraumatic  Jaw: No trismus  Right Ear: Hearing, tympanic membrane, ear canal and external ear normal       Left Ear: Hearing, tympanic membrane, ear canal and external ear normal       Mouth/Throat:      Pharynx: Uvula midline  Posterior oropharyngeal erythema present  No oropharyngeal exudate or uvula swelling  Tonsils: No tonsillar abscesses  Cardiovascular:      Rate and Rhythm: Normal rate and regular rhythm  Heart sounds: Normal heart sounds  Pulmonary:      Effort: Pulmonary effort is normal       Breath sounds: Normal breath sounds  Musculoskeletal:      Cervical back: Normal range of motion and neck supple  Lymphadenopathy:      Cervical: Cervical adenopathy present

## 2021-08-18 NOTE — PATIENT INSTRUCTIONS
Pharyngitis  Salt water gargles  Declined covid test  Follow up with PCP in 3-5 days  Proceed to  ER if symptoms worsen  Pharyngitis   WHAT YOU NEED TO KNOW:   Pharyngitis, or sore throat, is inflammation of the tissues and structures in your pharynx (throat)  Pharyngitis is most often caused by bacteria  It may also be caused by a cold or flu virus  Other causes include smoking, allergies, or acid reflux  DISCHARGE INSTRUCTIONS:   Call 911 for any of the following:   · You have trouble breathing or swallowing because your throat is swollen or sore  Return to the emergency department if:   · You are drooling because it hurts too much to swallow  · Your fever is higher than 102? F (39?C) or lasts longer than 3 days  · You are confused  · You taste blood in your throat  Contact your healthcare provider if:   · Your throat pain gets worse  · You have a painful lump in your throat that does not go away after 5 days  · Your symptoms do not improve after 5 days  · You have questions or concerns about your condition or care  Medicines:  Viral pharyngitis will go away on its own without treatment  Your sore throat should start to feel better in 3 to 5 days for both viral and bacterial infections  You may need any of the following:  · Antibiotics  treat a bacterial infection  · NSAIDs , such as ibuprofen, help decrease swelling, pain, and fever  NSAIDs can cause stomach bleeding or kidney problems in certain people  If you take blood thinner medicine, always ask your healthcare provider if NSAIDs are safe for you  Always read the medicine label and follow directions  · Acetaminophen  decreases pain and fever  It is available without a doctor's order  Ask how much to take and how often to take it  Follow directions  Acetaminophen can cause liver damage if not taken correctly  · Take your medicine as directed    Contact your healthcare provider if you think your medicine is not helping or if you have side effects  Tell him or her if you are allergic to any medicine  Keep a list of the medicines, vitamins, and herbs you take  Include the amounts, and when and why you take them  Bring the list or the pill bottles to follow-up visits  Carry your medicine list with you in case of an emergency  Manage your symptoms:   · Gargle salt water  Mix ¼ teaspoon salt in an 8 ounce glass of warm water and gargle  This may help decrease swelling in your throat  · Drink liquids as directed  You may need to drink more liquids than usual  Liquids may help soothe your throat and prevent dehydration  Ask how much liquid to drink each day and which liquids are best for you  · Use a cool-steam humidifier  to help moisten the air in your room and calm your cough  · Soothe your throat  with cough drops, ice, soft foods, or popsicles  Prevent the spread of pharyngitis:  Cover your mouth and nose when you cough or sneeze  Do not share food or drinks  Wash your hands often  Use soap and water  If soap and water are unavailable, use an alcohol based hand   Follow up with your healthcare provider as directed:  Write down your questions so you remember to ask them during your visits  © Copyright [employer] 2021 Information is for End User's use only and may not be sold, redistributed or otherwise used for commercial purposes  All illustrations and images included in CareNotes® are the copyrighted property of [employer] M [employer]  or Ronn Martinez  The above information is an  only  It is not intended as medical advice for individual conditions or treatments  Talk to your doctor, nurse or pharmacist before following any medical regimen to see if it is safe and effective for you

## 2021-08-20 LAB — BACTERIA THROAT CULT: NORMAL

## 2021-11-15 ENCOUNTER — ANNUAL EXAM (OUTPATIENT)
Dept: OBGYN CLINIC | Facility: MEDICAL CENTER | Age: 36
End: 2021-11-15
Payer: COMMERCIAL

## 2021-11-15 VITALS
DIASTOLIC BLOOD PRESSURE: 80 MMHG | SYSTOLIC BLOOD PRESSURE: 118 MMHG | HEIGHT: 60 IN | BODY MASS INDEX: 31.57 KG/M2 | WEIGHT: 160.8 LBS

## 2021-11-15 DIAGNOSIS — Z01.419 ENCOUNTER FOR GYNECOLOGICAL EXAMINATION (GENERAL) (ROUTINE) WITHOUT ABNORMAL FINDINGS: Primary | ICD-10-CM

## 2021-11-15 PROCEDURE — S0612 ANNUAL GYNECOLOGICAL EXAMINA: HCPCS | Performed by: STUDENT IN AN ORGANIZED HEALTH CARE EDUCATION/TRAINING PROGRAM

## 2021-11-15 RX ORDER — SUMATRIPTAN 50 MG/1
TABLET, FILM COATED ORAL
COMMUNITY
Start: 2021-09-21 | End: 2022-07-30

## 2022-01-06 ENCOUNTER — APPOINTMENT (OUTPATIENT)
Dept: RADIOLOGY | Facility: MEDICAL CENTER | Age: 37
End: 2022-01-06
Payer: COMMERCIAL

## 2022-01-06 DIAGNOSIS — R05.9 COUGH: ICD-10-CM

## 2022-01-06 PROCEDURE — 71046 X-RAY EXAM CHEST 2 VIEWS: CPT

## 2022-07-16 ENCOUNTER — APPOINTMENT (OUTPATIENT)
Dept: LAB | Facility: MEDICAL CENTER | Age: 37
End: 2022-07-16
Payer: COMMERCIAL

## 2022-07-16 DIAGNOSIS — I10 ESSENTIAL HYPERTENSION, BENIGN: ICD-10-CM

## 2022-07-16 LAB
ALBUMIN SERPL BCP-MCNC: 3.9 G/DL (ref 3.5–5)
ALP SERPL-CCNC: 65 U/L (ref 46–116)
ALT SERPL W P-5'-P-CCNC: 22 U/L (ref 12–78)
ANION GAP SERPL CALCULATED.3IONS-SCNC: 5 MMOL/L (ref 4–13)
AST SERPL W P-5'-P-CCNC: 14 U/L (ref 5–45)
BILIRUB SERPL-MCNC: 0.69 MG/DL (ref 0.2–1)
BUN SERPL-MCNC: 11 MG/DL (ref 5–25)
CALCIUM SERPL-MCNC: 9.5 MG/DL (ref 8.3–10.1)
CHLORIDE SERPL-SCNC: 107 MMOL/L (ref 100–108)
CO2 SERPL-SCNC: 28 MMOL/L (ref 21–32)
CREAT SERPL-MCNC: 0.85 MG/DL (ref 0.6–1.3)
GFR SERPL CREATININE-BSD FRML MDRD: 87 ML/MIN/1.73SQ M
GLUCOSE P FAST SERPL-MCNC: 90 MG/DL (ref 65–99)
POTASSIUM SERPL-SCNC: 4.2 MMOL/L (ref 3.5–5.3)
PROT SERPL-MCNC: 7.3 G/DL (ref 6.4–8.2)
SODIUM SERPL-SCNC: 140 MMOL/L (ref 136–145)

## 2022-07-16 PROCEDURE — 80053 COMPREHEN METABOLIC PANEL: CPT

## 2022-07-30 ENCOUNTER — HOSPITAL ENCOUNTER (EMERGENCY)
Facility: HOSPITAL | Age: 37
Discharge: HOME | End: 2022-07-31
Attending: EMERGENCY MEDICINE
Payer: COMMERCIAL

## 2022-07-30 ENCOUNTER — APPOINTMENT (EMERGENCY)
Dept: RADIOLOGY | Facility: HOSPITAL | Age: 37
End: 2022-07-30
Payer: COMMERCIAL

## 2022-07-30 ENCOUNTER — OFFICE VISIT (OUTPATIENT)
Dept: URGENT CARE | Facility: MEDICAL CENTER | Age: 37
End: 2022-07-30
Payer: COMMERCIAL

## 2022-07-30 VITALS
RESPIRATION RATE: 18 BRPM | TEMPERATURE: 98 F | BODY MASS INDEX: 30.63 KG/M2 | HEART RATE: 110 BPM | WEIGHT: 156 LBS | OXYGEN SATURATION: 100 % | DIASTOLIC BLOOD PRESSURE: 73 MMHG | SYSTOLIC BLOOD PRESSURE: 139 MMHG | HEIGHT: 60 IN

## 2022-07-30 DIAGNOSIS — R07.89 ATYPICAL CHEST PAIN: Primary | ICD-10-CM

## 2022-07-30 DIAGNOSIS — R51.9 ACUTE NONINTRACTABLE HEADACHE, UNSPECIFIED HEADACHE TYPE: Primary | ICD-10-CM

## 2022-07-30 DIAGNOSIS — R20.2 PARESTHESIAS: ICD-10-CM

## 2022-07-30 LAB
ALBUMIN SERPL BCP-MCNC: 4.3 G/DL (ref 3.5–5)
ALP SERPL-CCNC: 54 U/L (ref 34–104)
ALT SERPL W P-5'-P-CCNC: 13 U/L (ref 7–52)
ANION GAP SERPL CALCULATED.3IONS-SCNC: 7 MMOL/L (ref 4–13)
APTT PPP: 29 SECONDS (ref 23–37)
AST SERPL W P-5'-P-CCNC: 15 U/L (ref 13–39)
BASOPHILS # BLD AUTO: 0.11 THOUSANDS/ΜL (ref 0–0.1)
BASOPHILS NFR BLD AUTO: 1 % (ref 0–1)
BILIRUB SERPL-MCNC: 0.56 MG/DL (ref 0.2–1)
BUN SERPL-MCNC: 15 MG/DL (ref 5–25)
CALCIUM SERPL-MCNC: 9.3 MG/DL (ref 8.4–10.2)
CARDIAC TROPONIN I PNL SERPL HS: <2 NG/L
CHLORIDE SERPL-SCNC: 106 MMOL/L (ref 96–108)
CO2 SERPL-SCNC: 25 MMOL/L (ref 21–32)
CREAT SERPL-MCNC: 0.76 MG/DL (ref 0.6–1.3)
EOSINOPHIL # BLD AUTO: 0.42 THOUSAND/ΜL (ref 0–0.61)
EOSINOPHIL NFR BLD AUTO: 4 % (ref 0–6)
ERYTHROCYTE [DISTWIDTH] IN BLOOD BY AUTOMATED COUNT: 12 % (ref 11.6–15.1)
GFR SERPL CREATININE-BSD FRML MDRD: 99 ML/MIN/1.73SQ M
GLUCOSE SERPL-MCNC: 100 MG/DL (ref 65–140)
HCT VFR BLD AUTO: 42.4 % (ref 34.8–46.1)
HGB BLD-MCNC: 14.5 G/DL (ref 11.5–15.4)
IMM GRANULOCYTES # BLD AUTO: 0.06 THOUSAND/UL (ref 0–0.2)
IMM GRANULOCYTES NFR BLD AUTO: 1 % (ref 0–2)
INR PPP: 0.87 (ref 0.84–1.19)
LYMPHOCYTES # BLD AUTO: 2.94 THOUSANDS/ΜL (ref 0.6–4.47)
LYMPHOCYTES NFR BLD AUTO: 25 % (ref 14–44)
MAGNESIUM SERPL-MCNC: 2.1 MG/DL (ref 1.9–2.7)
MCH RBC QN AUTO: 30.8 PG (ref 26.8–34.3)
MCHC RBC AUTO-ENTMCNC: 34.2 G/DL (ref 31.4–37.4)
MCV RBC AUTO: 90 FL (ref 82–98)
MONOCYTES # BLD AUTO: 0.8 THOUSAND/ΜL (ref 0.17–1.22)
MONOCYTES NFR BLD AUTO: 7 % (ref 4–12)
NEUTROPHILS # BLD AUTO: 7.69 THOUSANDS/ΜL (ref 1.85–7.62)
NEUTS SEG NFR BLD AUTO: 62 % (ref 43–75)
NRBC BLD AUTO-RTO: 0 /100 WBCS
PLATELET # BLD AUTO: 343 THOUSANDS/UL (ref 149–390)
PMV BLD AUTO: 9.2 FL (ref 8.9–12.7)
POTASSIUM SERPL-SCNC: 3.9 MMOL/L (ref 3.5–5.3)
PROT SERPL-MCNC: 6.9 G/DL (ref 6.4–8.4)
PROTHROMBIN TIME: 12.1 SECONDS (ref 11.6–14.5)
RBC # BLD AUTO: 4.71 MILLION/UL (ref 3.81–5.12)
SODIUM SERPL-SCNC: 138 MMOL/L (ref 135–147)
TSH SERPL DL<=0.05 MIU/L-ACNC: 1.75 UIU/ML (ref 0.45–4.5)
WBC # BLD AUTO: 12.02 THOUSAND/UL (ref 4.31–10.16)

## 2022-07-30 PROCEDURE — 99285 EMERGENCY DEPT VISIT HI MDM: CPT

## 2022-07-30 PROCEDURE — 99213 OFFICE O/P EST LOW 20 MIN: CPT | Performed by: PHYSICIAN ASSISTANT

## 2022-07-30 PROCEDURE — 71045 X-RAY EXAM CHEST 1 VIEW: CPT

## 2022-07-30 PROCEDURE — 83735 ASSAY OF MAGNESIUM: CPT

## 2022-07-30 PROCEDURE — 85730 THROMBOPLASTIN TIME PARTIAL: CPT | Performed by: EMERGENCY MEDICINE

## 2022-07-30 PROCEDURE — 85025 COMPLETE CBC W/AUTO DIFF WBC: CPT | Performed by: EMERGENCY MEDICINE

## 2022-07-30 PROCEDURE — 84484 ASSAY OF TROPONIN QUANT: CPT | Performed by: EMERGENCY MEDICINE

## 2022-07-30 PROCEDURE — 80053 COMPREHEN METABOLIC PANEL: CPT | Performed by: EMERGENCY MEDICINE

## 2022-07-30 PROCEDURE — 84443 ASSAY THYROID STIM HORMONE: CPT

## 2022-07-30 PROCEDURE — 81025 URINE PREGNANCY TEST: CPT

## 2022-07-30 PROCEDURE — 93005 ELECTROCARDIOGRAM TRACING: CPT

## 2022-07-30 PROCEDURE — 36415 COLL VENOUS BLD VENIPUNCTURE: CPT

## 2022-07-30 PROCEDURE — 85610 PROTHROMBIN TIME: CPT | Performed by: EMERGENCY MEDICINE

## 2022-07-30 RX ORDER — LORAZEPAM 0.5 MG/1
0.5 TABLET ORAL 2 TIMES DAILY PRN
COMMUNITY
Start: 2022-07-20

## 2022-07-30 RX ORDER — LISINOPRIL 5 MG/1
5 TABLET ORAL DAILY
COMMUNITY
Start: 2022-07-15

## 2022-07-30 NOTE — PROGRESS NOTES
[address] [facility]        NAME: Doc Ormond is a 44 y o  female  : 1985    MRN: [id number]  DATE: 2022  TIME: 5:46 PM    Assessment and Plan   Acute nonintractable headache, unspecified headache type [R51 9]  1  Acute nonintractable headache, unspecified headache type           Patient Instructions     Headache  Follow up with PCP in 3-5 days  Proceed to  ER if symptoms worsen  Chief Complaint     Chief Complaint   Patient presents with    Migraine     For 2 days, 1/10 pain  Pt reports it being a "tension" headache  History of Present Illness       42-year-old female who presents complaining of headaches on and off x3 days  Patient states that she suffers from anxiety and recently has been stressing out but wanted to come in and have a neurological exam   Patient denies on steady gait, visual disturbances, dizziness      Review of Systems   Review of Systems   Constitutional: Negative  HENT: Negative  Eyes: Negative  Respiratory: Negative  Negative for cough, chest tightness, shortness of breath, wheezing and stridor  Cardiovascular: Negative  Negative for chest pain, palpitations and leg swelling  Neurological: Positive for headaches           Current Medications       Current Outpatient Medications:     lisinopril (ZESTRIL) 5 mg tablet, Take 5 mg by mouth daily, Disp: , Rfl:     LORazepam (ATIVAN) 0 5 mg tablet, Take 0 5 mg by mouth 2 (two) times a day as needed, Disp: , Rfl:     SUMAtriptan (IMITREX) 50 mg tablet, As needed  (Patient not taking: Reported on 2022), Disp: , Rfl:     valACYclovir (VALTREX) 1,000 mg tablet, Take 1 tablet (1,000 mg total) by mouth 3 (three) times a day for 7 days, Disp: 21 tablet, Rfl: 0    Current Allergies     Allergies as of 2022 - Reviewed 2022   Allergen Reaction Noted    Erythromycin Anaphylaxis and Rash 2012    Pollen extract Sneezing 11/15/2013            The following portions of the patient's history were reviewed and updated as appropriate: allergies, current medications, past family history, past medical history, past social history, past surgical history and problem list      Past Medical History:   Diagnosis Date    Bronchitis     Gestational diabetes     gd    Migraines     Last Assessed:11/15/13    Normal delivery     2014 daughter    Varicella     childhood    Vulvar dystrophy     Last Assessed:9/19/17       Past Surgical History:   Procedure Laterality Date    TOOTH EXTRACTION         Family History   Problem Relation Age of Onset    Thyroid disease Mother    Josefina Riis Migraines Mother     Hypertension Mother     No Known Problems Father     Asthma Brother     Diabetes Maternal Grandmother     Heart disease Maternal Grandmother     Ovarian cancer Maternal Aunt     Breast cancer Neg Hx     Colon cancer Neg Hx     Uterine cancer Neg Hx     Cervical cancer Neg Hx          Medications have been verified  Objective   /73   Pulse (!) 110   Temp 98 °F (36 7 °C)   Resp 18   Ht 5' (1 524 m)   Wt 70 8 kg (156 lb)   SpO2 100%   BMI 30 47 kg/m²        Physical Exam     Physical Exam  Constitutional:       Appearance: Normal appearance  She is well-developed  HENT:      Head: Normocephalic and atraumatic  Right Ear: Tympanic membrane, ear canal and external ear normal  There is no impacted cerumen  Left Ear: Tympanic membrane, ear canal and external ear normal  There is no impacted cerumen  Nose: Nose normal       Mouth/Throat:      Pharynx: No oropharyngeal exudate  Cardiovascular:      Rate and Rhythm: Normal rate and regular rhythm  Heart sounds: Normal heart sounds  Pulmonary:      Effort: Pulmonary effort is normal  No respiratory distress  Breath sounds: Normal breath sounds  No wheezing or rales  Chest:      Chest wall: No tenderness  Abdominal:      Palpations: There is no mass     Musculoskeletal:      Cervical back: Normal range of motion and neck supple  Lymphadenopathy:      Cervical: No cervical adenopathy  Neurological:      General: No focal deficit present  Mental Status: She is alert  GCS: GCS eye subscore is 4  GCS verbal subscore is 5  GCS motor subscore is 6  Cranial Nerves: Cranial nerves are intact  Sensory: Sensation is intact  Motor: Motor function is intact  Coordination: Coordination is intact  Gait: Gait is intact

## 2022-07-30 NOTE — PATIENT INSTRUCTIONS
Headache  Follow up with PCP in 3-5 days  Proceed to  ER if symptoms worsenAcute Headache   WHAT YOU NEED TO KNOW:   An acute headache is pain or discomfort that may start suddenly and get worse quickly  You may have an acute headache only when you feel stress or eat certain foods  Other acute headache pain can happen every day, and sometimes several times a day  DISCHARGE INSTRUCTIONS:   Return to the emergency department if:   You have severe pain  You have numbness or weakness on one side of your face or body  You have a headache that occurs after a blow to the head, a fall, or other trauma  You have a headache, are forgetful or confused, or have trouble speaking  You have a headache, stiff neck, and a fever  Call your doctor if:   You have a constant headache and are vomiting  You have a headache each day that does not get better, even after treatment  You have changes in your headaches, or new symptoms that occur when you have a headache  You have questions or concerns about your condition or care  Medicines: You may need any of the following:  Prescription pain medicine  may be given  The medicine your healthcare provider recommends will depend on the kind of headaches you have  You will need to take prescription headache medicines as directed to prevent a problem called rebound headache  These headaches happen with regular use of pain relievers for headache disorders  NSAIDs , such as ibuprofen, help decrease swelling, pain, and fever  This medicine is available with or without a doctor's order  NSAIDs can cause stomach bleeding or kidney problems in certain people  If you take blood thinner medicine, always ask your healthcare provider if NSAIDs are safe for you  Always read the medicine label and follow directions  Acetaminophen  decreases pain and fever  It is available without a doctor's order  Ask how much to take and how often to take it  Follow directions   Read the labels of all other medicines you are using to see if they also contain acetaminophen, or ask your doctor or pharmacist  Acetaminophen can cause liver damage if not taken correctly  Do not use more than 3 grams (3,000 milligrams) total of acetaminophen in one day  Antidepressants  may be given for some kinds of headaches  Take your medicine as directed  Contact your healthcare provider if you think your medicine is not helping or if you have side effects  Tell him or her if you are allergic to any medicine  Keep a list of the medicines, vitamins, and herbs you take  Include the amounts, and when and why you take them  Bring the list or the pill bottles to follow-up visits  Carry your medicine list with you in case of an emergency  Manage your symptoms:   Apply heat or ice  on the headache area  Use a heat or ice pack  For an ice pack, you can also put crushed ice in a plastic bag  Cover the pack or bag with a towel before you apply it to your skin  Ice and heat both help decrease pain, and heat also helps decrease muscle spasms  Apply heat for 20 to 30 minutes every 2 hours  Apply ice for 15 to 20 minutes every hour  Apply heat or ice for as long and for as many days as directed  You may alternate heat and ice  Relax your muscles  Lie down in a comfortable position and close your eyes  Relax your muscles slowly  Start at your toes and work your way up your body  Keep a record of your headaches  Write down when your headaches start and stop  Include your symptoms and what you were doing when the headache began  Record what you ate or drank for 24 hours before the headache started  Describe the pain and where it hurts  Keep track of what you did to treat your headache and if it worked  Prevent an acute headache:   Avoid anything that triggers an acute headache  Examples include exposure to chemicals, going to high altitude, or not getting enough sleep  Create a regular sleep routine   Go to sleep at the same time and wake up at the same time each day  Do not use electronic devices before bedtime  These may trigger a headache or prevent you from sleeping well  Do not smoke  Nicotine and other chemicals in cigarettes and cigars can trigger an acute headache or make it worse  Ask your healthcare provider for information if you currently smoke and need help to quit  E-cigarettes or smokeless tobacco still contain nicotine  Talk to your healthcare provider before you use these products  Limit alcohol as directed  Alcohol can trigger an acute headache or make it worse  If you have cluster headaches, do not drink alcohol during an episode  For other types of headaches, ask your healthcare provider if it is safe for you to drink alcohol  Ask how much is safe for you to drink, and how often  Exercise as directed  Exercise can reduce tension and help with headache pain  Aim for 30 minutes of physical activity on most days of the week  Your healthcare provider can help you create an exercise plan  Eat a variety of healthy foods  Healthy foods include fruits, vegetables, low-fat dairy products, lean meats, fish, whole grains, and cooked beans  Your healthcare provider or dietitian can help you create meals plans if you need to avoid foods that trigger headaches  Follow up with your healthcare provider as directed:  Bring your headache record with you when you see your healthcare provider  Write down your questions so you remember to ask them during your visits  © Copyright [employer] 2022 Information is for End User's use only and may not be sold, redistributed or otherwise used for commercial purposes  All illustrations and images included in CareNotes® are the copyrighted property of [employer] Inc  or Ronn Campos   The above information is an  only  It is not intended as medical advice for individual conditions or treatments   Talk to your doctor, nurse or pharmacist before following any medical regimen to see if it is safe and effective for you

## 2022-07-31 VITALS
HEART RATE: 84 BPM | WEIGHT: 155 LBS | RESPIRATION RATE: 20 BRPM | SYSTOLIC BLOOD PRESSURE: 122 MMHG | HEIGHT: 60 IN | OXYGEN SATURATION: 100 % | BODY MASS INDEX: 30.43 KG/M2 | TEMPERATURE: 98 F | DIASTOLIC BLOOD PRESSURE: 70 MMHG

## 2022-07-31 LAB
CARDIAC TROPONIN I PNL SERPL HS: <2 NG/L
D DIMER PPP FEU-MCNC: 0.38 UG/ML FEU
EXT PREG TEST URINE: NEGATIVE
EXT. CONTROL ED NAV: NORMAL

## 2022-07-31 PROCEDURE — 99285 EMERGENCY DEPT VISIT HI MDM: CPT | Performed by: EMERGENCY MEDICINE

## 2022-07-31 PROCEDURE — 85379 FIBRIN DEGRADATION QUANT: CPT

## 2022-07-31 PROCEDURE — 36415 COLL VENOUS BLD VENIPUNCTURE: CPT

## 2022-07-31 NOTE — ED PROVIDER NOTES
History  Chief Complaint   Patient presents with    Chest Pain     Patient reports having chest tightness and tingling starting tonight @ 9m       58-year-old female past medical history of anxiety presents with left sided paresthesias in her chest that have been occurring intermittently over the past month  Patient reports she had an episode earlier tonight that was persisting longer than usual which caused her to come in for evaluation  Patient states she has a lot of stress in her life recently and states she is very certain that her symptoms are due to anxiety but she wanted to come in for reassurance  Patient also reporting some intermittent left-sided chest tightness associated with the paresthesias but denies any shortness of breath, nausea, vomiting, fevers, recent illness or any other symptoms at this time  Chest Pain  Associated symptoms: numbness    Associated symptoms: no abdominal pain, no back pain, no cough, no fever, no palpitations, no shortness of breath and not vomiting        Prior to Admission Medications   Prescriptions Last Dose Informant Patient Reported? Taking?    LORazepam (ATIVAN) 0 5 mg tablet   Yes Yes   Sig: Take 0 5 mg by mouth 2 (two) times a day as needed   lisinopril (ZESTRIL) 5 mg tablet   Yes Yes   Sig: Take 5 mg by mouth daily      Facility-Administered Medications: None       Past Medical History:   Diagnosis Date    Bronchitis     Gestational diabetes     gd    Migraines     Last Assessed:11/15/13    Normal delivery     2014 daughter    Varicella     childhood    Vulvar dystrophy     Last Assessed:9/19/17       Past Surgical History:   Procedure Laterality Date    TOOTH EXTRACTION         Family History   Problem Relation Age of Onset    Thyroid disease Mother    Jamar Hernandez Migraines Mother     Hypertension Mother     No Known Problems Father     Asthma Brother     Diabetes Maternal Grandmother     Heart disease Maternal Grandmother     Ovarian cancer Maternal Aunt     Breast cancer Neg Hx     Colon cancer Neg Hx     Uterine cancer Neg Hx     Cervical cancer Neg Hx      I have reviewed and agree with the history as documented  E-Cigarette/Vaping    E-Cigarette Use Never User      E-Cigarette/Vaping Substances     Social History     Tobacco Use    Smoking status: Former Smoker     Quit date: 2013     Years since quittin 5    Smokeless tobacco: Never Used   Vaping Use    Vaping Use: Never used   Substance Use Topics    Alcohol use: No    Drug use: No        Review of Systems   Constitutional: Negative for chills and fever  HENT: Negative for ear pain and sore throat  Eyes: Negative for pain and visual disturbance  Respiratory: Negative for cough and shortness of breath  Cardiovascular: Positive for chest pain  Negative for palpitations  Gastrointestinal: Negative for abdominal pain and vomiting  Genitourinary: Negative for dysuria and hematuria  Musculoskeletal: Negative for arthralgias and back pain  Skin: Negative for color change and rash  Neurological: Positive for numbness  Negative for seizures and syncope  All other systems reviewed and are negative  Physical Exam  ED Triage Vitals [22]   Temperature Pulse Respirations Blood Pressure SpO2   98 °F (36 7 °C) (!) 120 20 135/70 100 %      Temp Source Heart Rate Source Patient Position - Orthostatic VS BP Location FiO2 (%)   Oral Monitor Sitting Right arm --      Pain Score       3             Orthostatic Vital Signs  Vitals:    22 2200 22 2300 22 0015 22 0130   BP:  131/78  122/70   Pulse: 98 96 (!) 106 84   Patient Position - Orthostatic VS:  Sitting  Sitting       Physical Exam  Vitals and nursing note reviewed  Constitutional:       General: She is not in acute distress  Appearance: She is well-developed  She is not ill-appearing, toxic-appearing or diaphoretic  HENT:      Head: Normocephalic and atraumatic     Eyes: Conjunctiva/sclera: Conjunctivae normal    Cardiovascular:      Rate and Rhythm: Regular rhythm  Tachycardia present  Heart sounds: No murmur heard  Pulmonary:      Effort: Pulmonary effort is normal  No respiratory distress  Breath sounds: Normal breath sounds  Chest:      Chest wall: No tenderness (No reproducible chest wall tenderness)  Abdominal:      Palpations: Abdomen is soft  Tenderness: There is no abdominal tenderness  Musculoskeletal:      Cervical back: Neck supple  Right lower leg: No tenderness  No edema  Left lower leg: No tenderness  No edema  Skin:     General: Skin is warm and dry  Capillary Refill: Capillary refill takes less than 2 seconds  Neurological:      General: No focal deficit present  Mental Status: She is alert  Sensory: Sensation is intact  No sensory deficit  Motor: No weakness  ED Medications  Medications - No data to display    Diagnostic Studies  Results Reviewed     Procedure Component Value Units Date/Time    D-Dimer [[id number]]  (Normal) Collected: 07/31/22 0149    Lab Status: Final result Specimen: Blood from Arm, Left Updated: 07/31/22 0212     D-Dimer, Quant 0 38 ug/ml FEU     HS Troponin I 2hr [[id number]] Collected: 07/30/22 2356    Lab Status: Final result Specimen: Blood from Arm, Right Updated: 07/31/22 0034     hs TnI 2hr <2 ng/L      Delta 2hr hsTnI --    POCT pregnancy, urine [[id number]]  (Normal) Resulted: 07/31/22 0009    Lab Status: Final result Updated: 07/31/22 0009     EXT PREG TEST UR (Ref: Negative) negative     Control valid    TSH [[id number]]  (Normal) Collected: 07/30/22 2229    Lab Status: Final result Specimen: Blood from Arm, Left Updated: 07/30/22 2314     TSH 3RD GENERATON 1 751 uIU/mL     Narrative:      Patients undergoing fluorescein dye angiography may retain small amounts of fluorescein in the body for 48-72 hours post procedure   Samples containing fluorescein can produce falsely depressed TSH values  If the patient had this procedure,a specimen should be resubmitted post fluorescein clearance        Comprehensive metabolic panel [[id number]] Collected: 07/30/22 2229    Lab Status: Final result Specimen: Blood from Arm, Left Updated: 07/30/22 2258     Sodium 138 mmol/L      Potassium 3 9 mmol/L      Chloride 106 mmol/L      CO2 25 mmol/L      ANION GAP 7 mmol/L      BUN 15 mg/dL      Creatinine 0 76 mg/dL      Glucose 100 mg/dL      Calcium 9 3 mg/dL      AST 15 U/L      ALT 13 U/L      Alkaline Phosphatase 54 U/L      Total Protein 6 9 g/dL      Albumin 4 3 g/dL      Total Bilirubin 0 56 mg/dL      eGFR 99 ml/min/1 73sq m     Narrative:      [place] guidelines for Chronic Kidney Disease (CKD):     Stage 1 with normal or high GFR (GFR > 90 mL/min/1 73 square meters)    Stage 2 Mild CKD (GFR = 60-89 mL/min/1 73 square meters)    Stage 3A Moderate CKD (GFR = 45-59 mL/min/1 73 square meters)    Stage 3B Moderate CKD (GFR = 30-44 mL/min/1 73 square meters)    Stage 4 Severe CKD (GFR = 15-29 mL/min/1 73 square meters)    Stage 5 End Stage CKD (GFR <15 mL/min/1 73 square meters)  Note: GFR calculation is accurate only with a steady state creatinine    Magnesium [[id number]]  (Normal) Collected: 07/30/22 2229    Lab Status: Final result Specimen: Blood from Arm, Left Updated: 07/30/22 2257     Magnesium 2 1 mg/dL     HS Troponin 0hr (reflex protocol) [[id number]]  (Normal) Collected: 07/30/22 2120    Lab Status: Final result Specimen: Blood from Arm, Left Updated: 07/30/22 2212     hs TnI 0hr <2 ng/L     Protime-INR [[id number]]  (Normal) Collected: 07/30/22 2120    Lab Status: Final result Specimen: Blood from Arm, Left Updated: 07/30/22 2205     Protime 12 1 seconds      INR 0 87    APTT [[id number]]  (Normal) Collected: 07/30/22 2120    Lab Status: Final result Specimen: Blood from Arm, Left Updated: 07/30/22 2205     PTT 29 seconds     CBC and differential [[id number]] (Abnormal) Collected: 07/30/22 2120    Lab Status: Final result Specimen: Blood from Arm, Left Updated: 07/30/22 2141     WBC 12 02 Thousand/uL      RBC 4 71 Million/uL      Hemoglobin 14 5 g/dL      Hematocrit 42 4 %      MCV 90 fL      MCH 30 8 pg      MCHC 34 2 g/dL      RDW 12 0 %      MPV 9 2 fL      Platelets 383 Thousands/uL      nRBC 0 /100 WBCs      Neutrophils Relative 62 %      Immat GRANS % 1 %      Lymphocytes Relative 25 %      Monocytes Relative 7 %      Eosinophils Relative 4 %      Basophils Relative 1 %      Neutrophils Absolute 7 69 Thousands/µL      Immature Grans Absolute 0 06 Thousand/uL      Lymphocytes Absolute 2 94 Thousands/µL      Monocytes Absolute 0 80 Thousand/µL      Eosinophils Absolute 0 42 Thousand/µL      Basophils Absolute 0 11 Thousands/µL                  XR chest 1 view portable    (Results Pending)         Procedures  ECG 12 Lead Documentation Only    Date/Time: 7/31/2022 7:50 AM  Performed by: Kesha Ridley MD  Authorized by: Kesha Ridley MD     Indications / Diagnosis:  Chest tightness  ECG reviewed by me, the ED Provider: yes    Patient location:  ED  Previous ECG:     Previous ECG:  Compared to current    Similarity:  No change  Interpretation:     Interpretation: normal    Rate:     ECG rate:  115    ECG rate assessment: tachycardic    Rhythm:     Rhythm: sinus rhythm    Ectopy:     Ectopy: none    QRS:     QRS axis:  Normal    QRS intervals:  Normal  Conduction:     Conduction: normal    ST segments:     ST segments:  Normal  T waves:     T waves: normal    Comments:      Sinus tachycardia  No ST T wave abnormalities  ED Course                             SBIRT 20yo+    Flowsheet Row Most Recent Value   SBIRT (23 yo +)    In order to provide better care to our patients, we are screening all of our patients for alcohol and drug use  Would it be okay to ask you these screening questions?  Unable to answer at this time Filed at: 07/30/2022 6553 MDM  Number of Diagnoses or Management Options  Atypical chest pain  Paresthesias  Diagnosis management comments: 57-year-old female past medical history of anxiety presents with left sided paresthesias in her chest that have been occurring intermittently over the past month  Patient believes symptoms are secondary to her anxiety and she has recently had such stress and anxiety in her personal life  Workup unremarkable  CXR negative  Patient reports feeling well at time of discharge  Tachycardia has resolved without intervention  Reviewed reasons to return  Patient stable for discharge home  Amount and/or Complexity of Data Reviewed  Clinical lab tests: ordered and reviewed  Tests in the radiology section of CPT®: ordered and reviewed  Independent visualization of images, tracings, or specimens: yes    Risk of Complications, Morbidity, and/or Mortality  Presenting problems: moderate  Diagnostic procedures: moderate  Management options: low    Patient Progress  Patient progress: stable      Disposition  Final diagnoses:   Atypical chest pain   Paresthesias     Time reflects when diagnosis was documented in both MDM as applicable and the Disposition within this note     Time User Action Codes Description Comment    7/31/2022  2:15 AM Claudia Nichole [R07 89] Atypical chest pain     7/31/2022  2:15 AM Claudia Nichole [R20 2] Paresthesias       ED Disposition     ED Disposition   Discharge    Condition   Stable    Date/Time   Sun Jul 31, 2022  2:15 AM    Comment   Con Hari Serfass discharge to home/self care  Follow-up Information     Follow up With Specialties Details Why [address], DO Family Medicine Schedule an appointment as soon as possible for a visit   [id number]   [facility] [id number] [place] [address] Emergency Department Emergency Medicine  As needed, If symptoms worsen [address] [place] [address] [facility] [address] [address] [facility] Emergency Department, [address], [facility] [place] [address]          Discharge Medication List as of 7/31/2022  2:16 AM      CONTINUE these medications which have NOT CHANGED    Details   lisinopril (ZESTRIL) 5 mg tablet Take 5 mg by mouth daily, Starting Fri 7/15/2022, Historical Med      LORazepam (ATIVAN) 0 5 mg tablet Take 0 5 mg by mouth 2 (two) times a day as needed, Starting Wed 7/20/2022, Historical Med           No discharge procedures on file  PDMP Review     None           ED Provider  Attending physically available and evaluated Sundar Sheppard I managed the patient along with the ED Attending      Electronically Signed by         Kesha Ridley MD  07/31/22 5747

## 2022-07-31 NOTE — ED ATTENDING ATTESTATION
7/30/2022  IAngelique MD, saw and evaluated the patient  I have discussed the patient with the resident/non-physician practitioner and agree with the resident's/non-physician practitioner's findings, Plan of Care, and MDM as documented in the resident's/non-physician practitioner's note, except where noted  All available labs and Radiology studies were reviewed  I was present for key portions of any procedure(s) performed by the resident/non-physician practitioner and I was immediately available to provide assistance  At this point I agree with the current assessment done in the Emergency Department  I have conducted an independent evaluation of this patient a history and physical is as follows:    ED Course         Critical Care Time  Procedures      Patient is a pleasant 44 yof who presents with paresthesias in the chest      Recently started anxiety medications  Patient suspects this is just her anxiety but just wanted to get check out  No LE edema  Feeling well currently  MDM will check cardiac and PE, otherwise will dc

## 2022-08-01 LAB
ATRIAL RATE: 120 BPM
P AXIS: 63 DEGREES
PR INTERVAL: 160 MS
QRS AXIS: 49 DEGREES
QRSD INTERVAL: 66 MS
QT INTERVAL: 298 MS
QTC INTERVAL: 413 MS
T WAVE AXIS: 52 DEGREES
VENTRICULAR RATE: 115 BPM

## 2022-08-01 PROCEDURE — 93010 ELECTROCARDIOGRAM REPORT: CPT | Performed by: INTERNAL MEDICINE

## 2022-08-11 ENCOUNTER — CONSULT (OUTPATIENT)
Dept: VASCULAR SURGERY | Facility: CLINIC | Age: 37
End: 2022-08-11
Payer: COMMERCIAL

## 2022-08-11 VITALS
DIASTOLIC BLOOD PRESSURE: 70 MMHG | SYSTOLIC BLOOD PRESSURE: 126 MMHG | WEIGHT: 156 LBS | HEIGHT: 60 IN | BODY MASS INDEX: 30.63 KG/M2 | HEART RATE: 100 BPM

## 2022-08-11 DIAGNOSIS — I83.92 ASYMPTOMATIC VARICOSE VEIN OF LEFT LOWER EXTREMITY WITHOUT COMPLICATION: Primary | ICD-10-CM

## 2022-08-11 PROCEDURE — 99203 OFFICE O/P NEW LOW 30 MIN: CPT | Performed by: NURSE PRACTITIONER

## 2022-08-11 NOTE — ASSESSMENT & PLAN NOTE
50-year-old female with migraines, presents to the office for evaluation of left posterior calf varicosity   -Noticed left posterior calf bulging veins few weeks ago   -Strong family history of varicose veins   -Essentially she is asymptomatic from a venous standpoint   -Recommended conservative measures which includes continued regular exercise  -Recommended Rx 20-30 mmHg compression socks   -Follow up in the office if develops significant symptoms of throbbing, aching, pain related to varicosity

## 2022-08-11 NOTE — PATIENT INSTRUCTIONS
Varicose Veins   AMBULATORY CARE:   Varicose veins  are veins that become large, twisted, and swollen  They are common on the back of the calves, knees, and thighs  Varicose veins are caused by valves in your veins that do not work properly  This causes blood to collect and increase pressure in the veins of your legs  The increased pressure causes your veins to stretch, get larger, swell, and twist        Common symptoms include the following: Your symptoms may be worse after you stand or sit for long periods of time  You may have any of the following:  Blue, purple, or bulging veins in your legs     Pain, swelling, or muscle cramps in your legs    Feeling of fatigue or heaviness in your legs    Cramping in your legs    Seek care immediately if:   You have a wound that does not heal or is infected  You have an injury that has broken your skin and caused your varicose veins to bleed  Your leg is swollen and hard  You notice that your legs or feet are turning blue or black  Your leg feels warm, tender, and painful  It may look swollen and red  Contact your healthcare provider if:   You have pain in your leg that does not go away or gets worse  You notice sudden large bruising on your legs  You have a rash on your leg  Your symptoms keep you from doing your daily activities  You have questions or concerns about your condition or care  Treatment of varicose veins  aims to decrease symptoms, improve appearance, and prevent further problems  Treatment will depend on which veins are affected and how severe your condition is  You may need procedures to treat or remove your varicose veins  For example, your healthcare provider may inject a solution or use a laser to close the varicose veins  Surgery to remove long veins may also be done  Ask your healthcare provider for more information about procedures used to treat varicose veins    Manage varicose veins:   Do not sit or stand for long periods of time  This can cause the blood to collect in your legs and make your symptoms worse  Bend or rotate your ankles several times every hour  Walk around for a few minutes every hour to get blood moving in your legs  Do not cross your legs when you sit  This decreases blood flow to your feet and can make your symptoms worse  Do not wear tight clothing or shoes  Do not wear high-heeled shoes  Do not wear clothes that are tight around the waist or knees  Maintain a healthy weight  Being overweight or obese can make your varicose veins worse  Ask your healthcare provider how much you should weigh  Ask him or her to help you create a weight loss plan if you are overweight  Wear pressure stockings as directed  The stockings are tight and put pressure on your legs  They improve blood flow and help prevent clots  Elevate your legs  Keep them above the level of your heart for 15 to 30 minutes several times a day  You can also prop the end of your bed up slightly to elevate your legs while you sleep  This will help blood to flow back to your heart  Get regular exercise  Talk to your healthcare provider about the best exercise plan for you  Exercise can improve blood flow to your legs and feet  Follow up with your doctor as directed:  Write down your questions so you remember to ask them during your visits  © Copyright [employer] 2022 Information is for End User's use only and may not be sold, redistributed or otherwise used for commercial purposes  All illustrations and images included in CareNotes® are the copyrighted property of [employer]  or Ronn [employer]   The above information is an  only  It is not intended as medical advice for individual conditions or treatments  Talk to your doctor, nurse or pharmacist before following any medical regimen to see if it is safe and effective for you

## 2022-08-11 NOTE — PROGRESS NOTES
Assessment/Plan:    Asymptomatic varicose vein of left lower extremity without complication  11-[facility] female with migraines, presents to the office for evaluation of left posterior calf varicosity   -Noticed left posterior calf bulging veins few weeks ago   -Strong family history of varicose veins   -Essentially she is asymptomatic from a venous standpoint   -Recommended conservative measures which includes continued regular exercise  -Recommended Rx 20-30 mmHg compression socks   -Follow up in the office if develops significant symptoms of throbbing, aching, pain related to varicosity       Diagnoses and all orders for this visit:    Asymptomatic varicose vein of left lower extremity without complication  -     Compression Stocking    Other orders  -     sertraline (ZOLOFT) 50 mg tablet; Take by mouth          Subjective:      Patient ID: Orlando Harrington is a 44 y o  female  Pt is new and presents today LLE bulging Varicose Veins  Pt reports occasional tingling  Pt denies wearing compression  Pt denies having previous treatment  HPI  67-year-old female with migraines, presents to the office for evaluation of left posterior calf varicosity  She noticed the left calf varicosity approximately 6 weeks ago while gardening  She has significant family history of varicose veins and was very concerned with the varicosity  She has no lower extremity swelling  No symptoms of heaviness, throbbing, aching discomfort  No venous stasis changes  No history of DVT/SVT  She exercises regularly walking 2+ miles daily  She works part-time sitting at [employer]  She has 2 children 6and 3years old  No plans for future pregnancies    The following portions of the patient's history were reviewed and updated as appropriate: allergies, current medications, past family history, past medical history, past social history, past surgical history and problem list   Review of systems reviewed    Review of Systems Constitutional: Negative  HENT: Negative  Eyes: Negative  Respiratory: Negative  Cardiovascular: Negative  Gastrointestinal: Negative  Endocrine: Negative  Genitourinary: Negative  Musculoskeletal: Negative  Skin: Negative  Allergic/Immunologic: Negative  Neurological: Negative  Hematological: Negative  Psychiatric/Behavioral: The patient is nervous/anxious  Objective:  I have reviewed and made appropriate changes to the review of systems input by the medical assistant      Vitals:    22 0902   BP: 126/70   BP Location: Left arm   Patient Position: Sitting   Cuff Size: Standard   Pulse: 100   Weight: 70 8 kg (156 lb)   Height: 5' (1 524 m)       Patient Active Problem List   Diagnosis    Migraines    Encounter for gynecological examination (general) (routine) without abnormal findings    Family history of benign neoplasm of ovary    Bloating    Family history of ovarian cancer    Asymptomatic varicose vein of left lower extremity without complication       Past Surgical History:   Procedure Laterality Date    TOOTH EXTRACTION         Family History   Problem Relation Age of Onset    Thyroid disease Mother    [facility] Migraines Mother     Hypertension Mother     No Known Problems Father     Asthma Brother     Diabetes Maternal Grandmother     Heart disease Maternal Grandmother     Ovarian cancer Maternal Aunt     Breast cancer Neg Hx     Colon cancer Neg Hx     Uterine cancer Neg Hx     Cervical cancer Neg Hx        Social History     Socioeconomic History    Marital status: /Civil Union     Spouse name: Not on file    Number of children: 1    Years of education: Not on file    Highest education level: Not on file   Occupational History    Not on file   Tobacco Use    Smoking status: Former Smoker     Quit date: 2013     Years since quittin 6    Smokeless tobacco: Never Used   Vaping Use    Vaping Use: Never used   Substance and Sexual Activity    Alcohol use: No    Drug use: No    Sexual activity: Yes     Partners: Male     Birth control/protection: Male Sterilization   Other Topics Concern    Not on file   Social History Narrative    2 cups of coffee daily    Exercises regularly    Uses seatbelts     Social Determinants of Health     Financial Resource Strain: Not on file   Food Insecurity: Not on file   Transportation Needs: Not on file   Physical Activity: Not on file   Stress: Not on file   Social Connections: Not on file   Intimate Partner Violence: Not on file   Housing Stability: Not on file       Allergies   Allergen Reactions    Erythromycin Anaphylaxis and Rash     Reaction Date: [id number];     Pollen Extract Sneezing         Current Outpatient Medications:     lisinopril (ZESTRIL) 5 mg tablet, Take 5 mg by mouth daily, Disp: , Rfl:     LORazepam (ATIVAN) 0 5 mg tablet, Take 0 5 mg by mouth 2 (two) times a day as needed, Disp: , Rfl:     sertraline (ZOLOFT) 50 mg tablet, Take by mouth, Disp: , Rfl:       /70 (BP Location: Left arm, Patient Position: Sitting, Cuff Size: Standard)   Pulse 100   Ht 5' (1 524 m)   Wt 70 8 kg (156 lb)   BMI 30 47 kg/m²          Physical Exam  Vitals reviewed  Constitutional:       Appearance: Normal appearance  HENT:      Head: Normocephalic and atraumatic  Eyes:      Extraocular Movements: Extraocular movements intact  Cardiovascular:      Pulses: Normal pulses  Heart sounds: Normal heart sounds  Pulmonary:      Effort: Pulmonary effort is normal       Breath sounds: Normal breath sounds  Musculoskeletal:         General: No swelling  Normal range of motion  Comments: Left posterior calf varicosity   Skin:     General: Skin is warm  Neurological:      General: No focal deficit present  Mental Status: She is alert and oriented to person, place, and time

## 2022-09-07 ENCOUNTER — HOSPITAL ENCOUNTER (EMERGENCY)
Facility: HOSPITAL | Age: 37
Discharge: HOME | End: 2022-09-07
Attending: EMERGENCY MEDICINE
Payer: COMMERCIAL

## 2022-09-07 VITALS
SYSTOLIC BLOOD PRESSURE: 143 MMHG | DIASTOLIC BLOOD PRESSURE: 77 MMHG | OXYGEN SATURATION: 98 % | RESPIRATION RATE: 20 BRPM | HEART RATE: 72 BPM | TEMPERATURE: 98.4 F

## 2022-09-07 DIAGNOSIS — R11.2 NAUSEA AND VOMITING, UNSPECIFIED VOMITING TYPE: Primary | ICD-10-CM

## 2022-09-07 LAB
ALBUMIN SERPL BCP-MCNC: 4.3 G/DL (ref 3.5–5)
ALP SERPL-CCNC: 60 U/L (ref 34–104)
ALT SERPL W P-5'-P-CCNC: 24 U/L (ref 7–52)
ANION GAP SERPL CALCULATED.3IONS-SCNC: 7 MMOL/L (ref 4–13)
AST SERPL W P-5'-P-CCNC: 15 U/L (ref 13–39)
BASOPHILS # BLD AUTO: 0.06 THOUSANDS/ΜL (ref 0–0.1)
BASOPHILS NFR BLD AUTO: 0 % (ref 0–1)
BILIRUB SERPL-MCNC: 0.39 MG/DL (ref 0.2–1)
BILIRUB UR QL STRIP: NEGATIVE
BUN SERPL-MCNC: 13 MG/DL (ref 5–25)
CALCIUM SERPL-MCNC: 9.5 MG/DL (ref 8.4–10.2)
CHLORIDE SERPL-SCNC: 105 MMOL/L (ref 96–108)
CLARITY UR: CLEAR
CO2 SERPL-SCNC: 28 MMOL/L (ref 21–32)
COLOR UR: COLORLESS
CREAT SERPL-MCNC: 0.64 MG/DL (ref 0.6–1.3)
EOSINOPHIL # BLD AUTO: 0.18 THOUSAND/ΜL (ref 0–0.61)
EOSINOPHIL NFR BLD AUTO: 1 % (ref 0–6)
ERYTHROCYTE [DISTWIDTH] IN BLOOD BY AUTOMATED COUNT: 12.6 % (ref 11.6–15.1)
EXT PREG TEST URINE: NEGATIVE
EXT. CONTROL ED NAV: NORMAL
GFR SERPL CREATININE-BSD FRML MDRD: 113 ML/MIN/1.73SQ M
GLUCOSE SERPL-MCNC: 102 MG/DL (ref 65–140)
GLUCOSE UR STRIP-MCNC: NEGATIVE MG/DL
HCT VFR BLD AUTO: 42.9 % (ref 34.8–46.1)
HGB BLD-MCNC: 14.2 G/DL (ref 11.5–15.4)
HGB UR QL STRIP.AUTO: NEGATIVE
IMM GRANULOCYTES # BLD AUTO: 0.16 THOUSAND/UL (ref 0–0.2)
IMM GRANULOCYTES NFR BLD AUTO: 1 % (ref 0–2)
KETONES UR STRIP-MCNC: NEGATIVE MG/DL
LACTATE SERPL-SCNC: 1.2 MMOL/L (ref 0.5–2)
LEUKOCYTE ESTERASE UR QL STRIP: NEGATIVE
LIPASE SERPL-CCNC: 30 U/L (ref 11–82)
LYMPHOCYTES # BLD AUTO: 2.26 THOUSANDS/ΜL (ref 0.6–4.47)
LYMPHOCYTES NFR BLD AUTO: 17 % (ref 14–44)
MCH RBC QN AUTO: 30.5 PG (ref 26.8–34.3)
MCHC RBC AUTO-ENTMCNC: 33.1 G/DL (ref 31.4–37.4)
MCV RBC AUTO: 92 FL (ref 82–98)
MONOCYTES # BLD AUTO: 0.98 THOUSAND/ΜL (ref 0.17–1.22)
MONOCYTES NFR BLD AUTO: 7 % (ref 4–12)
NEUTROPHILS # BLD AUTO: 10.04 THOUSANDS/ΜL (ref 1.85–7.62)
NEUTS SEG NFR BLD AUTO: 74 % (ref 43–75)
NITRITE UR QL STRIP: NEGATIVE
NRBC BLD AUTO-RTO: 0 /100 WBCS
PH UR STRIP.AUTO: 6.5 [PH]
PLATELET # BLD AUTO: 329 THOUSANDS/UL (ref 149–390)
PMV BLD AUTO: 9 FL (ref 8.9–12.7)
POTASSIUM SERPL-SCNC: 3.9 MMOL/L (ref 3.5–5.3)
PROT SERPL-MCNC: 6.9 G/DL (ref 6.4–8.4)
PROT UR STRIP-MCNC: NEGATIVE MG/DL
RBC # BLD AUTO: 4.65 MILLION/UL (ref 3.81–5.12)
SODIUM SERPL-SCNC: 140 MMOL/L (ref 135–147)
SP GR UR STRIP.AUTO: 1 (ref 1–1.03)
UROBILINOGEN UR STRIP-ACNC: <2 MG/DL
WBC # BLD AUTO: 13.68 THOUSAND/UL (ref 4.31–10.16)

## 2022-09-07 PROCEDURE — 96361 HYDRATE IV INFUSION ADD-ON: CPT

## 2022-09-07 PROCEDURE — 36415 COLL VENOUS BLD VENIPUNCTURE: CPT | Performed by: PHYSICIAN ASSISTANT

## 2022-09-07 PROCEDURE — 83605 ASSAY OF LACTIC ACID: CPT | Performed by: PHYSICIAN ASSISTANT

## 2022-09-07 PROCEDURE — 81025 URINE PREGNANCY TEST: CPT | Performed by: PHYSICIAN ASSISTANT

## 2022-09-07 PROCEDURE — 85025 COMPLETE CBC W/AUTO DIFF WBC: CPT | Performed by: PHYSICIAN ASSISTANT

## 2022-09-07 PROCEDURE — 99283 EMERGENCY DEPT VISIT LOW MDM: CPT

## 2022-09-07 PROCEDURE — 99284 EMERGENCY DEPT VISIT MOD MDM: CPT | Performed by: PHYSICIAN ASSISTANT

## 2022-09-07 PROCEDURE — 81003 URINALYSIS AUTO W/O SCOPE: CPT | Performed by: PHYSICIAN ASSISTANT

## 2022-09-07 PROCEDURE — 83690 ASSAY OF LIPASE: CPT | Performed by: PHYSICIAN ASSISTANT

## 2022-09-07 PROCEDURE — 96374 THER/PROPH/DIAG INJ IV PUSH: CPT

## 2022-09-07 PROCEDURE — 80053 COMPREHEN METABOLIC PANEL: CPT | Performed by: PHYSICIAN ASSISTANT

## 2022-09-07 RX ORDER — SUCRALFATE 1 G/1
1 TABLET ORAL ONCE
Status: COMPLETED | OUTPATIENT
Start: 2022-09-07 | End: 2022-09-07

## 2022-09-07 RX ORDER — ONDANSETRON 4 MG/1
4 TABLET, ORALLY DISINTEGRATING ORAL EVERY 6 HOURS PRN
Qty: 20 TABLET | Refills: 0 | Status: SHIPPED | OUTPATIENT
Start: 2022-09-07

## 2022-09-07 RX ORDER — ONDANSETRON 2 MG/ML
4 INJECTION INTRAMUSCULAR; INTRAVENOUS ONCE
Status: COMPLETED | OUTPATIENT
Start: 2022-09-07 | End: 2022-09-07

## 2022-09-07 RX ADMIN — SUCRALFATE 1 G: 1 TABLET ORAL at 02:16

## 2022-09-07 RX ADMIN — ONDANSETRON 4 MG: 2 INJECTION INTRAMUSCULAR; INTRAVENOUS at 02:18

## 2022-09-07 RX ADMIN — SODIUM CHLORIDE 1000 ML: 0.9 INJECTION, SOLUTION INTRAVENOUS at 02:16

## 2022-09-07 NOTE — ED PROVIDER NOTES
History  Chief Complaint   Patient presents with    Vomiting     Pt states she had fluid in her ear last week to which her  dr gave her prednisone  She then became nauseous  and vomiting tonight with a low temperature at home  Patient is a 79-year-old female presenting to the emergency room for evaluation  Patient states today she took 50 mg of prednisone on empty stomach  She states shortly after she began dry heaving  She states she was sick to her stomach all day  She states prior to arrival she had 1 episode of vomiting  She states she checked her temperature at this time and her trauma to her said her temperature was 95° F  she states this made her anxious so she came to the ER for evaluation  Patient still has persistent nausea  She states she was on steroids last week for fluid behind the ear  She finished that course and then today restarted a new course of steroids due to persistent symptoms  She denies any fever, chills, diarrhea, chest pain, shortness of breath, urinary symptoms  History provided by:  Patient   used: No        Prior to Admission Medications   Prescriptions Last Dose Informant Patient Reported? Taking?    LORazepam (ATIVAN) 0 5 mg tablet  Self Yes No   Sig: Take 0 5 mg by mouth 2 (two) times a day as needed   lisinopril (ZESTRIL) 5 mg tablet  Self Yes No   Sig: Take 5 mg by mouth daily   sertraline (ZOLOFT) 50 mg tablet  Self Yes No   Sig: Take by mouth      Facility-Administered Medications: None       Past Medical History:   Diagnosis Date    Bronchitis     Gestational diabetes     gd    Migraines     Last Assessed:11/15/13    Normal delivery     2014 daughter    Varicella     childhood    Vulvar dystrophy     Last Assessed:9/19/17       Past Surgical History:   Procedure Laterality Date    TOOTH EXTRACTION         Family History   Problem Relation Age of Onset    Thyroid disease Mother     Migraines Mother     Hypertension Mother     No Known Problems Father     Asthma Brother     Diabetes Maternal Grandmother     Heart disease Maternal Grandmother     Ovarian cancer Maternal Aunt     Breast cancer Neg Hx     Colon cancer Neg Hx     Uterine cancer Neg Hx     Cervical cancer Neg Hx      I have reviewed and agree with the history as documented  E-Cigarette/Vaping    E-Cigarette Use Never User      E-Cigarette/Vaping Substances     Social History     Tobacco Use    Smoking status: Former Smoker     Quit date: 2013     Years since quittin 6    Smokeless tobacco: Never Used   Vaping Use    Vaping Use: Never used   Substance Use Topics    Alcohol use: No    Drug use: No       Review of Systems   Constitutional: Negative for chills and fever  HENT: Negative for ear pain and sore throat  Eyes: Negative for pain and visual disturbance  Respiratory: Negative for cough and shortness of breath  Cardiovascular: Negative for chest pain and palpitations  Gastrointestinal: Positive for nausea and vomiting  Negative for abdominal pain  Genitourinary: Negative for dysuria and hematuria  Musculoskeletal: Negative for arthralgias and back pain  Skin: Negative for color change and rash  Neurological: Negative for seizures and syncope  All other systems reviewed and are negative  Physical Exam  Physical Exam  Vitals and nursing note reviewed  Constitutional:       General: She is not in acute distress  Appearance: She is well-developed  HENT:      Head: Normocephalic and atraumatic  Eyes:      Conjunctiva/sclera: Conjunctivae normal    Cardiovascular:      Rate and Rhythm: Normal rate and regular rhythm  Heart sounds: No murmur heard  Pulmonary:      Effort: Pulmonary effort is normal  No respiratory distress  Breath sounds: Normal breath sounds  Abdominal:      Palpations: Abdomen is soft  Tenderness: There is no abdominal tenderness        Comments: Abdomen is completely soft and nontender Musculoskeletal:      Cervical back: Neck supple  Skin:     General: Skin is warm and dry  Neurological:      Mental Status: She is alert           Vital Signs  ED Triage Vitals [09/07/22 0122]   Temperature Pulse Respirations Blood Pressure SpO2   98 °F (36 7 °C) (!) 117 20 148/98 99 %      Temp Source Heart Rate Source Patient Position - Orthostatic VS BP Location FiO2 (%)   Oral Monitor Sitting Right arm --      Pain Score       No Pain           Vitals:    09/07/22 0122 09/07/22 0200 09/07/22 0400   BP: 148/98 125/79 143/77   Pulse: (!) 117 103 72   Patient Position - Orthostatic VS: Sitting Sitting Sitting         ED Medications  Medications   sodium chloride 0 9 % bolus 1,000 mL (0 mL Intravenous Stopped 9/7/22 0442)   ondansetron (ZOFRAN) injection 4 mg (4 mg Intravenous Given 9/7/22 0218)   sucralfate (CARAFATE) tablet 1 g (1 g Oral Given 9/7/22 0216)       Diagnostic Studies  Results Reviewed     Procedure Component Value Units Date/Time    UA (URINE) with reflex to Scope [[id number]] Collected: 09/07/22 0441    Lab Status: Final result Specimen: Urine, Clean Catch Updated: 09/07/22 0454     Color, UA Colorless     Clarity, UA Clear     Specific Gravity, UA 1 004     pH, UA 6 5     Leukocytes, UA Negative     Nitrite, UA Negative     Protein, UA Negative mg/dl      Glucose, UA Negative mg/dl      Ketones, UA Negative mg/dl      Urobilinogen, UA <2 0 mg/dl      Bilirubin, UA Negative     Occult Blood, UA Negative    POCT pregnancy, urine [[id number]]  (Normal) Resulted: 09/07/22 0441    Lab Status: Final result Updated: 09/07/22 0442     EXT PREG TEST UR (Ref: Negative) Negative     Control Valid    Comprehensive metabolic panel [[id number]] Collected: 09/07/22 0219    Lab Status: Final result Specimen: Blood from Arm, Right Updated: 09/07/22 0249     Sodium 140 mmol/L      Potassium 3 9 mmol/L      Chloride 105 mmol/L      CO2 28 mmol/L      ANION GAP 7 mmol/L      BUN 13 mg/dL      Creatinine 0 64 mg/dL Glucose 102 mg/dL      Calcium 9 5 mg/dL      AST 15 U/L      ALT 24 U/L      Alkaline Phosphatase 60 U/L      Total Protein 6 9 g/dL      Albumin 4 3 g/dL      Total Bilirubin 0 39 mg/dL      eGFR 113 ml/min/1 73sq m     Narrative:      [place] guidelines for Chronic Kidney Disease (CKD):     Stage 1 with normal or high GFR (GFR > 90 mL/min/1 73 square meters)    Stage 2 Mild CKD (GFR = 60-89 mL/min/1 73 square meters)    Stage 3A Moderate CKD (GFR = 45-59 mL/min/1 73 square meters)    Stage 3B Moderate CKD (GFR = 30-44 mL/min/1 73 square meters)    Stage 4 Severe CKD (GFR = 15-29 mL/min/1 73 square meters)    Stage 5 End Stage CKD (GFR <15 mL/min/1 73 square meters)  Note: GFR calculation is accurate only with a steady state creatinine    Lipase [[id number]]  (Normal) Collected: 09/07/22 0219    Lab Status: Final result Specimen: Blood from Arm, Right Updated: 09/07/22 0249     Lipase 30 u/L     Lactic acid, plasma [[id number]]  (Normal) Collected: 09/07/22 0219    Lab Status: Final result Specimen: Blood from Arm, Right Updated: 09/07/22 0246     LACTIC ACID 1 2 mmol/L     Narrative:      Result may be elevated if tourniquet was used during collection      CBC and differential [[id number]]  (Abnormal) Collected: 09/07/22 0219    Lab Status: Final result Specimen: Blood from Arm, Right Updated: 09/07/22 0235     WBC 13 68 Thousand/uL      RBC 4 65 Million/uL      Hemoglobin 14 2 g/dL      Hematocrit 42 9 %      MCV 92 fL      MCH 30 5 pg      MCHC 33 1 g/dL      RDW 12 6 %      MPV 9 0 fL      Platelets 409 Thousands/uL      nRBC 0 /100 WBCs      Neutrophils Relative 74 %      Immat GRANS % 1 %      Lymphocytes Relative 17 %      Monocytes Relative 7 %      Eosinophils Relative 1 %      Basophils Relative 0 %      Neutrophils Absolute 10 04 Thousands/µL      Immature Grans Absolute 0 16 Thousand/uL      Lymphocytes Absolute 2 26 Thousands/µL      Monocytes Absolute 0 98 Thousand/µL Eosinophils Absolute 0 18 Thousand/µL      Basophils Absolute 0 06 Thousands/µL                  No orders to display                   ED Course  ED Course as of 09/07/22 0628   Wed Sep 07, 2022   0444 Re-evaluation at bedside, patient feels better, would like to go home  Updated patient on lab results  Counseling as below  The appropriate recommended follow up and warning signs for return to the nearest ED were explained  patient's questions answered; patient is competent and reliable, verbalized understanding of all that was explained, and agrees with the disposition and further plan of treatment  patient was additionally provided with detailed follow up care instructions, phone numbers to call within our institution for other concerns or inquiries  Advised to follow up with PCP  Pt is in no acute distress and is stable for discharge at this time  MDM  Number of Diagnoses or Management Options  Nausea and vomiting, unspecified vomiting type: new and requires workup  Diagnosis management comments: Patient presenting with nausea and 1 episode of vomiting status post taking prednisone on an empty stomach  Patient states she checked her temperature at home and it was low  She states this made her anxious so she came to the ER  On exam patient is minimally tachycardic which resolved with fluids  Patient's abdomen is soft and nontender  Patient's temperature has been normal in the emergency room  Patient was given fluids, and Zofran  She had significant relief  CBC showing minimal leukocytosis, likely related to the vomited, or the steroids  Offered patient CT scan for her vomiting, however she refused at this time  Patient tolerated p o   Advised to return to the ER if anything acutely changes         Amount and/or Complexity of Data Reviewed  Clinical lab tests: ordered and reviewed    Patient Progress  Patient progress: stable      Disposition  Final diagnoses:   Nausea and vomiting, unspecified vomiting type     Time reflects when diagnosis was documented in both MDM as applicable and the Disposition within this note     Time User Action Codes Description Comment    9/7/2022  4:35 AM Noy Sanches Add [R11 2] Nausea and vomiting, unspecified vomiting type       ED Disposition     ED Disposition   Discharge    Condition   Stable    Date/Time   Wed Sep 7, 2022  4:45 AM    Comment   Celestine Pina Jesus Albertos discharge to home/self care  Follow-up Information     Follow up With Specialties Details Why [address], DO Family Medicine Schedule an appointment as soon as possible for a visit   [id number]  [facility] [address] [address] Emergency Department Emergency Medicine  If symptoms worsen [address] [facility] [address] [facility] Emergency Department, [address], [place], [place], [address]          Discharge Medication List as of 9/7/2022  4:45 AM      START taking these medications    Details   ondansetron (Zofran ODT) 4 mg disintegrating tablet Take 1 tablet (4 mg total) by mouth every 6 (six) hours as needed for nausea or vomiting, Starting Wed 9/7/2022, Normal         CONTINUE these medications which have NOT CHANGED    Details   lisinopril (ZESTRIL) 5 mg tablet Take 5 mg by mouth daily, Starting Fri 7/15/2022, Historical Med      LORazepam (ATIVAN) 0 5 mg tablet Take 0 5 mg by mouth 2 (two) times a day as needed, Starting Wed 7/20/2022, Historical Med      sertraline (ZOLOFT) 50 mg tablet Take by mouth, Starting Mon 8/1/2022, Historical Med             No discharge procedures on file      PDMP Review     None          ED Provider  Electronically Signed by           Shahana Naidu PA-C  09/07/22 5662

## 2022-09-12 ENCOUNTER — OFFICE VISIT (OUTPATIENT)
Dept: URGENT CARE | Facility: MEDICAL CENTER | Age: 37
End: 2022-09-12
Payer: COMMERCIAL

## 2022-09-12 VITALS
OXYGEN SATURATION: 98 % | HEIGHT: 60 IN | HEART RATE: 119 BPM | TEMPERATURE: 98.7 F | SYSTOLIC BLOOD PRESSURE: 134 MMHG | DIASTOLIC BLOOD PRESSURE: 81 MMHG | WEIGHT: 153 LBS | BODY MASS INDEX: 30.04 KG/M2 | RESPIRATION RATE: 18 BRPM

## 2022-09-12 DIAGNOSIS — R07.89 ATYPICAL CHEST PAIN: Primary | ICD-10-CM

## 2022-09-12 LAB
ATRIAL RATE: 109 BPM
P AXIS: 64 DEGREES
PR INTERVAL: 150 MS
QRS AXIS: 49 DEGREES
QRSD INTERVAL: 76 MS
QT INTERVAL: 304 MS
QTC INTERVAL: 409 MS
T WAVE AXIS: 54 DEGREES
VENTRICULAR RATE: 109 BPM

## 2022-09-12 PROCEDURE — 99213 OFFICE O/P EST LOW 20 MIN: CPT | Performed by: PHYSICIAN ASSISTANT

## 2022-09-12 PROCEDURE — 93010 ELECTROCARDIOGRAM REPORT: CPT | Performed by: INTERNAL MEDICINE

## 2022-09-12 PROCEDURE — 93005 ELECTROCARDIOGRAM TRACING: CPT | Performed by: PHYSICIAN ASSISTANT

## 2022-09-12 NOTE — LETTER
September 12, 2022     Patient: Allyssa Cardoso   YOB: 1985   Date of Visit: 9/12/2022       To Whom it May Concern:    Raoulleroy Lawrences was seen in my clinic on 9/12/2022  She is to be excused from work through 09/14/2022  If you have any questions or concerns, please don't hesitate to call           Sincerely,          David Lehman PA-C        CC: No Recipients

## 2022-09-12 NOTE — PROGRESS NOTES
[email or web address]        NAME: Almeda Frankel is a 44 y o  female  : 1985    MRN: [id number]  DATE: 2022  TIME: 10:20 AM    Assessment and Plan   Atypical chest pain [R07 89]  1  Atypical chest pain  ECG 12 lead    Ambulatory Referral to Cardiology         Patient Instructions   1  Go to the ER immediately for any worsening symptoms as discussed  2  Follow-up with Cardiology: A referral has been placed for you  3  Use the Ativan as prescribed for anxiety breakthrough  Chief Complaint     Chief Complaint   Patient presents with    Chest Pain     Was at gym working chest area on Friday  Since has been having some chest tightness on and off  History of Present Illness       59-year-old female with brief episodic episodes of chest pressure since 09/10/2022 associated with increased anxiety  Patient denies any symptoms with exertion, in fact she works out at [employer] regularly without symptoms  States that she did do upper body weight training on Friday in thinks maybe this has something to do with the pain  She denies any shortness of breath, dizziness, abdominal pain, diaphoresis  She had a very similar episode in late 2022 and was worked up in the ER and all testing there was negative  Patient is currently asymptomatic  She states that the discomfort is the pressure-type but can occur anywhere in her chest   It is nonradiating  Slight reproduction with deep breath  Review of Systems   Review of Systems   Constitutional: Negative for chills, fatigue and fever  HENT: Negative for congestion, ear pain and sore throat  Eyes: Negative for pain and visual disturbance  Respiratory: Positive for chest tightness  Negative for cough and shortness of breath  Cardiovascular: Positive for chest pain  Negative for palpitations  Gastrointestinal: Negative for abdominal pain and vomiting  Genitourinary: Negative for dysuria and hematuria     Musculoskeletal: Negative for arthralgias and back pain  Skin: Negative for color change and rash  Neurological: Negative for seizures and syncope  Psychiatric/Behavioral: The patient is nervous/anxious  All other systems reviewed and are negative          Current Medications       Current Outpatient Medications:     lisinopril (ZESTRIL) 5 mg tablet, Take 5 mg by mouth daily, Disp: , Rfl:     LORazepam (ATIVAN) 0 5 mg tablet, Take 0 5 mg by mouth 2 (two) times a day as needed, Disp: , Rfl:     sertraline (ZOLOFT) 50 mg tablet, Take by mouth, Disp: , Rfl:     ondansetron (Zofran ODT) 4 mg disintegrating tablet, Take 1 tablet (4 mg total) by mouth every 6 (six) hours as needed for nausea or vomiting (Patient not taking: Reported on 9/12/2022), Disp: 20 tablet, Rfl: 0    Current Allergies     Allergies as of 09/12/2022 - Reviewed 09/12/2022   Allergen Reaction Noted    Erythromycin Anaphylaxis and Rash 04/22/2012    Pollen extract Sneezing 11/15/2013            The following portions of the patient's history were reviewed and updated as appropriate: allergies, current medications, past family history, past medical history, past social history, past surgical history and problem list      Past Medical History:   Diagnosis Date    Bronchitis     Gestational diabetes     gd    Migraines     Last Assessed:11/15/13    Normal delivery     2014 daughter    Varicella     childhood    Vulvar dystrophy     Last Assessed:9/19/17       Past Surgical History:   Procedure Laterality Date    TOOTH EXTRACTION         Family History   Problem Relation Age of Onset    Thyroid disease Mother    [facility] Migraines Mother     Hypertension Mother     No Known Problems Father     Asthma Brother     Diabetes Maternal Grandmother     Heart disease Maternal Grandmother     Ovarian cancer Maternal Aunt     Breast cancer Neg Hx     Colon cancer Neg Hx     Uterine cancer Neg Hx     Cervical cancer Neg Hx          Medications have been verified  Objective   /81   Pulse (!) 119   Temp 98 7 °F (37 1 °C) (Temporal)   Resp 18   Ht 5' (1 524 m)   Wt 69 4 kg (153 lb)   SpO2 98%   BMI 29 88 kg/m²        Physical Exam     Physical Exam  Constitutional:       General: She is not in acute distress  Appearance: Normal appearance  She is well-developed  She is not ill-appearing  HENT:      Head: Normocephalic  Nose: Nose normal    Eyes:      Extraocular Movements: Extraocular movements intact  Conjunctiva/sclera: Conjunctivae normal       Pupils: Pupils are equal, round, and reactive to light  Cardiovascular:      Rate and Rhythm: Regular rhythm  Tachycardia present  Heart sounds: Normal heart sounds  No murmur heard  Pulmonary:      Effort: Pulmonary effort is normal       Breath sounds: Normal breath sounds  Abdominal:      General: There is no abdominal bruit  Musculoskeletal:      Cervical back: Normal range of motion  Skin:     General: Skin is warm and dry  Capillary Refill: Capillary refill takes less than 2 seconds  Neurological:      General: No focal deficit present  Mental Status: She is alert and oriented to person, place, and time  Psychiatric:         Mood and Affect: Mood normal          Behavior: Behavior normal             Preliminary interpretation of EKG:   Sinus tachycardia rate of 109 without acute ST or T-wave changes  Medical decision making note:   Due to the atypical nature of the symptoms, the being not exertional, and being associated with anxiety, and recently having a negative ER workup and having a negative EKG and being asymptomatic here in the clinic, will refer to outpatient Cardiology with the strict instructions that she go to the ER with any recurrent or worsening symptoms

## 2022-09-12 NOTE — PATIENT INSTRUCTIONS
1  Go to the ER immediately for any worsening symptoms as discussed  2  Follow-up with Cardiology: A referral has been placed for you  3  Use the Ativan as prescribed for anxiety breakthrough

## 2022-09-15 PROCEDURE — 93010 ELECTROCARDIOGRAM REPORT: CPT | Performed by: INTERNAL MEDICINE

## 2022-09-21 NOTE — OB LABOR/OXYTOCIN SAFETY PROGRESS
Labor Progress Note - Maki Helm 28 y o  female MRN: [id number]    Unit/Bed#: -01 Encounter: [id number]    Obstetric History       T1      L1     SAB0   TAB0   Ectopic0   Multiple0   Live Births1    Obstetric Comments   Normal delivery 2014 daughter     Gestational Age: 38w3d     Contraction Frequency (minutes): 2-3  Contraction Quality: Moderate  Tachysystole: No   Dilation: 7        Effacement (%): 80  Station: -1  Baseline Rate: 135 bpm  Fetal Heart Rate: 160 BPM  FHR Category: Category I          Notes/comments:   Patient is currently comfortable with epidural at this time, making cervical change on exam   Will continue expectant management  Dr Jan Ortiz aware and agrees with plan            Lee Cali MD 2018 8:06 AM 21-Sep-2022 14:16

## 2022-09-27 ENCOUNTER — OFFICE VISIT (OUTPATIENT)
Dept: URGENT CARE | Facility: MEDICAL CENTER | Age: 37
End: 2022-09-27
Payer: COMMERCIAL

## 2022-09-27 VITALS
TEMPERATURE: 98 F | HEART RATE: 118 BPM | HEIGHT: 60 IN | RESPIRATION RATE: 18 BRPM | WEIGHT: 153 LBS | OXYGEN SATURATION: 99 % | BODY MASS INDEX: 30.04 KG/M2

## 2022-09-27 DIAGNOSIS — F41.9 ANXIETY: Primary | ICD-10-CM

## 2022-09-27 PROCEDURE — 99213 OFFICE O/P EST LOW 20 MIN: CPT | Performed by: PHYSICIAN ASSISTANT

## 2022-09-27 PROCEDURE — 93005 ELECTROCARDIOGRAM TRACING: CPT | Performed by: PHYSICIAN ASSISTANT

## 2022-09-27 NOTE — PROGRESS NOTES
[facility]        NAME: Rosina Lemus is a 44 y o  female  : 1985    MRN: [id number]  DATE: 2022  TIME: 12:34 PM    Assessment and Plan   Anxiety [F41 9]  1  Anxiety           Patient Instructions     Anxiety  Referred to therapist  Follow up with PCP in 3-5 days  Proceed to  ER if symptoms worsen  Chief Complaint     Chief Complaint   Patient presents with    Anxiety     Patient states she has anxiety and panic and had an off day today; rash developed on chest; patient took lorazepam prior to arrival; patient states she has been evaluated multiple times for chest pressure in ED and urgent care and EKG's are always unremarkable         History of Present Illness       14-year-old female who presents complaining of having had a panic attack  Patient states that she has had similar episodes in the past which she was feeling very nervous  States that she noticed a rash on her chest and she wanted to make sure that he was not cardiac related  States she has been seen for similar episodes multiple times  Denies chest pain, palpitations, shortness of breath, nausea, vomiting, cough sweats  Review of Systems   Review of Systems   Constitutional: Negative  HENT: Negative  Eyes: Negative  Respiratory: Negative  Negative for cough, chest tightness, shortness of breath, wheezing and stridor  Cardiovascular: Negative  Negative for chest pain, palpitations and leg swelling  Skin: Positive for rash  Psychiatric/Behavioral: The patient is nervous/anxious            Current Medications       Current Outpatient Medications:     lisinopril (ZESTRIL) 5 mg tablet, Take 5 mg by mouth daily, Disp: , Rfl:     LORazepam (ATIVAN) 0 5 mg tablet, Take 0 5 mg by mouth 2 (two) times a day as needed, Disp: , Rfl:     ondansetron (Zofran ODT) 4 mg disintegrating tablet, Take 1 tablet (4 mg total) by mouth every 6 (six) hours as needed for nausea or vomiting (Patient not taking: Reported on 9/12/2022), Disp: 20 tablet, Rfl: 0    sertraline (ZOLOFT) 50 mg tablet, Take by mouth, Disp: , Rfl:     Current Allergies     Allergies as of 09/27/2022 - Reviewed 09/27/2022   Allergen Reaction Noted    Erythromycin Anaphylaxis and Rash 04/22/2012    Pollen extract Sneezing 11/15/2013            The following portions of the patient's history were reviewed and updated as appropriate: allergies, current medications, past family history, past medical history, past social history, past surgical history and problem list      Past Medical History:   Diagnosis Date    Bronchitis     Gestational diabetes     gd    Migraines     Last Assessed:11/15/13    Normal delivery     2014 daughter    Varicella     childhood    Vulvar dystrophy     Last Assessed:9/19/17       Past Surgical History:   Procedure Laterality Date    TOOTH EXTRACTION         Family History   Problem Relation Age of Onset    Thyroid disease Mother    Naomi Rhody Migraines Mother     Hypertension Mother     No Known Problems Father     Asthma Brother     Diabetes Maternal Grandmother     Heart disease Maternal Grandmother     Ovarian cancer Maternal Aunt     Breast cancer Neg Hx     Colon cancer Neg Hx     Uterine cancer Neg Hx     Cervical cancer Neg Hx          Medications have been verified  Objective   Pulse (!) 118   Temp 98 °F (36 7 °C)   Resp 18   Ht 5' (1 524 m)   Wt 69 4 kg (153 lb)   SpO2 99%   BMI 29 88 kg/m²        Physical Exam     Physical Exam  Constitutional:       General: She is not in acute distress  Appearance: Normal appearance  She is well-developed  She is not diaphoretic  HENT:      Head: Normocephalic and atraumatic  Right Ear: Hearing, tympanic membrane, ear canal and external ear normal       Left Ear: Hearing, tympanic membrane, ear canal and external ear normal       Nose: No rhinorrhea  Mouth/Throat:      Pharynx: Uvula midline     Cardiovascular:      Rate and Rhythm: Normal rate and regular rhythm  Heart sounds: Normal heart sounds  Pulmonary:      Effort: Pulmonary effort is normal  No respiratory distress  Breath sounds: Normal breath sounds  No stridor  No wheezing, rhonchi or rales  Chest:      Chest wall: No tenderness  Musculoskeletal:      Cervical back: Normal range of motion and neck supple  Lymphadenopathy:      Cervical: No cervical adenopathy  Neurological:      Mental Status: She is alert         EKG-normal sinus rhythm, rate 97, no ST abnormalities

## 2022-09-29 LAB
ATRIAL RATE: 97 BPM
P AXIS: 58 DEGREES
PR INTERVAL: 150 MS
QRS AXIS: 43 DEGREES
QRSD INTERVAL: 78 MS
QT INTERVAL: 342 MS
QTC INTERVAL: 434 MS
T WAVE AXIS: 17 DEGREES
VENTRICULAR RATE: 97 BPM

## 2022-09-29 PROCEDURE — 93010 ELECTROCARDIOGRAM REPORT: CPT | Performed by: INTERNAL MEDICINE

## 2022-10-11 ENCOUNTER — OFFICE VISIT (OUTPATIENT)
Dept: CARDIOLOGY CLINIC | Facility: MEDICAL CENTER | Age: 37
End: 2022-10-11
Payer: COMMERCIAL

## 2022-10-11 VITALS
DIASTOLIC BLOOD PRESSURE: 70 MMHG | WEIGHT: 160 LBS | HEART RATE: 99 BPM | SYSTOLIC BLOOD PRESSURE: 122 MMHG | HEIGHT: 60 IN | OXYGEN SATURATION: 97 % | BODY MASS INDEX: 31.41 KG/M2

## 2022-10-11 DIAGNOSIS — R07.9 CHEST PAIN, UNSPECIFIED TYPE: Primary | ICD-10-CM

## 2022-10-11 PROCEDURE — 99203 OFFICE O/P NEW LOW 30 MIN: CPT | Performed by: INTERNAL MEDICINE

## 2022-10-11 NOTE — PROGRESS NOTES
Cardiology Consultation     Marcellus Church  [id number]  1985  [facility] [place]  Misael Peña Rashidajadvinod [address] [place] [id number]      Diagnoses and all orders for this visit:    Chest pain, unspecified type    I had the pleasure of seeing Marcellus Church for a consultation regarding chest pain requested by Tracey Boyer    History of the Presenting Illness, Discussion/Summary and my Plan are as follows:::    Laurel Manzanares is a pleasant 59-year-old without dyslipidemia, without diabetes, possibly with hypertension although has been stressed out for the last 2 months when she was diagnosed with it, currently controlled on lisinopril 5 mg alone, minimal tobacco use about 2 and half pack years, having quit about 10 years ago, no alcohol or drug use  No family history of premature vascular disease  She has 2 siblings who are also fine, she has 2 children aged 6 and 3  She works as a  and has at least a moderate amount of stress at work  For the last few months, she has felt more anxious than before, has had episodes of chest pains, at least 2-3 that have led her to seek medical attention but with negative evaluation  I reviewed all her ECGs on the system all of which show normal sinus rhythm/sinus tachycardia without any acute ischemic changes  Her chest pains are in the right chest, brought on by emotional stress and improve with calming herself down or taking an anxiety attack  Not associated with shortness of breath or sweating  In fact she exercises regularly weight training 3 days of the week for 50 minutes each time, up to 175 lb without any chest pains, also walks 2-1/4 of a mi the rest of the days of the week without any symptoms      She strongly feels that her pains are noncardiac, more likely related to her anxiety but has found it hard to deal with them when she has an episode  Plan:    Chest pain:  Very atypical, unlikely to be cardiac, will check an exercise treadmill stress test to demonstrate functional capacity and  for reassurance     Elevated blood pressures:  She might not have hypertension, I told her that she can consider holding her lisinopril and checking blood pressures at home and keeping a log, while she is coming relaxed and using that as a gauge to consider reinitiating a medication  Lipids are controlled    Encouraged her to continue her physically active lifestyle  If her stress test is negative, which I believe it will be, she can follow up as needed     Latest Reference Range & Units 07/16/22 08:58   Cholesterol See Comment mg/dL 144   Triglycerides See Comment mg/dL 80   HDL >=50 mg/dL 45 (L)   Non-HDL Cholesterol mg/dl 99   LDL Calculated 0 - 100 mg/dL 83   LDL CHOLESTEROL DIRECT 0 - 100 mg/dl 84   (L): Data is abnormally low     Latest Reference Range & Units 07/30/22 21:20 07/30/22 23:56   hs TnI 0hr "Refer to ACS Flowchart"- see link ng/L <2    hs TnI 2hr "Refer to ACS Flowchart"- see link ng/L  <2   Delta 2hr hsTnI   See Comment       1   Chest pain, unspecified type       Patient Active Problem List   Diagnosis   • Migraines   • Encounter for gynecological examination (general) (routine) without abnormal findings   • Family history of benign neoplasm of ovary   • Bloating   • Family history of ovarian cancer   • Asymptomatic varicose vein of left lower extremity without complication   • Chest pain     Past Medical History:   Diagnosis Date   • Bronchitis    • Gestational diabetes     gd   • Migraines     Last Assessed:11/15/13   • Normal delivery     2014 daughter   • Varicella     childhood   • Vulvar dystrophy     Last Assessed:9/19/17     Social History     Socioeconomic History   • Marital status: /Civil Union     Spouse name: Not on file   • Number of children: 1   • Years of education: Not on file   • Highest education level: Not on file   Occupational History   • Not on file   Tobacco Use   • Smoking status: Former Smoker     Quit date: 2013     Years since quittin 7   • Smokeless tobacco: Never Used   Vaping Use   • Vaping Use: Never used   Substance and Sexual Activity   • Alcohol use: No   • Drug use: No   • Sexual activity: Yes     Partners: Male     Birth control/protection: Male Sterilization   Other Topics Concern   • Not on file   Social History Narrative    2 cups of coffee daily    Exercises regularly    Uses seatbelts     Social Determinants of Health     Financial Resource Strain: Not on file   Food Insecurity: Not on file   Transportation Needs: Not on file   Physical Activity: Not on file   Stress: Not on file   Social Connections: Not on file   Intimate Partner Violence: Not on file   Housing Stability: Not on file      Family History   Problem Relation Age of Onset   • Thyroid disease Mother    • Migraines Mother    • Hypertension Mother    • No Known Problems Father    • Asthma Brother    • Diabetes Maternal Grandmother    • Heart disease Maternal Grandmother    • Ovarian cancer Maternal Aunt    • Breast cancer Neg Hx    • Colon cancer Neg Hx    • Uterine cancer Neg Hx    • Cervical cancer Neg Hx      Past Surgical History:   Procedure Laterality Date   • TOOTH EXTRACTION         Current Outpatient Medications:   •  lisinopril (ZESTRIL) 5 mg tablet, Take 5 mg by mouth daily, Disp: , Rfl:   •  LORazepam (ATIVAN) 0 5 mg tablet, Take 0 5 mg by mouth 2 (two) times a day as needed, Disp: , Rfl:   •  sertraline (ZOLOFT) 50 mg tablet, Take by mouth, Disp: , Rfl:   •  ondansetron (Zofran ODT) 4 mg disintegrating tablet, Take 1 tablet (4 mg total) by mouth every 6 (six) hours as needed for nausea or vomiting (Patient not taking: No sig reported), Disp: 20 tablet, Rfl: 0  Allergies   Allergen Reactions   • Erythromycin Anaphylaxis and Rash     Reaction Date: ;    • Pollen Extract Sneezing     Vitals:    10/11/22 1324 BP: 122/70   BP Location: Left arm   Patient Position: Sitting   Cuff Size: Adult   Pulse: 99   SpO2: 97%   Weight: 72 6 kg (160 lb)   Height: 5' (1 524 m)         Imaging: No results found  Review of Systems:  Review of Systems   Constitutional: Negative  HENT: Negative  Eyes: Negative  Respiratory: Negative  Cardiovascular: Positive for chest pain  Negative for palpitations and leg swelling  Endocrine: Negative  Musculoskeletal: Negative  Physical Exam:    /70 (BP Location: Left arm, Patient Position: Sitting, Cuff Size: Adult)   Pulse 99   Ht 5' (1 524 m)   Wt 72 6 kg (160 lb)   SpO2 97%   BMI 31 25 kg/m²   Physical Exam  Constitutional:       General: She is not in acute distress  Appearance: Normal appearance  She is not ill-appearing  HENT:      Nose: Nose normal  No congestion or rhinorrhea  Mouth/Throat:      Mouth: Mucous membranes are moist       Pharynx: No oropharyngeal exudate or posterior oropharyngeal erythema  Neck:      Vascular: No carotid bruit  Cardiovascular:      Rate and Rhythm: Normal rate and regular rhythm  Pulses: Normal pulses  Heart sounds: No murmur heard  No friction rub  Pulmonary:      Effort: Pulmonary effort is normal  No respiratory distress  Breath sounds: No stridor  No wheezing or rhonchi  Musculoskeletal:         General: No swelling, tenderness, deformity or signs of injury  Normal range of motion  Cervical back: Normal range of motion  No rigidity or tenderness  Lymphadenopathy:      Cervical: No cervical adenopathy  Neurological:      Mental Status: She is alert  This note was completed in part utilizing m[employer] direct voice recognition software     Grammatical errors, random word insertion, spelling mistakes, occasional wrong word or "sound-alike" substitutions and incomplete sentences may be an occasional consequence of the system secondary to software limitations, ambient noise and hardware issues  At the time of dictation, efforts were made to edit, clarify and /or correct errors  Please read the chart carefully and recognize, using context, where substitutions have occurred  If you have any questions or concerns about the context, text or information contained within the body of this dictation, please contact myself, the provider, for further clarification

## 2022-11-21 ENCOUNTER — HOSPITAL ENCOUNTER (OUTPATIENT)
Dept: NON INVASIVE DIAGNOSTICS | Facility: CLINIC | Age: 37
Discharge: HOME | End: 2022-11-21

## 2022-11-21 DIAGNOSIS — R07.9 CHEST PAIN, UNSPECIFIED TYPE: ICD-10-CM

## 2022-11-21 LAB
BASELINE ST DEPRESSION: 0 MM
MAX HR PERCENT: 101 %
MAX HR: 184 BPM
RATE PRESSURE PRODUCT: NORMAL
SL CV STRESS RECOVERY BP: NORMAL MMHG
SL CV STRESS RECOVERY HR: 117 BPM
SL CV STRESS RECOVERY O2 SAT: 98 %
STRESS ANGINA INDEX: 0
STRESS BASELINE BP: NORMAL MMHG
STRESS BASELINE HR: 108 BPM
STRESS DUKE TREADMILL SCORE: 10
STRESS O2 SAT REST: 98 %
STRESS PEAK HR: 184 BPM
STRESS POST ESTIMATED WORKLOAD: 11.9 METS
STRESS POST EXERCISE DUR MIN: 10 MIN
STRESS POST O2 SAT PEAK: 96 %
STRESS POST PEAK BP: 160 MMHG
STRESS ST DEPRESSION: 0 MM

## 2022-11-21 NOTE — PROGRESS NOTES
Assessment/Plan:  Pap with high risk HPV testing every 5 years, if normal  Collected today  Sexually transmitted infection testing as indicated  Declined  Exercise most days of week-minimum of 150 minutes per week  Obtain appropriate diet and hydration  Calcium 1000mg + 600 vit D daily  HPV 9 vaccine recommended through age 39  Check with your insurance for coverage  If covered, call office to schedule start of vaccine series  Annual mammogram starting at age 36, monthly breast self exam  Seamus Marilyn 20 times twice daily  Return to office for vulvar biopsy for vulvar agglutination  1  Encounter for gynecological examination (general) (routine) without abnormal findings    2  Encounter for Papanicolaou smear for cervical cancer screening          Subjective:      Patient ID: Mary Kate Rubi is a 44 y o  female  HPI    Mary Kate Rubi is a 44 y o   (5 yo girl, 3 yo boy)  female who is here today for her annual visit  No health concerns  Currently being treated for a sinus infection  Monthly menses x 5 days with heavy flow x 1-2 days then mod to light flow  Menses is acceptable  Exercise- 3-4 times per week  Also walks other days  Works for [employer] mostly from home  Mary Kate Rubi is sexually active with male partner/  of 13 years  Monogamous and feels safe in this relationship  Denies vaginal pain,bleeding or dryness  She uses vasectomy  for contraception  She is not interested in STD screening today  She denies vaginal discharge, itching or pelvic pain  She has no urinary concerns, does not have incontinence  No bowel concerns  No breast concerns       Last pap: 17  Mammogram: N/A   Colonoscopy: N/A  HPV vaccine series:No     The following portions of the patient's history were reviewed and updated as appropriate: allergies, current medications, past family history, past medical history, past social history, past surgical history and problem list     Review of Systems   Constitutional: Negative  Negative for activity change, appetite change, chills, diaphoresis, fatigue, fever and unexpected weight change  HENT: Negative for congestion, dental problem, sneezing, sore throat and trouble swallowing  Eyes: Negative for visual disturbance  Respiratory: Negative for chest tightness and shortness of breath  Cardiovascular: Negative for chest pain and leg swelling  Gastrointestinal: Negative for abdominal pain, constipation, diarrhea, nausea and vomiting  Genitourinary: Negative for difficulty urinating, dyspareunia, dysuria, frequency, hematuria, menstrual problem, pelvic pain, urgency, vaginal bleeding, vaginal discharge and vaginal pain  Musculoskeletal: Negative for back pain and neck pain  Skin: Negative  Allergic/Immunologic: Negative  Neurological: Negative for weakness and headaches  Hematological: Negative for adenopathy  Psychiatric/Behavioral: Negative  Objective:      /88 (BP Location: Left arm, Patient Position: Sitting, Cuff Size: Standard)   Ht 5' (1 524 m)   Wt 73 kg (161 lb)   LMP 10/27/2022 (Exact Date)   BMI 31 44 kg/m²          Physical Exam  Vitals and nursing note reviewed  Constitutional:       Appearance: Normal appearance  She is well-developed  Comments: Tearful while discussing recommendation of vulvar biopsy   HENT:      Head: Normocephalic and atraumatic  Eyes:      General:         Right eye: No discharge  Left eye: No discharge  Neck:      Thyroid: No thyromegaly  Trachea: Trachea normal    Cardiovascular:      Rate and Rhythm: Normal rate and regular rhythm  Heart sounds: Normal heart sounds  Pulmonary:      Effort: Pulmonary effort is normal       Breath sounds: Normal breath sounds  Chest:   Breasts:     Breasts are symmetrical       Right: Normal  No inverted nipple, mass, nipple discharge, skin change or tenderness        Left: Normal  No inverted nipple, mass, nipple discharge, skin change or tenderness  Abdominal:      Palpations: Abdomen is soft  Genitourinary:     General: Normal vulva  Exam position: Lithotomy position  Labia:         Right: Lesion (see comments) present  No rash, tenderness or injury  Left: No rash, tenderness, lesion or injury  Urethra: No prolapse, urethral pain, urethral swelling or urethral lesion  Vagina: Normal  No signs of injury and foreign body  No vaginal discharge, erythema, tenderness or bleeding  Cervix: Normal       Uterus: Normal        Adnexa:         Right: No mass, tenderness or fullness  Left: No mass, tenderness or fullness  Rectum: No external hemorrhoid  Comments: Agglutination of labia minora  Erythema noted in hearts line  Raised white patch noted on right labia as noted on diagram    Musculoskeletal:         General: Normal range of motion  Cervical back: Normal range of motion and neck supple  Lymphadenopathy:      Head:      Right side of head: No submental, submandibular or tonsillar adenopathy  Left side of head: No submental, submandibular or tonsillar adenopathy  Cervical: No cervical adenopathy  Upper Body:      Right upper body: No supraclavicular or axillary adenopathy  Left upper body: No supraclavicular or axillary adenopathy  Lower Body: No right inguinal adenopathy  No left inguinal adenopathy  Skin:     General: Skin is warm and dry  Neurological:      Mental Status: She is alert and oriented to person, place, and time     Psychiatric:         Mood and Affect: Mood normal          Behavior: Behavior normal

## 2022-11-22 ENCOUNTER — ANNUAL EXAM (OUTPATIENT)
Dept: OBGYN CLINIC | Facility: MEDICAL CENTER | Age: 37
End: 2022-11-22

## 2022-11-22 VITALS
WEIGHT: 161 LBS | SYSTOLIC BLOOD PRESSURE: 118 MMHG | HEIGHT: 60 IN | BODY MASS INDEX: 31.61 KG/M2 | DIASTOLIC BLOOD PRESSURE: 88 MMHG

## 2022-11-22 DIAGNOSIS — Q52.5 LABIA MINORA AGGLUTINATION: ICD-10-CM

## 2022-11-22 DIAGNOSIS — Z01.419 ENCOUNTER FOR GYNECOLOGICAL EXAMINATION (GENERAL) (ROUTINE) WITHOUT ABNORMAL FINDINGS: Primary | ICD-10-CM

## 2022-11-22 DIAGNOSIS — Z12.4 ENCOUNTER FOR PAPANICOLAOU SMEAR FOR CERVICAL CANCER SCREENING: ICD-10-CM

## 2022-11-22 NOTE — PROGRESS NOTES
Patient presents for a routine annual visit  Last Pap Smear- 2017  LMP-10/27/22  Birth control-  vasectomy     Non smoker  Social drinker  Currently sexually active  No family history of uterine, ovarian, cervical or breast cancer     No concerns/questions for today's visit

## 2022-11-22 NOTE — PATIENT INSTRUCTIONS
Pap with high risk HPV testing every 5 years, if normal  Collected today  Sexually transmitted infection testing as indicated  Declined  Exercise most days of week-minimum of 150 minutes per week  Obtain appropriate diet and hydration  Calcium 1000mg + 600 vit D daily  HPV 9 vaccine recommended through age 39  Check with your insurance for coverage  If covered, call office to schedule start of vaccine series  Annual mammogram starting at age 36, monthly breast self exam  Seamus Sanders 20 times twice daily     Return to office for vulvar biopsy for vulvar changes

## 2022-11-23 LAB
HPV HR 12 DNA CVX QL NAA+PROBE: NEGATIVE
HPV16 DNA CVX QL NAA+PROBE: NEGATIVE
HPV18 DNA CVX QL NAA+PROBE: NEGATIVE

## 2022-11-25 LAB
CHEST PAIN STATEMENT: NORMAL
MAX DIASTOLIC BP: 78 MMHG
MAX HEART RATE: 184 BPM
MAX PREDICTED HEART RATE: 182 BPM
MAX. SYSTOLIC BP: 162 MMHG
PROTOCOL NAME: NORMAL
REASON FOR TERMINATION: NORMAL
TARGET HR FORMULA: NORMAL
TEST INDICATION: NORMAL
TIME IN EXERCISE PHASE: NORMAL

## 2022-12-01 LAB
LAB AP GYN PRIMARY INTERPRETATION: NORMAL
Lab: NORMAL
PATH INTERP SPEC-IMP: NORMAL

## 2022-12-05 NOTE — PROGRESS NOTES
Biopsy    Date/Time: 12/6/2022 11:52 AM  Performed by: ALEA Horne  Authorized by: ALEA Horne   Universal Protocol:  Consent: Verbal consent obtained  Written consent obtained  Risks and benefits: risks, benefits and alternatives were discussed  Consent given by: patient  Time out: Immediately prior to procedure a "time out" was called to verify the correct patient, procedure, equipment, support staff and site/side marked as required  Timeout called at: 12/6/2022 11:52 AM   Patient understanding: patient states understanding of the procedure being performed  Patient consent: the patient's understanding of the procedure matches consent given  Procedure consent: procedure consent matches procedure scheduled  Site marked: the operative site was marked  Patient identity confirmed: verbally with patient      Procedure Details - Lesion Biopsy:     Biopsy method: punch biopsy      Initial size (mm):  4    Final defect size (mm):  4    Destruction method comment:  2 vulvar punch biopsies-lower right labia 4mm and upper right mid labia 3mm     Nelson Gum presented for vulvar biopsy  Area was cleaned with betadine  Local anesthesia was achieved with injecting a small amount of 2% lidocaine with epinephrine x 2 separate sites->lower right labia and right upper mid labia  A Keye’s type sterile punch biopsy was obtained (4 mm lower and 3 mm upper) and submitted to pathology  Hemostasis was obtained with pressure and silver nitrate  Sintia tolerated procedure well  Biopsy care discussed (keep surgical site clean and dry)  Dry sterile dressing applied

## 2022-12-06 ENCOUNTER — PROCEDURE VISIT (OUTPATIENT)
Dept: OBGYN CLINIC | Facility: MEDICAL CENTER | Age: 37
End: 2022-12-06

## 2022-12-06 VITALS — WEIGHT: 163.6 LBS | DIASTOLIC BLOOD PRESSURE: 82 MMHG | SYSTOLIC BLOOD PRESSURE: 122 MMHG | BODY MASS INDEX: 31.95 KG/M2

## 2022-12-06 DIAGNOSIS — N90.89 DISCOLORATION OF VULVA: ICD-10-CM

## 2022-12-06 DIAGNOSIS — Q52.5 LABIA MINORA AGGLUTINATION: Primary | ICD-10-CM

## 2022-12-06 RX ORDER — AMOXICILLIN AND CLAVULANATE POTASSIUM 500; 125 MG/1; MG/1
1 TABLET, FILM COATED ORAL
COMMUNITY
Start: 2022-11-21

## 2022-12-06 NOTE — PATIENT INSTRUCTIONS
Use antibacterial soap to clean once daily  Can use antibiotic ointment to area twice daily  Call office with sudden increase in pain, bleeding, malodorous discharge  No sex until healed and avoid rubbing  Cornelia Francis instructed to call with signs or symptoms of infection and to follow up as instructed

## 2022-12-08 ENCOUNTER — OFFICE VISIT (OUTPATIENT)
Dept: URGENT CARE | Facility: MEDICAL CENTER | Age: 37
End: 2022-12-08

## 2022-12-08 VITALS
HEIGHT: 60 IN | RESPIRATION RATE: 20 BRPM | WEIGHT: 163 LBS | DIASTOLIC BLOOD PRESSURE: 82 MMHG | TEMPERATURE: 98 F | BODY MASS INDEX: 32 KG/M2 | SYSTOLIC BLOOD PRESSURE: 140 MMHG | HEART RATE: 105 BPM | OXYGEN SATURATION: 98 %

## 2022-12-08 DIAGNOSIS — F41.9 ANXIETY: Primary | ICD-10-CM

## 2022-12-08 LAB
ATRIAL RATE: 110 BPM
ATRIAL RATE: 110 BPM
P AXIS: 69 DEGREES
P AXIS: 69 DEGREES
PR INTERVAL: 150 MS
PR INTERVAL: 150 MS
QRS AXIS: 72 DEGREES
QRS AXIS: 72 DEGREES
QRSD INTERVAL: 70 MS
QRSD INTERVAL: 70 MS
QT INTERVAL: 314 MS
QT INTERVAL: 314 MS
QTC INTERVAL: 424 MS
QTC INTERVAL: 424 MS
T WAVE AXIS: 66 DEGREES
T WAVE AXIS: 66 DEGREES
VENTRICULAR RATE: 110 BPM
VENTRICULAR RATE: 110 BPM

## 2022-12-08 RX ORDER — HYDROXYZINE HYDROCHLORIDE 25 MG/1
25 TABLET, FILM COATED ORAL 3 TIMES DAILY
Qty: 15 TABLET | Refills: 0 | Status: SHIPPED | OUTPATIENT
Start: 2022-12-08 | End: 2022-12-13

## 2022-12-08 NOTE — PATIENT INSTRUCTIONS
Atarax prescribed for anxiety, take only as needed as may cause drowsiness  EKG in office shows no concern for cardiac abnormalities  Follow-up with PCP in 3-5 days  Report to ER if symptoms worsen

## 2022-12-08 NOTE — PROGRESS NOTES
[facility]        NAME: Tony Pierce is a 44 y o  female  : 1985    MRN: [id number]  DATE: 2022  TIME: 5:19 PM    Assessment and Plan   Anxiety [F41 9]  1  Anxiety          44year old with PMH significant for anxiety and panic attacks, currently on setraline for depression and reports having a counseling session this afternoon with therapist  Reports chest pain started this afternoon wants to make sure "heart is ok "     Patient Instructions     EKG in office not concerning for cardiac abnormalities  Atarax prescribed for anxiety, as needed  Take as directed  Follow up with PCP in 3-5 days  Proceed to  ER if symptoms worsen  Chief Complaint     Chief Complaint   Patient presents with   • Chest Pain     Patient states since 12:30 am she has had consistent chest pain on bilateral chest; 1/10 non-radiating; patient states she has had multiple ekgs/stress test completed due to panic attack/disorder with recurring chest pain  Patient states she had an intense therapy session yesterday and wants to make sure that it is just due to increased anxiety          History of Present Illness       Anxiety  Presents for initial visit  Onset was in the past 7 days  The problem has been unchanged  Symptoms include chest pain, nervous/anxious behavior and panic  Patient reports no confusion, palpitations, shortness of breath or suicidal ideas  Symptoms occur constantly  The severity of symptoms is moderate  The symptoms are aggravated by specific phobias  The quality of sleep is poor  Nighttime awakenings: occasional      There are no known risk factors  Her past medical history is significant for anxiety/panic attacks and depression  There is no history of arrhythmia, asthma, CAD, hyperthyroidism or suicide attempts  Past treatments include benzodiazephines, counseling (CBT) and SSRIs  The treatment provided mild relief  Compliance with prior treatments has been good         Review of Systems Review of Systems   Constitutional: Negative for activity change, appetite change and fatigue  Respiratory: Negative for chest tightness and shortness of breath  Cardiovascular: Positive for chest pain  Negative for palpitations  Psychiatric/Behavioral: Negative for confusion and suicidal ideas  The patient is nervous/anxious            Current Medications       Current Outpatient Medications:   •  lisinopril (ZESTRIL) 5 mg tablet, Take 5 mg by mouth daily, Disp: , Rfl:   •  sertraline (ZOLOFT) 50 mg tablet, Take by mouth, Disp: , Rfl:   •  amoxicillin-clavulanate (AUGMENTIN) 500-125 mg per tablet, Take 1 tablet by mouth 2 (two) times daily after meals (Patient not taking: Reported on 12/6/2022), Disp: , Rfl:   •  LORazepam (ATIVAN) 0 5 mg tablet, Take 0 5 mg by mouth 2 (two) times a day as needed (Patient not taking: Reported on 11/22/2022), Disp: , Rfl:   •  ondansetron (Zofran ODT) 4 mg disintegrating tablet, Take 1 tablet (4 mg total) by mouth every 6 (six) hours as needed for nausea or vomiting (Patient not taking: No sig reported), Disp: 20 tablet, Rfl: 0    Current Allergies     Allergies as of 12/08/2022 - Reviewed 12/08/2022   Allergen Reaction Noted   • Erythromycin Anaphylaxis and Rash 04/22/2012   • Pollen extract Sneezing 11/15/2013            The following portions of the patient's history were reviewed and updated as appropriate: allergies, current medications, past family history, past medical history, past social history, past surgical history and problem list      Past Medical History:   Diagnosis Date   • Bronchitis    • Gestational diabetes     gd   • Migraines     Last Assessed:11/15/13   • Normal delivery     2014 daughter   • Varicella     childhood   • Vulvar dystrophy     Last Assessed:9/19/17       Past Surgical History:   Procedure Laterality Date   • TOOTH EXTRACTION         Family History   Problem Relation Age of Onset   • Thyroid disease Mother    • Migraines Mother    • Hypertension Mother    • No Known Problems Father    • Asthma Brother    • Diabetes Maternal Grandmother    • Heart disease Maternal Grandmother    • Heart attack Maternal Grandmother    • Ovarian cancer Maternal Aunt    • Breast cancer Neg Hx    • Colon cancer Neg Hx    • Uterine cancer Neg Hx    • Cervical cancer Neg Hx          Medications have been verified  Objective   /82   Pulse 105   Temp 98 °F (36 7 °C) (Temporal)   Resp 20   Ht 5' (1 524 m)   Wt 73 9 kg (163 lb)   LMP 11/26/2022 (Exact Date)   SpO2 98%   BMI 31 83 kg/m²        Physical Exam     Physical Exam  Constitutional:       General: She is not in acute distress  Appearance: She is well-developed and normal weight  HENT:      Head: Normocephalic and atraumatic  Eyes:      Pupils: Pupils are equal, round, and reactive to light  Cardiovascular:      Rate and Rhythm: Regular rhythm  Tachycardia present  Heart sounds: Normal heart sounds  Pulmonary:      Effort: Pulmonary effort is normal       Breath sounds: Normal breath sounds  Abdominal:      General: Bowel sounds are normal       Palpations: Abdomen is soft  Musculoskeletal:         General: Normal range of motion  Cervical back: Normal range of motion and neck supple  Skin:     General: Skin is warm and dry  Capillary Refill: Capillary refill takes less than 2 seconds  Neurological:      Mental Status: She is alert and oriented to person, place, and time  Psychiatric:         Attention and Perception: Attention and perception normal          Mood and Affect: Mood is anxious and depressed  Speech: Speech is rapid and pressured  Behavior: Behavior is hyperactive  Behavior is not agitated  Behavior is cooperative  Thought Content: Thought content normal  Thought content does not include suicidal plan           Cognition and Memory: Cognition normal          Judgment: Judgment normal

## 2022-12-14 NOTE — PROGRESS NOTES
Assessment/Plan:  Eliminate triggers that cause the irritation  Wear loose underwear, keep nails short to avoid trauma from scratching  Avoid nighttime scratching  If unable to avoid nighttime scratching, call office for nighttime sedation  Any signs of infection return to office  Use clobetasol sparingly twice daily until skin texture normalizes which may take 2-4 months  Once normalized, use the medication three time weekly in the affected areas  Return to office monthly while using daily steroid ointment  Once controlled, return to office twice yearly  If left untreated, the risk of squamous cell carcinoma is up to 5%  1  Lichen sclerosus  -     clobetasol (TEMOVATE) 0 05 % ointment; Apply a pea sized amount to affected area twice daily until skin texture normalizes which may take 2-4 months, then apply 3 times weekly  Subjective:      Patient ID: Ricardo Tubbs is a 44 y o  female  HPI    Ricardo Tubbs is a 44 y o   female who is here today for a follow up visit  She had a vulvar biopsy on 22 for agglutination of labia minora, erythema noted in hearts line and raised white patch noted on right labia  Her tissue sample returned as lichen sclerosis  The following portions of the patient's history were reviewed and updated as appropriate: allergies, current medications, past medical history, past social history, past surgical history and problem list     Review of Systems   Constitutional: Negative  Genitourinary: Negative for genital sores  Skin: Negative  Hematological: Negative for adenopathy  Psychiatric/Behavioral: Negative  Objective:      /80 (BP Location: Left arm, Patient Position: Sitting, Cuff Size: Adult)   Ht 5' (1 524 m)   Wt 74 7 kg (164 lb 9 6 oz)   LMP 2022 (Exact Date)   BMI 32 15 kg/m²          Physical Exam  Vitals and nursing note reviewed  Constitutional:       Appearance: Normal appearance  Genitourinary:     General: Normal vulva  Exam position: Lithotomy position  Labia:         Right: Lesion present  No rash, tenderness or injury  Left: No rash, tenderness, lesion or injury  Urethra: No prolapse, urethral pain, urethral swelling or urethral lesion  Comments: Agglutination of labia minora, wax paper type appearance of area noted on diagram  White patch noted on right lower labia persists  Biopsy sites area completely healed  Lymphadenopathy:      Lower Body: No right inguinal adenopathy  No left inguinal adenopathy  Skin:     General: Skin is warm and dry  Neurological:      Mental Status: She is alert and oriented to person, place, and time     Psychiatric:         Mood and Affect: Mood normal          Behavior: Behavior normal

## 2022-12-15 ENCOUNTER — OFFICE VISIT (OUTPATIENT)
Dept: OBGYN CLINIC | Facility: MEDICAL CENTER | Age: 37
End: 2022-12-15

## 2022-12-15 VITALS
HEIGHT: 60 IN | SYSTOLIC BLOOD PRESSURE: 110 MMHG | BODY MASS INDEX: 32.31 KG/M2 | WEIGHT: 164.6 LBS | DIASTOLIC BLOOD PRESSURE: 80 MMHG

## 2022-12-15 DIAGNOSIS — L90.0 LICHEN SCLEROSUS: Primary | ICD-10-CM

## 2022-12-15 RX ORDER — CLOBETASOL PROPIONATE 0.5 MG/G
OINTMENT TOPICAL
Qty: 60 G | Refills: 0 | Status: SHIPPED | OUTPATIENT
Start: 2022-12-15

## 2022-12-15 NOTE — PATIENT INSTRUCTIONS
Eliminate triggers that cause the irritation  Wear loose underwear, keep nails short to avoid trauma from scratching  Avoid nighttime scratching  If unable to avoid nighttime scratching, call office for nighttime sedation  Any signs of infection return to office  Use clobetasol sparingly twice daily until skin texture normalizes which may take 2-4 months  Once normalized, use the medication three time weekly in the affected areas  Return to office monthly while using daily steroid ointment  Once controlled, return to office twice yearly  If left untreated, the risk of squamous cell carcinoma is up to 5%

## 2023-01-02 ENCOUNTER — OFFICE VISIT (OUTPATIENT)
Dept: URGENT CARE | Facility: MEDICAL CENTER | Age: 38
End: 2023-01-02

## 2023-01-02 VITALS
TEMPERATURE: 98 F | OXYGEN SATURATION: 100 % | HEIGHT: 60 IN | RESPIRATION RATE: 18 BRPM | WEIGHT: 164 LBS | BODY MASS INDEX: 32.2 KG/M2 | HEART RATE: 90 BPM

## 2023-01-02 DIAGNOSIS — J02.9 ACUTE PHARYNGITIS, UNSPECIFIED ETIOLOGY: Primary | ICD-10-CM

## 2023-01-02 LAB — S PYO AG THROAT QL: NEGATIVE

## 2023-01-02 NOTE — PROGRESS NOTES
[facility]        NAME: Ella Acuna is a 44 y o  female  : 1985    MRN: [id number]  DATE: 2023  TIME: 12:27 PM    Assessment and Plan   Acute pharyngitis, unspecified etiology [J02 9]  1  Acute pharyngitis, unspecified etiology  POCT rapid strepA    Throat culture            Patient Instructions   1  Over-the-counter ibuprofen and/or acetaminophen as needed for pain or fever  2  Gargle salt water as needed for sore throat pain relief  3  Increase oral fluids  4  Observe a liquid and/or soft diet until symptoms improve  5  Go to the ER immediately for any worsening symptoms, inability to swallow, shortness of breath, or any other ominous symptoms  Chief Complaint     Chief Complaint   Patient presents with   • Sore Throat     Sore throat, swollen glands, neck pain, painful swallowing          History of Present Illness       18-year-old female patient with a 1 to 2-day history of sore throat, painful swallowing  No significant nasal congestion, cough or any other symptoms  No fever no chills  Review of Systems   Review of Systems   Constitutional: Negative for chills and fever  HENT: Positive for sore throat  Negative for ear pain  Eyes: Negative for pain and visual disturbance  Respiratory: Negative for cough and shortness of breath  Cardiovascular: Negative for chest pain and palpitations  Gastrointestinal: Negative for abdominal pain and vomiting  Genitourinary: Negative for dysuria and hematuria  Musculoskeletal: Negative for arthralgias and back pain  Skin: Negative for color change and rash  Neurological: Negative for seizures and syncope  All other systems reviewed and are negative          Current Medications       Current Outpatient Medications:   •  amoxicillin-clavulanate (AUGMENTIN) 500-125 mg per tablet, Take 1 tablet by mouth 2 (two) times daily after meals (Patient not taking: Reported on 2022), Disp: , Rfl:   •  clobetasol (TEMOVATE) 0 05 % ointment, Apply a pea sized amount to affected area twice daily until skin texture normalizes which may take 2-4 months, then apply 3 times weekly  , Disp: 60 g, Rfl: 0  •  hydrOXYzine HCL (ATARAX) 25 mg tablet, Take 1 tablet (25 mg total) by mouth 3 (three) times a day for 5 days, Disp: 15 tablet, Rfl: 0  •  lisinopril (ZESTRIL) 5 mg tablet, Take 5 mg by mouth daily, Disp: , Rfl:   •  LORazepam (ATIVAN) 0 5 mg tablet, Take 0 5 mg by mouth 2 (two) times a day as needed (Patient not taking: Reported on 11/22/2022), Disp: , Rfl:   •  ondansetron (Zofran ODT) 4 mg disintegrating tablet, Take 1 tablet (4 mg total) by mouth every 6 (six) hours as needed for nausea or vomiting, Disp: 20 tablet, Rfl: 0  •  sertraline (ZOLOFT) 50 mg tablet, Take by mouth, Disp: , Rfl:     Current Allergies     Allergies as of 01/02/2023 - Reviewed 01/02/2023   Allergen Reaction Noted   • Erythromycin Anaphylaxis and Rash 04/22/2012   • Pollen extract Sneezing 11/15/2013            The following portions of the patient's history were reviewed and updated as appropriate: allergies, current medications, past family history, past medical history, past social history, past surgical history and problem list      Past Medical History:   Diagnosis Date   • Bronchitis    • Gestational diabetes     gd   • Migraines     Last Assessed:11/15/13   • Normal delivery     2014 daughter   • Varicella     childhood   • Vulvar dystrophy     Last Assessed:9/19/17       Past Surgical History:   Procedure Laterality Date   • TOOTH EXTRACTION         Family History   Problem Relation Age of Onset   • Thyroid disease Mother    • Migraines Mother    • Hypertension Mother    • No Known Problems Father    • Asthma Brother    • Diabetes Maternal Grandmother    • Heart disease Maternal Grandmother    • Heart attack Maternal Grandmother    • Ovarian cancer Maternal Aunt    • Breast cancer Neg Hx    • Colon cancer Neg Hx    • Uterine cancer Neg Hx    • Cervical cancer Neg Hx          Medications have been verified  Objective   Pulse 90   Temp 98 °F (36 7 °C) (Temporal)   Resp 18   Ht 5' (1 524 m)   Wt 74 4 kg (164 lb)   SpO2 100%   BMI 32 03 kg/m²        Physical Exam     Physical Exam  Vitals and nursing note reviewed  Constitutional:       General: She is not in acute distress  Appearance: Normal appearance  She is not ill-appearing or toxic-appearing  HENT:      Head: Normocephalic  Right Ear: Tympanic membrane normal       Left Ear: Tympanic membrane normal       Nose: No congestion  Mouth/Throat:      Mouth: Mucous membranes are moist       Pharynx: Posterior oropharyngeal erythema present  No pharyngeal swelling  Tonsils: No tonsillar exudate  1+ on the right  1+ on the left  Eyes:      Extraocular Movements:      Right eye: Normal extraocular motion  Left eye: Normal extraocular motion  Conjunctiva/sclera: Conjunctivae normal       Pupils: Pupils are equal, round, and reactive to light  Cardiovascular:      Rate and Rhythm: Normal rate and regular rhythm  Pulses: Normal pulses  Heart sounds: Normal heart sounds  Pulmonary:      Effort: Pulmonary effort is normal       Breath sounds: Normal breath sounds  Musculoskeletal:      Cervical back: Normal range of motion  Skin:     General: Skin is warm and dry  Capillary Refill: Capillary refill takes less than 2 seconds  Neurological:      Mental Status: She is alert and oriented to person, place, and time     Psychiatric:         Mood and Affect: Mood normal          Behavior: Behavior normal

## 2023-01-04 LAB — BACTERIA THROAT CULT: NORMAL

## 2023-01-06 ENCOUNTER — OFFICE VISIT (OUTPATIENT)
Dept: URGENT CARE | Facility: MEDICAL CENTER | Age: 38
End: 2023-01-06

## 2023-01-06 VITALS
SYSTOLIC BLOOD PRESSURE: 145 MMHG | RESPIRATION RATE: 18 BRPM | BODY MASS INDEX: 26.99 KG/M2 | WEIGHT: 162 LBS | DIASTOLIC BLOOD PRESSURE: 72 MMHG | OXYGEN SATURATION: 97 % | HEART RATE: 114 BPM | TEMPERATURE: 97.7 F | HEIGHT: 65 IN

## 2023-01-06 DIAGNOSIS — H65.01 NON-RECURRENT ACUTE SEROUS OTITIS MEDIA OF RIGHT EAR: ICD-10-CM

## 2023-01-06 DIAGNOSIS — J01.10 ACUTE NON-RECURRENT FRONTAL SINUSITIS: Primary | ICD-10-CM

## 2023-01-06 RX ORDER — AMOXICILLIN AND CLAVULANATE POTASSIUM 875; 125 MG/1; MG/1
1 TABLET, FILM COATED ORAL EVERY 12 HOURS SCHEDULED
Qty: 20 TABLET | Refills: 0 | Status: SHIPPED | OUTPATIENT
Start: 2023-01-06 | End: 2023-01-16

## 2023-01-06 RX ORDER — SUMATRIPTAN 50 MG/1
TABLET, FILM COATED ORAL
COMMUNITY
Start: 2023-01-05

## 2023-01-06 NOTE — PROGRESS NOTES
[facility]        NAME: Linn Buerger is a 44 y o  female  : 1985    MRN: [id number]  DATE: 2023  TIME: 1:50 PM    Assessment and Plan   Acute non-recurrent frontal sinusitis [J01 10]  1  Acute non-recurrent frontal sinusitis  amoxicillin-clavulanate (AUGMENTIN) 875-125 mg per tablet      2  Non-recurrent acute serous otitis media of right ear  amoxicillin-clavulanate (AUGMENTIN) 875-125 mg per tablet            Patient Instructions     Take Augmentin as prescribed  Warm compresses to face  Take Sudafed as needed  Follow up with PCP in 3 days if no better  Proceed to  ER if symptoms worsen  Chief Complaint     Chief Complaint   Patient presents with   • Conjunctivitis     Today patient woke today with left eye redness, drainage and irritation  States she feels pressure in both ears and feels her glands swollen  Stated she was seen for a viral infection recently  History of Present Illness       Patient was seen at [facility] on 23 for sore throat and malaise  TC done which was negative  seh now has frontal headache, bilateral ear pain and pressure, and malaise  Today she had a little green discharge from her left eye  Taking Dayquil and Nyquil  No cough   No fever or chills  Has a history of  sinusitis  Review of Systems   Review of Systems   Respiratory: Negative for cough  Current Medications       Current Outpatient Medications:   •  amoxicillin-clavulanate (AUGMENTIN) 875-125 mg per tablet, Take 1 tablet by mouth every 12 (twelve) hours for 10 days, Disp: 20 tablet, Rfl: 0  •  clobetasol (TEMOVATE) 0 05 % ointment, Apply a pea sized amount to affected area twice daily until skin texture normalizes which may take 2-4 months, then apply 3 times weekly  , Disp: 60 g, Rfl: 0  •  lisinopril (ZESTRIL) 5 mg tablet, Take 5 mg by mouth daily, Disp: , Rfl:   •  sertraline (ZOLOFT) 50 mg tablet, Take by mouth, Disp: , Rfl:   •  SUMAtriptan (IMITREX) 50 mg tablet, , Disp: , Rfl:   •  amoxicillin-clavulanate (AUGMENTIN) 500-125 mg per tablet, Take 1 tablet by mouth 2 (two) times daily after meals (Patient not taking: Reported on 12/6/2022), Disp: , Rfl:   •  hydrOXYzine HCL (ATARAX) 25 mg tablet, Take 1 tablet (25 mg total) by mouth 3 (three) times a day for 5 days, Disp: 15 tablet, Rfl: 0  •  LORazepam (ATIVAN) 0 5 mg tablet, Take 0 5 mg by mouth 2 (two) times a day as needed (Patient not taking: Reported on 11/22/2022), Disp: , Rfl:   •  ondansetron (Zofran ODT) 4 mg disintegrating tablet, Take 1 tablet (4 mg total) by mouth every 6 (six) hours as needed for nausea or vomiting, Disp: 20 tablet, Rfl: 0    Current Allergies     Allergies as of 01/06/2023 - Reviewed 01/06/2023   Allergen Reaction Noted   • Erythromycin Anaphylaxis and Rash 04/22/2012   • Pollen extract Sneezing 11/15/2013            The following portions of the patient's history were reviewed and updated as appropriate: allergies, current medications, past family history, past medical history, past social history, past surgical history and problem list      Past Medical History:   Diagnosis Date   • Bronchitis    • Gestational diabetes     gd   • Migraines     Last Assessed:11/15/13   • Normal delivery     2014 daughter   • Varicella     childhood   • Vulvar dystrophy     Last Assessed:9/19/17       Past Surgical History:   Procedure Laterality Date   • TOOTH EXTRACTION         Family History   Problem Relation Age of Onset   • Thyroid disease Mother    • Migraines Mother    • Hypertension Mother    • No Known Problems Father    • Asthma Brother    • Diabetes Maternal Grandmother    • Heart disease Maternal Grandmother    • Heart attack Maternal Grandmother    • Ovarian cancer Maternal Aunt    • Breast cancer Neg Hx    • Colon cancer Neg Hx    • Uterine cancer Neg Hx    • Cervical cancer Neg Hx          Medications have been verified          Objective   /72   Pulse (!) 114   Temp 97 7 °F (36 5 °C) (Temporal)   Resp 18   Ht 5' 5" (1 651 m)   Wt 73 5 kg (162 lb)   SpO2 97%   BMI 26 96 kg/m²        Physical Exam     Physical Exam  Constitutional:       Appearance: Normal appearance  She is not ill-appearing  HENT:      Head:      Comments: Sinuses nontender  Right Ear: Ear canal normal       Left Ear: Tympanic membrane and ear canal normal       Ears:      Comments: Right TM restracted, dull  Nose: No rhinorrhea  Mouth/Throat:      Pharynx: Posterior oropharyngeal erythema present  No oropharyngeal exudate  Eyes:      General:         Right eye: No discharge  Left eye: No discharge  Comments: Left eye with mild conjuntival injection  Cardiovascular:      Heart sounds: Normal heart sounds  Pulmonary:      Breath sounds: Normal breath sounds  Neurological:      Mental Status: She is alert

## 2023-01-08 ENCOUNTER — OFFICE VISIT (OUTPATIENT)
Dept: URGENT CARE | Facility: MEDICAL CENTER | Age: 38
End: 2023-01-08

## 2023-01-08 VITALS
OXYGEN SATURATION: 99 % | BODY MASS INDEX: 32 KG/M2 | TEMPERATURE: 97.9 F | DIASTOLIC BLOOD PRESSURE: 84 MMHG | SYSTOLIC BLOOD PRESSURE: 129 MMHG | HEART RATE: 103 BPM | WEIGHT: 163 LBS | HEIGHT: 60 IN | RESPIRATION RATE: 18 BRPM

## 2023-01-08 DIAGNOSIS — H10.023 MUCOPURULENT CONJUNCTIVITIS OF BOTH EYES: Primary | ICD-10-CM

## 2023-01-08 RX ORDER — CIPROFLOXACIN HYDROCHLORIDE 3.5 MG/ML
1 SOLUTION/ DROPS TOPICAL 4 TIMES DAILY
Qty: 5 ML | Refills: 0 | Status: SHIPPED | OUTPATIENT
Start: 2023-01-08 | End: 2023-01-15

## 2023-01-08 NOTE — PATIENT INSTRUCTIONS
1  Over-the-counter ibuprofen and/or acetaminophen as needed for pain or fever  2  Apply warm compresses to both eyes as needed for discomfort  3  Go to the ER immediately for any significantly worsening symptoms  4   Continue all other medications prescribed at your visit 2 days ago  5   Follow-up with PCP or  the eye specialist for any persistent symptoms after 7 days

## 2023-01-08 NOTE — PROGRESS NOTES
[facility]        NAME: Kristal Wilson is a 44 y o  female  : 1985    MRN: [id number]  DATE: 2023  TIME: 9:44 AM    Assessment and Plan   Mucopurulent conjunctivitis of both eyes [H10 023]  1  Mucopurulent conjunctivitis of both eyes  ciprofloxacin (CILOXAN) 0 3 % ophthalmic solution            Patient Instructions     1  Over-the-counter ibuprofen and/or acetaminophen as needed for pain or fever  2  Apply warm compresses to both eyes as needed for discomfort  3  Go to the ER immediately for any significantly worsening symptoms  4   Continue all other medications prescribed at your visit 2 days ago  5   Follow-up with PCP or  the eye specialist for any persistent symptoms after 7 days  Chief Complaint     Chief Complaint   Patient presents with   • Conjunctivitis     Pt seen on  for sinus infection, pt states since Friday her eyes have become red and having crusty discharge x3 days         History of Present Illness       28-year-old female patient with a 1 day history of bilateral significant eye redness, irritation, and as of this morning bilateral purulent discharge  She denies any visual changes, photophobia, blurred vision  She was seen here 2 days ago and treated with Augmentin for a sinus infection  Those symptoms seem to be improving  Review of Systems   Review of Systems   Constitutional: Negative for chills and fever  HENT: Positive for congestion and sinus pain  Negative for ear pain and sore throat  Eyes: Positive for pain, discharge and redness  Negative for photophobia, itching and visual disturbance  Respiratory: Negative for cough and shortness of breath  Cardiovascular: Negative for chest pain and palpitations  Gastrointestinal: Negative for abdominal pain and vomiting  Genitourinary: Negative for dysuria and hematuria  Musculoskeletal: Negative for arthralgias and back pain  Skin: Negative for color change and rash  Neurological: Negative for seizures and syncope  All other systems reviewed and are negative  Current Medications       Current Outpatient Medications:   •  amoxicillin-clavulanate (AUGMENTIN) 875-125 mg per tablet, Take 1 tablet by mouth every 12 (twelve) hours for 10 days, Disp: 20 tablet, Rfl: 0  •  ciprofloxacin (CILOXAN) 0 3 % ophthalmic solution, Administer 1 drop to both eyes 4 (four) times a day for 7 days, Disp: 5 mL, Rfl: 0  •  clobetasol (TEMOVATE) 0 05 % ointment, Apply a pea sized amount to affected area twice daily until skin texture normalizes which may take 2-4 months, then apply 3 times weekly  , Disp: 60 g, Rfl: 0  •  lisinopril (ZESTRIL) 5 mg tablet, Take 5 mg by mouth daily, Disp: , Rfl:   •  sertraline (ZOLOFT) 50 mg tablet, Take by mouth, Disp: , Rfl:   •  SUMAtriptan (IMITREX) 50 mg tablet, , Disp: , Rfl:   •  amoxicillin-clavulanate (AUGMENTIN) 500-125 mg per tablet, Take 1 tablet by mouth 2 (two) times daily after meals (Patient not taking: Reported on 12/6/2022), Disp: , Rfl:   •  hydrOXYzine HCL (ATARAX) 25 mg tablet, Take 1 tablet (25 mg total) by mouth 3 (three) times a day for 5 days, Disp: 15 tablet, Rfl: 0  •  LORazepam (ATIVAN) 0 5 mg tablet, Take 0 5 mg by mouth 2 (two) times a day as needed (Patient not taking: Reported on 11/22/2022), Disp: , Rfl:   •  ondansetron (Zofran ODT) 4 mg disintegrating tablet, Take 1 tablet (4 mg total) by mouth every 6 (six) hours as needed for nausea or vomiting, Disp: 20 tablet, Rfl: 0    Current Allergies     Allergies as of 01/08/2023 - Reviewed 01/08/2023   Allergen Reaction Noted   • Erythromycin Anaphylaxis and Rash 04/22/2012   • Pollen extract Sneezing 11/15/2013            The following portions of the patient's history were reviewed and updated as appropriate: allergies, current medications, past family history, past medical history, past social history, past surgical history and problem list      Past Medical History:   Diagnosis Date   • Bronchitis    • Gestational diabetes     gd   • Migraines     Last Assessed:11/15/13   • Normal delivery     2014 daughter   • Varicella     childhood   • Vulvar dystrophy     Last Assessed:9/19/17       Past Surgical History:   Procedure Laterality Date   • TOOTH EXTRACTION         Family History   Problem Relation Age of Onset   • Thyroid disease Mother    • Migraines Mother    • Hypertension Mother    • No Known Problems Father    • Asthma Brother    • Diabetes Maternal Grandmother    • Heart disease Maternal Grandmother    • Heart attack Maternal Grandmother    • Ovarian cancer Maternal Aunt    • Breast cancer Neg Hx    • Colon cancer Neg Hx    • Uterine cancer Neg Hx    • Cervical cancer Neg Hx          Medications have been verified  Objective   /84 (BP Location: Left arm)   Pulse 103   Temp 97 9 °F (36 6 °C) (Temporal)   Resp 18   Ht 5' (1 524 m)   Wt 73 9 kg (163 lb)   LMP 12/21/2022   SpO2 99%   BMI 31 83 kg/m²        Physical Exam     Physical Exam  Constitutional:       General: She is not in acute distress  Appearance: Normal appearance  She is not ill-appearing  HENT:      Head: Normocephalic  Right Ear: Tympanic membrane normal       Left Ear: Tympanic membrane normal       Nose: Congestion present  No rhinorrhea  Mouth/Throat:      Mouth: Mucous membranes are moist       Pharynx: Oropharynx is clear  No oropharyngeal exudate or posterior oropharyngeal erythema  Eyes:      Pupils: Pupils are equal, round, and reactive to light  Comments: Moderate bilateral conjunctival injection, no chemosis  Mild to moderate bilateral purulent discharge, yellow-green in color, noted  Cardiovascular:      Rate and Rhythm: Normal rate  Pulmonary:      Effort: Pulmonary effort is normal    Musculoskeletal:      Cervical back: Normal range of motion  Neurological:      Mental Status: She is alert

## 2023-01-18 PROBLEM — L90.0 LICHEN SCLEROSUS: Status: ACTIVE | Noted: 2023-01-18

## 2023-01-19 ENCOUNTER — OFFICE VISIT (OUTPATIENT)
Dept: OBGYN CLINIC | Facility: MEDICAL CENTER | Age: 38
End: 2023-01-19

## 2023-01-19 VITALS — BODY MASS INDEX: 31.44 KG/M2 | SYSTOLIC BLOOD PRESSURE: 128 MMHG | WEIGHT: 161 LBS | DIASTOLIC BLOOD PRESSURE: 88 MMHG

## 2023-01-19 DIAGNOSIS — L90.0 LICHEN SCLEROSUS: Primary | ICD-10-CM

## 2023-01-19 RX ORDER — PREDNISONE 10 MG/1
TABLET ORAL
COMMUNITY
Start: 2023-01-19

## 2023-01-19 RX ORDER — AMOXICILLIN AND CLAVULANATE POTASSIUM 875; 125 MG/1; MG/1
TABLET, FILM COATED ORAL
COMMUNITY
Start: 2023-01-19

## 2023-01-19 NOTE — PROGRESS NOTES
Assessment/Plan:  Eliminate triggers that cause the irritation  Wear loose underwear, keep nails short to avoid trauma from scratching  Avoid nighttime scratching  If unable to avoid nighttime scratching, call office for nighttime sedation  Any signs of infection return to office  Clobetasol rx sent to pharmacy on file  Use clobetasol sparingly twice daily until skin texture normalizes which may take 2-4 months  Once normalized, use the medication three time weekly in the affected areas  Return to office monthly while using daily steroid ointment  Once controlled, return to office twice yearly  If left untreated, the risk of squamous cell carcinoma is up to 5%  1  Lichen sclerosus               Subjective:      Patient ID: Shefali Lakhani is a 44 y o  female  HPI    Shefali Lakhani is a 44 y o   female who is here today for a follow up visit  She had a vulvar biopsy on 22 for agglutination of labia minora, erythema noted in hearts line and raised white patch noted on right labia  Her tissue sample returned as lichen sclerosis  She was last evaluated on 12/15/22 with the recommendation to return to the office monthly while she is using clobetasol twice daily until her skin normalizes  No vulvar complaints today  Using clobetasol as directed  The following portions of the patient's history were reviewed and updated as appropriate: allergies, current medications, past medical history, past social history, past surgical history and problem list     Review of Systems   Constitutional: Negative  Gastrointestinal: Negative for abdominal pain, constipation, diarrhea, nausea and vomiting  Genitourinary: Negative for decreased urine volume, difficulty urinating, dyspareunia, dysuria, frequency, genital sores, menstrual problem, pelvic pain, urgency, vaginal bleeding, vaginal discharge and vaginal pain  Musculoskeletal: Negative for arthralgias and myalgias     Skin: Negative  Hematological: Negative for adenopathy  Psychiatric/Behavioral: Negative  All other systems reviewed and are negative  Objective:      /88 (BP Location: Left arm, Patient Position: Sitting, Cuff Size: Standard)   Wt 73 kg (161 lb)   LMP 01/16/2023 (Exact Date)   BMI 31 44 kg/m²          Physical Exam  Vitals and nursing note reviewed  Constitutional:       Appearance: Normal appearance  She is well-developed  Genitourinary:     General: Normal vulva  Exam position: Lithotomy position  Labia:         Right: No rash, tenderness, lesion or injury  Left: No rash, tenderness, lesion or injury  Urethra: No prolapse, urethral pain, urethral swelling or urethral lesion  Rectum: No external hemorrhoid  Comments: Agglutination of labia minora, wax paper type appearance of area noted on diagram  White patch noted on right lower labia persists  Biopsy sites area completely healed  Musculoskeletal:         General: Normal range of motion  Lymphadenopathy:      Lower Body: No right inguinal adenopathy  No left inguinal adenopathy  Skin:     General: Skin is warm and dry  Neurological:      Mental Status: She is alert and oriented to person, place, and time     Psychiatric:         Mood and Affect: Mood normal          Behavior: Behavior normal

## 2023-02-10 ENCOUNTER — APPOINTMENT (OUTPATIENT)
Dept: LAB | Facility: MEDICAL CENTER | Age: 38
End: 2023-02-10

## 2023-02-10 DIAGNOSIS — Z13.228 SCREENING FOR PHENYLKETONURIA (PKU): ICD-10-CM

## 2023-02-10 DIAGNOSIS — R94.6 NONSPECIFIC ABNORMAL RESULTS OF THYROID FUNCTION STUDY: ICD-10-CM

## 2023-02-10 DIAGNOSIS — A69.20 LYME DISEASE: ICD-10-CM

## 2023-02-10 DIAGNOSIS — E55.9 VITAMIN D DEFICIENCY: ICD-10-CM

## 2023-02-10 DIAGNOSIS — Z13.0 SCREENING FOR IRON DEFICIENCY ANEMIA: ICD-10-CM

## 2023-02-10 DIAGNOSIS — Z13.21 SCREENING FOR MALNUTRITION: ICD-10-CM

## 2023-02-10 LAB
ALBUMIN SERPL BCP-MCNC: 4.1 G/DL (ref 3.5–5)
ALP SERPL-CCNC: 58 U/L (ref 46–116)
ALT SERPL W P-5'-P-CCNC: 24 U/L (ref 12–78)
ANION GAP SERPL CALCULATED.3IONS-SCNC: 6 MMOL/L (ref 4–13)
AST SERPL W P-5'-P-CCNC: 19 U/L (ref 5–45)
BILIRUB SERPL-MCNC: 0.9 MG/DL (ref 0.2–1)
BUN SERPL-MCNC: 14 MG/DL (ref 5–25)
CALCIUM SERPL-MCNC: 9.4 MG/DL (ref 8.3–10.1)
CHLORIDE SERPL-SCNC: 106 MMOL/L (ref 96–108)
CHOLEST SERPL-MCNC: 151 MG/DL
CO2 SERPL-SCNC: 25 MMOL/L (ref 21–32)
CREAT SERPL-MCNC: 0.71 MG/DL (ref 0.6–1.3)
GFR SERPL CREATININE-BSD FRML MDRD: 108 ML/MIN/1.73SQ M
GLUCOSE P FAST SERPL-MCNC: 86 MG/DL (ref 65–99)
HDLC SERPL-MCNC: 46 MG/DL
LDLC SERPL CALC-MCNC: 87 MG/DL (ref 0–100)
NONHDLC SERPL-MCNC: 105 MG/DL
POTASSIUM SERPL-SCNC: 4 MMOL/L (ref 3.5–5.3)
PROT SERPL-MCNC: 7.4 G/DL (ref 6.4–8.4)
SODIUM SERPL-SCNC: 137 MMOL/L (ref 135–147)
TRIGL SERPL-MCNC: 88 MG/DL

## 2023-02-21 ENCOUNTER — OFFICE VISIT (OUTPATIENT)
Dept: OBGYN CLINIC | Facility: MEDICAL CENTER | Age: 38
End: 2023-02-21

## 2023-02-21 VITALS — DIASTOLIC BLOOD PRESSURE: 82 MMHG | BODY MASS INDEX: 32.54 KG/M2 | SYSTOLIC BLOOD PRESSURE: 112 MMHG | WEIGHT: 166.6 LBS

## 2023-02-21 DIAGNOSIS — L90.0 LICHEN SCLEROSUS: Primary | ICD-10-CM

## 2023-02-21 NOTE — PATIENT INSTRUCTIONS
Continue to use clobetasol sparingly twice daily until skin texture normalizes  This may take another 1-2 months  Return to office in one month for vulvar evaluation  Eliminate triggers that cause the irritation  Wear loose underwear, keep nails short to avoid trauma from scratching  Avoid nighttime scratching  If unable to avoid nighttime scratching, call office for nighttime sedation  Any signs of infection return to office  Declined need for refill  If left untreated, the risk of squamous cell carcinoma is up to 5%

## 2023-02-21 NOTE — PROGRESS NOTES
Assessment/Plan:  Continue to use clobetasol sparingly twice daily until skin texture normalizes  This may take another 1-2 months  Return to office in one month for vulvar evaluation  Eliminate triggers that cause the irritation  Wear loose underwear, keep nails short to avoid trauma from scratching  Avoid nighttime scratching  If unable to avoid nighttime scratching, call office for nighttime sedation  Any signs of infection return to office  Declined need for refill  If left untreated, the risk of squamous cell carcinoma is up to 5%  1  Lichen sclerosus               Subjective:      Patient ID: Winston Bustamante is a 44 y o  female  HPI    Winston Bustamante is a 44 y o  [id number] female who is here today for a follow up visit  She had a vulvar biopsy on 12/6/22 for agglutination of labia minora, erythema noted in hearts line and raised white patch noted on right labia  Her tissue sample returned as lichen sclerosis  She was last evaluated on 1/19/23 with the recommendation to return to the office monthly while she is using clobetasol twice daily until her skin normalizes  No vulvar complaints today  Using clobetasol as directed  The following portions of the patient's history were reviewed and updated as appropriate: allergies, current medications, past medical history, past social history, past surgical history and problem list     Review of Systems   Constitutional: Negative  Genitourinary: Negative for genital sores  Hematological: Negative for adenopathy  Psychiatric/Behavioral: Negative  Objective:      /82 (BP Location: Left arm, Patient Position: Sitting, Cuff Size: Standard)   Wt 75 6 kg (166 lb 9 6 oz)   LMP 02/13/2023 (Exact Date)   BMI 32 54 kg/m²          Physical Exam  Vitals and nursing note reviewed  Constitutional:       Appearance: Normal appearance     Genitourinary:         Comments: Slight crinkled paper type appearance to labia minora and at clitoral huizar as noted on diagram  White patch on right lower labia has lessened in size and appears less thick  Skin:     General: Skin is warm and dry  Neurological:      Mental Status: She is alert and oriented to person, place, and time     Psychiatric:         Mood and Affect: Mood normal          Behavior: Behavior normal

## 2023-03-21 ENCOUNTER — OFFICE VISIT (OUTPATIENT)
Dept: OBGYN CLINIC | Facility: MEDICAL CENTER | Age: 38
End: 2023-03-21

## 2023-03-21 VITALS — SYSTOLIC BLOOD PRESSURE: 118 MMHG | DIASTOLIC BLOOD PRESSURE: 84 MMHG | WEIGHT: 170 LBS | BODY MASS INDEX: 33.2 KG/M2

## 2023-03-21 DIAGNOSIS — L90.0 LICHEN SCLEROSUS: Primary | ICD-10-CM

## 2023-03-21 NOTE — PROGRESS NOTES
Assessment/Plan:  Eliminate triggers that cause the irritation  Wear loose underwear, keep nails short to avoid trauma from scratching  Avoid nighttime scratching  If unable to avoid nighttime scratching, call office for nighttime sedation  Any signs of infection return to office  Denies need for clobetasol refill  Will call when needed  Tissue has normalized and may use the medication three time weekly in the affected areas  If the LS flares, restart clobetasol use sparingly twice daily until skin texture normalizes which may take 2-4 months  Return to office monthly while using daily steroid ointment  Once controlled, return to office twice yearly  If left untreated, the risk of squamous cell carcinoma is up to 5%  Will return for annual visit   1  Lichen sclerosus           Subjective:      Patient ID: Almeda Frankel is a 44 y o  female  HPI    Almeda Frankel is a 44 y o   female who is here today for a follow up visit  No health concerns  Last here on 23 for for LS monthly follow up  She had a vulvar biopsy on 22 for agglutination of labia minora, erythema noted in hearts line and raised white patch noted on right labia  No vulvar complaints today  Using clobetasol as directed      The following portions of the patient's history were reviewed and updated as appropriate: allergies, current medications, past medical history, past social history, past surgical history and problem list     Review of Systems   Constitutional: Negative  Genitourinary: Negative for dyspareunia and genital sores  Musculoskeletal: Negative  Skin: Negative  Hematological: Negative for adenopathy  Objective:      /84 (BP Location: Left arm, Patient Position: Sitting, Cuff Size: Standard)   Wt 77 1 kg (170 lb)   LMP 2023 (Exact Date)   BMI 33 20 kg/m²          Physical Exam  Vitals and nursing note reviewed     Constitutional:       Appearance: Normal appearance  She is well-developed  Genitourinary:     Exam position: Lithotomy position  Labia:         Right: No rash, tenderness, lesion or injury  Left: No rash, tenderness, lesion or injury  Urethra: No prolapse, urethral pain, urethral swelling or urethral lesion  Comments: Slight hypopigmentation of area around clitoral huizar  Less waxy paper type appearance  Previous white plaque on inner right lower labia minora is flat and with normalizing pigment  Lymphadenopathy:      Lower Body: No right inguinal adenopathy  No left inguinal adenopathy  Skin:     General: Skin is warm and dry  Neurological:      Mental Status: She is alert and oriented to person, place, and time     Psychiatric:         Mood and Affect: Mood normal          Behavior: Behavior normal

## 2023-03-21 NOTE — PATIENT INSTRUCTIONS
Eliminate triggers that cause the irritation  Wear loose underwear, keep nails short to avoid trauma from scratching  Avoid nighttime scratching  If unable to avoid nighttime scratching, call office for nighttime sedation  Any signs of infection return to office  Denies need for clobetasol refill  Will call when needed  Tissue has normalized and may use the medication three time weekly in the affected areas  If the LS flares, restart clobetasol use sparingly twice daily until skin texture normalizes which may take 2-4 months  Return to office monthly while using daily steroid ointment  Once controlled, return to office twice yearly  If left untreated, the risk of squamous cell carcinoma is up to 5%  Will return for annual visit 11/23

## 2023-06-06 ENCOUNTER — OFFICE VISIT (OUTPATIENT)
Dept: DERMATOLOGY | Facility: CLINIC | Age: 38
End: 2023-06-06
Payer: COMMERCIAL

## 2023-06-06 ENCOUNTER — TELEPHONE (OUTPATIENT)
Dept: DERMATOLOGY | Facility: CLINIC | Age: 38
End: 2023-06-06

## 2023-06-06 VITALS — WEIGHT: 167 LBS | BODY MASS INDEX: 32.79 KG/M2 | HEIGHT: 60 IN | TEMPERATURE: 97.8 F

## 2023-06-06 DIAGNOSIS — I78.1 TELANGIECTASIA: ICD-10-CM

## 2023-06-06 DIAGNOSIS — D23.9 DERMATOFIBROMA: ICD-10-CM

## 2023-06-06 DIAGNOSIS — L21.9 SEBORRHEIC DERMATITIS: Primary | ICD-10-CM

## 2023-06-06 DIAGNOSIS — L71.9 ROSACEA: ICD-10-CM

## 2023-06-06 PROCEDURE — 99204 OFFICE O/P NEW MOD 45 MIN: CPT | Performed by: DERMATOLOGY

## 2023-06-06 RX ORDER — METRONIDAZOLE 7.5 MG/G
GEL TOPICAL 2 TIMES DAILY
Qty: 45 G | Refills: 11 | Status: SHIPPED | OUTPATIENT
Start: 2023-06-06

## 2023-06-06 RX ORDER — CICLOPIROX 1 G/100ML
SHAMPOO TOPICAL
Qty: 120 ML | Refills: 11 | Status: SHIPPED | OUTPATIENT
Start: 2023-06-06

## 2023-06-06 RX ORDER — TRIAMCINOLONE ACETONIDE 5 MG/G
1 CREAM TOPICAL 2 TIMES DAILY
COMMUNITY
Start: 2023-04-28

## 2023-06-06 RX ORDER — FLUOCINOLONE ACETONIDE 0.1 MG/ML
SOLUTION TOPICAL 2 TIMES DAILY
Qty: 60 ML | Refills: 3 | Status: SHIPPED | OUTPATIENT
Start: 2023-06-06

## 2023-06-06 NOTE — TELEPHONE ENCOUNTER
Called patients insurance and spoke with Bethany Warren in regards to seeing if the patient needs a prior authorization for the excimer laser he stated she does not need a prior auth reference number [id number]

## 2023-06-06 NOTE — PROGRESS NOTES
"[facility]     Patient Name: Rob Mendoza  Encounter Date: 06/06/2023     Have you been cared for by a [place] [id number] Dermatologist in the last 3 years and, if so, which description applies to you? NO  I am considered a \"new\" patient and must complete all patient intake questions  I am FEMALE/of child-bearing potential     REVIEW OF SYSTEMS:  Have you recently had or currently have any of the following? · Recent fever or chills? No  · Any non-healing wound? No  · Are you pregnant or planning to become pregnant? No  · Are you currently or planning to be nursing or breast feeding? No   PAST MEDICAL HISTORY:  Have you personally ever had or currently have any of the following? If \"YES,\" then please provide more detail  · Skin cancer (such as Melanoma, Basal Cell Carcinoma, Squamous Cell Carcinoma? No  · Tuberculosis, HIV/AIDS, Hepatitis B or C: No  · Systemic Immunosuppression such as Diabetes, Biologic or Immunotherapy, Chemotherapy, Organ Transplantation, Bone Marrow Transplantation No  · Radiation Treatment No   FAMILY HISTORY:  Any \"first degree relatives\" (parent, brother, sister, or child) with the following? • Skin Cancer, Pancreatic or Other Cancer? No   PATIENT EXPERIENCE:    • Do you want the Dermatologist to perform a COMPLETE skin exam today including a clinical examination under the \"bra and underwear\" areas? NO  • If necessary, do we have your permission to call and leave a detailed message on your Preferred Phone number that includes your specific medical information? Yes      Allergies   Allergen Reactions   • Erythromycin Anaphylaxis and Rash     Reaction Date: [id number];    • Pollen Extract Sneezing      Current Outpatient Medications:   •  clobetasol (TEMOVATE) 0 05 % ointment, Apply a pea sized amount to affected area twice daily until skin texture normalizes which may take 2-4 months, then apply 3 times weekly  , Disp: 60 g, Rfl: 0  •  lisinopril (ZESTRIL) 5 mg " tablet, Take 5 mg by mouth daily, Disp: , Rfl:   •  LORazepam (ATIVAN) 0 5 mg tablet, Take 0 5 mg by mouth 2 (two) times a day as needed, Disp: , Rfl:   •  sertraline (ZOLOFT) 50 mg tablet, Take by mouth, Disp: , Rfl:   •  SUMAtriptan (IMITREX) 50 mg tablet, , Disp: , Rfl:   •  triamcinolone (KENALOG) 0 5 % cream, Apply 1 application  topically 2 (two) times a day To affected area, Disp: , Rfl:   •  amoxicillin-clavulanate (AUGMENTIN) 500-125 mg per tablet, Take 1 tablet by mouth 2 (two) times daily after meals (Patient not taking: Reported on 12/6/2022), Disp: , Rfl:   •  amoxicillin-clavulanate (AUGMENTIN) 875-125 mg per tablet, , Disp: , Rfl:   •  hydrOXYzine HCL (ATARAX) 25 mg tablet, Take 1 tablet (25 mg total) by mouth 3 (three) times a day for 5 days, Disp: 15 tablet, Rfl: 0  •  ondansetron (Zofran ODT) 4 mg disintegrating tablet, Take 1 tablet (4 mg total) by mouth every 6 (six) hours as needed for nausea or vomiting, Disp: 20 tablet, Rfl: 0  •  ondansetron (ZOFRAN) 4 mg tablet, Take 1 tablet (4 mg total) by mouth every 6 (six) hours, Disp: 12 tablet, Rfl: 0  •  predniSONE 10 mg tablet, , Disp: , Rfl:           • Whom besides the patient is providing clinical information about today's encounter?   o NO ADDITIONAL HISTORIAN (patient alone provided history)    Physical Exam and Assessment/Plan by Diagnosis:    1  SEBORRHEIC DERMATITIS    Physical Exam:  • Anatomic Location Affected:  Left scalp  • Morphological Description:  Scaly pink patch   • Pertinent Positives:  • Pertinent Negatives: Additional History of Present Condition:  Patient states has been present for awhile patient has treated it with over the counter Nizoral with not much improvement         Assessment and Plan:  Based on a thorough discussion of this condition and the management approach to it (including a comprehensive discussion of the known risks, side effects and potential benefits of treatment), the patient (family) agrees to implement "the following specific plan:    Can also use over the counter shampoos such as head and shoulder clinical strength or Dove anti-dandruff shampoo, Selsun blue, Neutrogena T sal and T gel  May need to use a combination of shampoos, can alternate them  Could do a laser treatment twice a week at the [email or web address] for at least 3-5 months excimer laser   Start ciclopirox 1% shampoo, apply topically Daily for 2 weeks straight and then on \"Mondays, Wednesdays and Fridays\":  Lather into scalp and leave on for 5 minutes and then rinse off completely  Start fluocinolone 0 01% external solution, apply topically two times a day as needed     Diagnosis: seborrheic dermatitis    Severity:Moderate    Treatment location: left scalp    Total BSA%: 3%    # of treatment per week: 2 X WEEK       Topical Application:  Mineral oil       Is patient taking Methotrexate or Psoralen?: No, If yes patient's start dose will be decreased 50%    Duration of treatment: 3 month           Seborrheic Dermatitis   Seborrheic dermatitis is a common, chronic or relapsing form of eczema/dermatitis that mainly affects the sebaceous, gland-rich regions of the scalp, face, and trunk  There are infantile and adult forms of seborrhoeic dermatitis  It is sometimes associated with psoriasis and, in that clinical scenario, may be referred to as \"sebo-psoriasis  \"  Seborrheic dermatitis is also known as \"seborrheic eczema  \"  Dandruff (also called \"pityriasis capitis\") is an uninflamed form of seborrhoeic dermatitis  Dandruff presents as bran-like scaly patches scattered within hair-bearing areas of the scalp  In an infant, this condition may be referred to as \"cradle cap  \"  The cause of seborrheic dermatitis is not completely understood  It is associated with proliferation of various species of the skin commensal Malassezia, in its yeast (non-pathogenic) form   Its metabolites (such as the fatty acids oleic acid, malssezin, and indole-3-carbaldehyde) may cause " "an inflammatory reaction  Differences in skin barrier lipid content and function may account for individual presentations  Infantile Seborrheic Dermatitis  Infantile seborrheic dermatitis affects babies under the age of 1 months and usually resolves by 1012 months of age  Infantile seborrheic dermatitis causes \"cradle cap\" (diffuse, greasy scaling on scalp)  The rash may spread to affect armpit and groin folds (a type of \"napkin dermatitis\")  There may be associated salmon-pink colored patches that may flake or peel  The rash in this case is usually not especially itchy, so the baby often appears undisturbed by the rash, even when more generalized  Adult Seborrheic Dermatitis  Adult seborrheic dermatitis tends to begin in late adolescence; prevalence is greatest in young adults and in the elderly  It is more common in males than in females  The following factors are sometimes associated with severe adult seborrheic dermatitis:  • Oily skin  • Familial tendency to seborrhoeic dermatitis or a family history of psoriasis  • Immunosuppression: organ transplant recipient, human immunodeficiency virus (HIV) infection and patients with lymphoma  • Neurological and psychiatric diseases: Parkinson disease, tardive dyskinesia, depression, epilepsy, facial nerve palsy, spinal cord injury and congenital disorders such as Down syndrome  • Treatment for psoriasis with psoralen and ultraviolet A (PUVA) therapy  • Lack of sleep  • Stressful events  In adults, seborrheic dermatitis may typically affect the scalp, face (creases around the nose, behind ears, within eyebrows) and upper trunk   Typical clinical features include:  • Winter flares, improving in summer following sun exposure  • Minimal itch most of the time  • Combination oily and dry mid-facial skin  • Ill-defined localized scaly patches or diffuse scale in the scalp  • Blepharitis; scaly red eyelid margins  • [place]nee-pink, thin, scaly, and ill-defined " "plaques in skin folds on both sides of the face  • Petal or ring-shaped flaky patches on hair-line and on anterior chest  • Rash in armpits, under the breasts, in the groin folds and genital creases  • Superficial folliculitis (inflamed hair follicles) on cheeks and upper trunk    Seborrheic dermatitis is diagnosed by its clinical appearance and behavior  Skin biopsy may be helpful but is rarely necessary to make this diagnosis  2  DERMATOFIBROMA    Physical Exam:  • Anatomic Location Affected:  Right knee   • Morphological Description:  Firm brown papule  • Pertinent Positives:  • Pertinent Negatives:    Assessment and Plan:  Based on a thorough discussion of this condition and the management approach to it (including a comprehensive discussion of the known risks, side effects and potential benefits of treatment), the patient (family) agrees to implement the following specific plan:  • Reassure benign     Assessment and Plan:  A dermatofibroma is a common benign fibrous nodule that most often arises on the skin of the lower legs  A dermatofibroma is also called a \"cutaneous fibrous histiocytoma  \"  Dermatofibromas occur at all ages and in people of every ethnicity  They are more common in women than in men  It is not clear if dermatofibroma is a reactive process or if it is a neoplasm  The lesions are made up of proliferating fibroblasts  Histiocytes may also be involved  They are sometimes attributed to an insect bite or ingrownhair or local trauma, but not consistently  They may be more numerous in patients with altered immunity  Dermatofibromas most often occur on the legs and arms, but may also arise on the trunk or any site of the body  Typical clinical features include the following:  • People may have 1 or up to 15 lesions  • Size varies from 0 5-1 5 cm diameter; most lesions are 7-10 mm diameter  • They are firm nodules tethered to the skin surface and mobile over subcutaneous tissue    • The " "skin \"dimples\" on pinching the lesion  • Color may be pink to light brown in white skin, and dark brown to black in dark skin; some appear paler in the center  • They do not usually cause symptoms, but they are sometimes painful or itchy  • Because they are often raised lesions, they may be traumatized, for example by a razor  • Occasionally dozens may erupt within a few months, usually in the setting of immunosuppression (for example autoimmune disease, cancer or certain medications)  • Dermatofibroma does not give rise to cancer  However, occasionally, it may be mistaken for dermatofibrosarcoma or desmoplastic melanoma  A dermatofibroma is harmless and seldom causes any symptoms  Usually, only reassurance is needed  If it is nuisance or causing concern, the lesion can be removed surgically, resulting in a scar that is, by definition, usually longer in diameter than the widest portion of the dermatofibroma  Cryotherapy, shave biopsy and laser surgery are rarely completely successful  Skin punch biopsy or incisional biopsy may be undertaken if there is an atypical feature such as recent enlargement, ulceration, or asymmetrical structures and colours on dermatoscopy  3  TELANGIECTASIA   Physical Exam:  • Anatomic Location Affected: Tip of nose   • Morphological Description:  teleangiectasia   • Pertinent Positives:  • Pertinent Negatives:    Assessment and Plan:  Based on a thorough discussion of this condition and the management approach to it (including a comprehensive discussion of the known risks, side effects and potential benefits of treatment), the patient (family) agrees to implement the following specific plan:  • benign  • Will consider laser treatment     4   ROSACEA    Physical Exam:  • Anatomic Location Affected:  Cheeks   • Morphological Description:  Red patches   • Pertinent Positives:  • Pertinent Negatives:    Assessment and Plan:  Based on a thorough discussion of this condition and " the management approach to it (including a comprehensive discussion of the known risks, side effects and potential benefits of treatment), the patient (family) agrees to implement the following specific plan:  • Consider laser treatment  • Topicals such as over the counter azelaic acid or sulfur face wash  • Topicals metronidazole 0 75 % gel, apply topically twice a day as needed      Rosacea is a chronic rash affecting the mid-face including the nose, cheeks, chin, forehead, and eyelids  The incidence is usually greatest between the ages of 30-60 years and is more common in people with fair skin  Common characteristics include redness, telangiectasias, papules and pustules over affected areas  Rosacea may look similar to acne, but there is a lack of comedones  Occasionally the eyes may also be involved in ocular rosacea  In advanced disease, enlargement of the sebaceous glands in the nose, termed rhinophyma, may be present  Rosacea results in red spots (papules) and sometimes pustules over the face, but unlike acne there are no blackheads, whiteheads, or cystic nodules  Patients often experience increased facial flushing with prominent blood vessels (erythematotelangiectatic rosacea) and dry, sensitive skin  These symptoms are exacerbated by sun exposure, hot or spicy foods, topical steroids and oil-based facial products  In ocular rosacea, eyelids may be red and sore due to conjunctivitis, keratitis, and episcleritis  If rhinophyma develops due to enlargement of sebaceous glands, the patient may have an enlarged and irregularly shaped nose with prominent pores  In rosacea that is refractory to treatment, patients can develop persistent redness and swelling of the face due to lymphatic obstruction (Morbihan disease)  Distribution around the cheeks may be confused with the malar or “butterfly rash” of lupus  However, the rash of lupus spares the nasal creases and lacks papules and pustules   If signs of photosensitivity, oral ulcers, arthritis, and kidney dysfunction are present then consider referral to a rheumatologist      There are many potential causes of rosacea including genetic, environmental, vascular, and inflammatory factors  These include, but are not limited to:  • Chronic exposure to ultraviolet radiation   • Increased immune responses in the form of cathelicidins that promote vessel dilation and infiltration with white blood cells (neutrophils) into the dermis  • Increased matrix metalloproteinases such as collagen and elastase that remodel normal tissue may contribute to inflammation of the skin making it thicker and harder  • There is some evidence to suggest that increased numbers of demodex mites on patient skin may contribute to rosacea papules     General Treatment Approach   • Avoid exacerbating factors such as heat, spicy foods, and alcohol   • Use daily SPF30+ sunscreen and other methods of coverage for sun protection  • Use water-based make-up   • Avoid applying topical steroids to affected areas as they can cause perioral dermatitis and exacerbate rosacea     Topical Treatment Approach  • Metronidazole cream or gel by itself or in combination with oral antibiotics for more severe cases  • Azelaic acid cream or lotion is effective for mild inflammatory rosacea when applied twice daily to affected areas  • Brimonidine gel and oxymetazoline hydrochloride cream can reduce facial redness temporarily   • Ivermectin cream can treat papulopustular rosacea by controlling demodex mites and inflammation   • Pimecrolimus cream or tacrolimus ointment twice a day for 2-3 months can help reduce inflammation    Oral Treatment Approach  • Antibiotics such as doxycycline, minocycline, or erythromycin for 1-3 months  • Clonidine and carvedilol can help reduce facial flushing and are generally well tolerated   Common side effects include low blood pressure, gastrointestinal upset, dry eyes, blurred vision and low heart rate  • Isotretinoin at low doses can be effective for long term treatment when antibiotics fail  Side effects may make it unsuitable for some patients  • NSAIDs such as diclofenac can help reduce discomfort and redness in the skin       Procedural/Surgical Treatment Approach   • Vascular lasers or intense pulsed light treatment may be used to treat persistent telangiectasia and papulopustular rosacea  • Plastic surgery and carbon dioxide lasers may be used to treat rhinophyma     Scribe Attestation    I,:  Stevie Kenny MA am acting as a scribe while in the presence of the attending physician :       I,:  Cody Barone MD personally performed the services described in this documentation    as scribed in my presence :

## 2023-06-06 NOTE — PATIENT INSTRUCTIONS
"1  SEBORRHEIC DERMATITIS  Assessment and Plan:  Based on a thorough discussion of this condition and the management approach to it (including a comprehensive discussion of the known risks, side effects and potential benefits of treatment), the patient (family) agrees to implement the following specific plan:  Start Ketoconazole shampoo 2% for at least 2-3 times a week, lather for 5 minutes prior to rinsing off  Can also use over the counter shampoos such as head and shoulder clinical strength or Dove anti-dandruff shampoo, Selsun blue, Neutrogena T sal and T gel  May need to use a combination of shampoos, can alternate them  Could do a laser treatment twice a week at the [facility] for at least 3-5 months excimer laser   Start ciclopirox 1% shampoo, apply topically Daily for 2 weeks straight and then on \"Mondays, Wednesdays and Fridays\":  Lather into scalp and leave on for 5 minutes and then rinse off completely  Start fluocinolone 0 01% external solution, apply topically two times a day as needed      Seborrheic Dermatitis   Seborrheic dermatitis is a common, chronic or relapsing form of eczema/dermatitis that mainly affects the sebaceous, gland-rich regions of the scalp, face, and trunk  There are infantile and adult forms of seborrhoeic dermatitis  It is sometimes associated with psoriasis and, in that clinical scenario, may be referred to as \"sebo-psoriasis  \"  Seborrheic dermatitis is also known as \"seborrheic eczema  \"  Dandruff (also called \"pityriasis capitis\") is an uninflamed form of seborrhoeic dermatitis  Dandruff presents as bran-like scaly patches scattered within hair-bearing areas of the scalp  In an infant, this condition may be referred to as \"cradle cap  \"  The cause of seborrheic dermatitis is not completely understood  It is associated with proliferation of various species of the skin commensal Malassezia, in its yeast (non-pathogenic) form   Its metabolites (such as the fatty acids oleic " "acid, malssezin, and indole-3-carbaldehyde) may cause an inflammatory reaction  Differences in skin barrier lipid content and function may account for individual presentations  Infantile Seborrheic Dermatitis  Infantile seborrheic dermatitis affects babies under the age of 1 months and usually resolves by 1012 months of age  Infantile seborrheic dermatitis causes \"cradle cap\" (diffuse, greasy scaling on scalp)  The rash may spread to affect armpit and groin folds (a type of \"napkin dermatitis\")  There may be associated salmon-pink colored patches that may flake or peel  The rash in this case is usually not especially itchy, so the baby often appears undisturbed by the rash, even when more generalized  Adult Seborrheic Dermatitis  Adult seborrheic dermatitis tends to begin in late adolescence; prevalence is greatest in young adults and in the elderly  It is more common in males than in females  The following factors are sometimes associated with severe adult seborrheic dermatitis:  Oily skin  Familial tendency to seborrhoeic dermatitis or a family history of psoriasis  Immunosuppression: organ transplant recipient, human immunodeficiency virus (HIV) infection and patients with lymphoma  Neurological and psychiatric diseases: Parkinson disease, tardive dyskinesia, depression, epilepsy, facial nerve palsy, spinal cord injury and congenital disorders such as Down syndrome  Treatment for psoriasis with psoralen and ultraviolet A (PUVA) therapy  Lack of sleep  Stressful events  In adults, seborrheic dermatitis may typically affect the scalp, face (creases around the nose, behind ears, within eyebrows) and upper trunk  Typical clinical features include:   Winter flares, improving in summer following sun exposure  Minimal itch most of the time  Combination oily and dry mid-facial skin  Ill-defined localized scaly patches or diffuse scale in the scalp  Blepharitis; scaly red eyelid margins  [place]-pink, thin, " "scaly, and ill-defined plaques in skin folds on both sides of the face  Petal or ring-shaped flaky patches on hair-line and on anterior chest  Rash in armpits, under the breasts, in the groin folds and genital creases  Superficial folliculitis (inflamed hair follicles) on cheeks and upper trunk    Seborrheic dermatitis is diagnosed by its clinical appearance and behavior  Skin biopsy may be helpful but is rarely necessary to make this diagnosis  2  DERMATOFIBROMA  Assessment and Plan:  Based on a thorough discussion of this condition and the management approach to it (including a comprehensive discussion of the known risks, side effects and potential benefits of treatment), the patient (family) agrees to implement the following specific plan:  Reassure benign     Assessment and Plan:  A dermatofibroma is a common benign fibrous nodule that most often arises on the skin of the lower legs  A dermatofibroma is also called a \"cutaneous fibrous histiocytoma  \"  Dermatofibromas occur at all ages and in people of every ethnicity  They are more common in women than in men  It is not clear if dermatofibroma is a reactive process or if it is a neoplasm  The lesions are made up of proliferating fibroblasts  Histiocytes may also be involved  They are sometimes attributed to an insect bite or ingrownhair or local trauma, but not consistently  They may be more numerous in patients with altered immunity  Dermatofibromas most often occur on the legs and arms, but may also arise on the trunk or any site of the body  Typical clinical features include the following:  People may have 1 or up to 15 lesions  Size varies from 0 5-1 5 cm diameter; most lesions are 7-10 mm diameter  They are firm nodules tethered to the skin surface and mobile over subcutaneous tissue  The skin \"dimples\" on pinching the lesion    Color may be pink to light brown in white skin, and dark brown to black in dark skin; some appear paler in the " center  They do not usually cause symptoms, but they are sometimes painful or itchy  Because they are often raised lesions, they may be traumatized, for example by a razor  Occasionally dozens may erupt within a few months, usually in the setting of immunosuppression (for example autoimmune disease, cancer or certain medications)  Dermatofibroma does not give rise to cancer  However, occasionally, it may be mistaken for dermatofibrosarcoma or desmoplastic melanoma  A dermatofibroma is harmless and seldom causes any symptoms  Usually, only reassurance is needed  If it is nuisance or causing concern, the lesion can be removed surgically, resulting in a scar that is, by definition, usually longer in diameter than the widest portion of the dermatofibroma  Cryotherapy, shave biopsy and laser surgery are rarely completely successful  Skin punch biopsy or incisional biopsy may be undertaken if there is an atypical feature such as recent enlargement, ulceration, or asymmetrical structures and colours on dermatoscopy  3  TELANGIECTASIA   Assessment and Plan:  Based on a thorough discussion of this condition and the management approach to it (including a comprehensive discussion of the known risks, side effects and potential benefits of treatment), the patient (family) agrees to implement the following specific plan:  benign  Will consider laser treatment     4   ROSACEA  Assessment and Plan:  Based on a thorough discussion of this condition and the management approach to it (including a comprehensive discussion of the known risks, side effects and potential benefits of treatment), the patient (family) agrees to implement the following specific plan:  Consider laser treatment  Topicals such as over the counter azelaic acid or sulfur face wash  Topicals metronidazole 0 75 % gel, apply topically twice a day as needed      Rosacea is a chronic rash affecting the mid-face including the nose, cheeks, chin, forehead, and eyelids  The incidence is usually greatest between the ages of 30-60 years and is more common in people with fair skin  Common characteristics include redness, telangiectasias, papules and pustules over affected areas  Rosacea may look similar to acne, but there is a lack of comedones  Occasionally the eyes may also be involved in ocular rosacea  In advanced disease, enlargement of the sebaceous glands in the nose, termed rhinophyma, may be present  Rosacea results in red spots (papules) and sometimes pustules over the face, but unlike acne there are no blackheads, whiteheads, or cystic nodules  Patients often experience increased facial flushing with prominent blood vessels (erythematotelangiectatic rosacea) and dry, sensitive skin  These symptoms are exacerbated by sun exposure, hot or spicy foods, topical steroids and oil-based facial products  In ocular rosacea, eyelids may be red and sore due to conjunctivitis, keratitis, and episcleritis  If rhinophyma develops due to enlargement of sebaceous glands, the patient may have an enlarged and irregularly shaped nose with prominent pores  In rosacea that is refractory to treatment, patients can develop persistent redness and swelling of the face due to lymphatic obstruction (Morbihan disease)  Distribution around the cheeks may be confused with the malar or “butterfly rash” of lupus  However, the rash of lupus spares the nasal creases and lacks papules and pustules  If signs of photosensitivity, oral ulcers, arthritis, and kidney dysfunction are present then consider referral to a rheumatologist      There are many potential causes of rosacea including genetic, environmental, vascular, and inflammatory factors   These include, but are not limited to:  Chronic exposure to ultraviolet radiation   Increased immune responses in the form of cathelicidins that promote vessel dilation and infiltration with white blood cells (neutrophils) into the dermis  Increased matrix metalloproteinases such as collagen and elastase that remodel normal tissue may contribute to inflammation of the skin making it thicker and harder  There is some evidence to suggest that increased numbers of demodex mites on patient skin may contribute to rosacea papules     General Treatment Approach   Avoid exacerbating factors such as heat, spicy foods, and alcohol   Use daily SPF30+ sunscreen and other methods of coverage for sun protection  Use water-based make-up   Avoid applying topical steroids to affected areas as they can cause perioral dermatitis and exacerbate rosacea     Topical Treatment Approach  Metronidazole cream or gel by itself or in combination with oral antibiotics for more severe cases  Azelaic acid cream or lotion is effective for mild inflammatory rosacea when applied twice daily to affected areas  Brimonidine gel and oxymetazoline hydrochloride cream can reduce facial redness temporarily   Ivermectin cream can treat papulopustular rosacea by controlling demodex mites and inflammation   Pimecrolimus cream or tacrolimus ointment twice a day for 2-3 months can help reduce inflammation    Oral Treatment Approach  Antibiotics such as doxycycline, minocycline, or erythromycin for 1-3 months  Clonidine and carvedilol can help reduce facial flushing and are generally well tolerated  Common side effects include low blood pressure, gastrointestinal upset, dry eyes, blurred vision and low heart rate  Isotretinoin at low doses can be effective for long term treatment when antibiotics fail  Side effects may make it unsuitable for some patients  NSAIDs such as diclofenac can help reduce discomfort and redness in the skin       Procedural/Surgical Treatment Approach   Vascular lasers or intense pulsed light treatment may be used to treat persistent telangiectasia and papulopustular rosacea  Plastic surgery and carbon dioxide lasers may be used to treat rhinophyma

## 2023-06-27 ENCOUNTER — OFFICE VISIT (OUTPATIENT)
Dept: DERMATOLOGY | Facility: CLINIC | Age: 38
End: 2023-06-27
Payer: COMMERCIAL

## 2023-06-27 ENCOUNTER — TELEPHONE (OUTPATIENT)
Dept: DERMATOLOGY | Facility: CLINIC | Age: 38
End: 2023-06-27

## 2023-06-27 DIAGNOSIS — L21.9 SEBORRHEIC DERMATITIS: Primary | ICD-10-CM

## 2023-06-27 PROCEDURE — 96910 PHOTCHMTX TAR&UVB/PTRLTM&UVB: CPT | Performed by: DERMATOLOGY

## 2023-06-27 NOTE — PROGRESS NOTES
PHOTOTHERAPY    Treatment type: Excimer  Visit number: 1  Diagnosis: Seborrheic dermatitis  Anatomical location: Other (Left scalp)  Severity: Moderate  BSA: Other (3%)  Treatment schedule: 2x weekly  Reaction: Other (First treatment)  Increase %: 20%  mJoules: 500  Total Dosed Cm2: 66  Max dose: TBD  Topical: Mineral oil  Topical applied by: Assistant  Special shield: Goggles  Tech: Prudence Cee SWEENEY  Comments: Next appt: 6/30

## 2023-06-27 NOTE — TELEPHONE ENCOUNTER
Pt is calling to inquire about pricing of treatment:    Anatomic Location Affected: Tip of nose  Morphological Description:  teleangiectasia       Can you please review and advise me on what the approximate costs would be, and location (pedro or CV)  Thank you!

## 2023-06-30 ENCOUNTER — OFFICE VISIT (OUTPATIENT)
Dept: DERMATOLOGY | Facility: CLINIC | Age: 38
End: 2023-06-30
Payer: COMMERCIAL

## 2023-06-30 DIAGNOSIS — L21.9 SEBORRHEIC DERMATITIS: Primary | ICD-10-CM

## 2023-06-30 NOTE — PROGRESS NOTES
PHOTOTHERAPY    Treatment type: Excimer  Visit number: 2  Diagnosis: Seborrheic dermatitis  Anatomical location: Other (left scalp)  Severity: Moderate  BSA: Other (3%)  Treatment schedule: 2x weekly  Reaction: None  Increase %: 10%  mJoules: 550  Total Dosed Cm2: 72  Max dose: TBD  Topical: Mineral oil  Topical applied by: Assistant  Special shield: Kt  Tech: Kiersten Chavarria RN  Comments: Next appt: 7/7

## 2023-07-05 NOTE — TELEPHONE ENCOUNTER
Thank you! That was enough generic information for me to make pt comfortable enough to schedule apt.

## 2023-07-07 ENCOUNTER — OFFICE VISIT (OUTPATIENT)
Dept: DERMATOLOGY | Facility: CLINIC | Age: 38
End: 2023-07-07
Payer: COMMERCIAL

## 2023-07-07 DIAGNOSIS — L21.9 SEBORRHEIC DERMATITIS: Primary | ICD-10-CM

## 2023-07-07 PROCEDURE — 96910 PHOTCHMTX TAR&UVB/PTRLTM&UVB: CPT | Performed by: DERMATOLOGY

## 2023-07-07 NOTE — PROGRESS NOTES
PHOTOTHERAPY    Treatment type: Excimer  Visit number: 3  Diagnosis: Seborrheic dermatitis  Anatomical location: Other (left scalp)  Severity: Moderate  BSA: Other (3%)  Treatment schedule: 2x weekly  Reaction: None  Increase %: Other (dose held same)  mJoules: 550  Total Dosed Cm2: 63  Max dose: TBD  Topical: Mineral oil  Topical applied by: Assistant  Special shield: Goggrickey  Tech: Colleen Roberts RN  Comments: Next appt:

## 2023-07-10 ENCOUNTER — OFFICE VISIT (OUTPATIENT)
Dept: DERMATOLOGY | Facility: CLINIC | Age: 38
End: 2023-07-10
Payer: COMMERCIAL

## 2023-07-10 DIAGNOSIS — L21.9 SEBORRHEIC DERMATITIS: Primary | ICD-10-CM

## 2023-07-10 PROCEDURE — 96910 PHOTCHMTX TAR&UVB/PTRLTM&UVB: CPT | Performed by: DERMATOLOGY

## 2023-07-10 NOTE — PROGRESS NOTES
PHOTOTHERAPY    Treatment type: Excimer  Visit number: 4  Diagnosis: Seborrheic dermatitis  Anatomical location: Other (left scalp)  Severity: Moderate  BSA: Other (3%)  Treatment schedule: 2x weekly  Reaction: None  Increase %: 10%  mJoules: 610  Total Dosed Cm2: 66  Max dose: TBD  Topical: Mineral oil  Topical applied by: Assistant  Special shield: Kt  Tech: Sandra Jean Baptiste RN  Comments: Next appt: 7/17

## 2023-07-11 ENCOUNTER — TELEPHONE (OUTPATIENT)
Dept: DERMATOLOGY | Facility: CLINIC | Age: 38
End: 2023-07-11

## 2023-07-17 ENCOUNTER — OFFICE VISIT (OUTPATIENT)
Dept: DERMATOLOGY | Facility: CLINIC | Age: 38
End: 2023-07-17
Payer: COMMERCIAL

## 2023-07-17 DIAGNOSIS — L21.9 SEBORRHEIC DERMATITIS: Primary | ICD-10-CM

## 2023-07-17 PROCEDURE — 96910 PHOTCHMTX TAR&UVB/PTRLTM&UVB: CPT | Performed by: DERMATOLOGY

## 2023-07-17 NOTE — PROGRESS NOTES
PHOTOTHERAPY    Treatment type: Excimer  Visit number: 5  Diagnosis: Seborrheic dermatitis  Anatomical location: Other (left scalp)  Severity: Moderate  BSA: Other (3%)  Treatment schedule: 2x weekly  Reaction: None  Increase %: Other (dose held the same due to pt missing one week of treatment)  mJoules: 610  Total Dosed Cm2: 59  Max dose: TBD  Topical: Mineral oil  Topical applied by: Assistant  Special shield: Goggles  Tech: Erika Ingram RN  Comments: Next appt: 7/21

## 2023-07-18 ENCOUNTER — TELEPHONE (OUTPATIENT)
Dept: DERMATOLOGY | Facility: CLINIC | Age: 38
End: 2023-07-18

## 2023-07-18 NOTE — TELEPHONE ENCOUNTER
Pt contacted billing as she is being charged a copay for each excimer treatment. Message sent from practice manager to nurse for investigation of charge (pt has been charged her copay for each visit).

## 2023-07-21 ENCOUNTER — OFFICE VISIT (OUTPATIENT)
Dept: DERMATOLOGY | Facility: CLINIC | Age: 38
End: 2023-07-21
Payer: COMMERCIAL

## 2023-07-21 DIAGNOSIS — L21.9 SEBORRHEIC DERMATITIS: Primary | ICD-10-CM

## 2023-07-21 PROCEDURE — 96910 PHOTCHMTX TAR&UVB/PTRLTM&UVB: CPT | Performed by: DERMATOLOGY

## 2023-07-21 NOTE — PROGRESS NOTES
PHOTOTHERAPY    Treatment type: Excimer  Visit number: 6  Diagnosis: Seborrheic dermatitis  Anatomical location: Other (left scalp)  Severity: Moderate  BSA: Other (3%)  Treatment schedule: 2x weekly  Reaction: None  Increase %: 10%  mJoules: 680  Total Dosed Cm2: 30  Max dose: TBD  Topical: Mineral oil  Topical applied by: Assistant  Special shield: Kt  Tech: Theresa Wild RN  Comments: Next appt: 7/25

## 2023-07-25 ENCOUNTER — OFFICE VISIT (OUTPATIENT)
Dept: DERMATOLOGY | Facility: CLINIC | Age: 38
End: 2023-07-25
Payer: COMMERCIAL

## 2023-07-25 DIAGNOSIS — L21.9 SEBORRHEIC DERMATITIS: Primary | ICD-10-CM

## 2023-07-25 PROCEDURE — 96910 PHOTCHMTX TAR&UVB/PTRLTM&UVB: CPT | Performed by: DERMATOLOGY

## 2023-07-25 NOTE — PROGRESS NOTES
PHOTOTHERAPY    Treatment type: Excimer  Visit number: 7  Diagnosis: Seborrheic dermatitis  Anatomical location: Other (left scalp)  Severity: Moderate  BSA: Other (3%)  Treatment schedule: 2x weekly  Reaction: None  Increase %: Other (dose held the same due to thinning plaques)  mJoules: 680  Total Dosed Cm2: 29  Max dose: TBD  Topical: Mineral oil  Topical applied by: Assistant  Special shield: Kt  Tech: Ck Jaime RN  Comments: Next appt: 7/27

## 2023-07-28 ENCOUNTER — OFFICE VISIT (OUTPATIENT)
Dept: DERMATOLOGY | Facility: CLINIC | Age: 38
End: 2023-07-28
Payer: COMMERCIAL

## 2023-07-28 DIAGNOSIS — L21.9 SEBORRHEIC DERMATITIS: Primary | ICD-10-CM

## 2023-07-28 PROCEDURE — 96910 PHOTCHMTX TAR&UVB/PTRLTM&UVB: CPT | Performed by: DERMATOLOGY

## 2023-07-28 NOTE — PROGRESS NOTES
PHOTOTHERAPY    Treatment type: Excimer  Visit number: 8  Diagnosis: Seborrheic dermatitis  Anatomical location: Other (left scalp)  Severity: Moderate  BSA: Other (3%)  Treatment schedule: 2x weekly  Reaction: None  Increase %: Other (dose held same due to thinning plaques)  mJoules: 680  Total Dosed Cm2: 16  Max dose: TBD  Topical: Mineral oil  Topical applied by: Assistant  Special shield: Kt  Tech: Kwabena Bentley RN  Comments: Next appt: 8/1

## 2023-08-01 ENCOUNTER — OFFICE VISIT (OUTPATIENT)
Dept: DERMATOLOGY | Facility: CLINIC | Age: 38
End: 2023-08-01
Payer: COMMERCIAL

## 2023-08-01 DIAGNOSIS — L21.9 SEBORRHEIC DERMATITIS: Primary | ICD-10-CM

## 2023-08-01 PROCEDURE — 96910 PHOTCHMTX TAR&UVB/PTRLTM&UVB: CPT | Performed by: STUDENT IN AN ORGANIZED HEALTH CARE EDUCATION/TRAINING PROGRAM

## 2023-08-01 NOTE — PROGRESS NOTES
PHOTOTHERAPY    Treatment type: Excimer  Visit number: 9  Diagnosis: Seborrheic dermatitis  Anatomical location: Other (left scalp)  Severity: Moderate  BSA: Other (3%)  Treatment schedule: 2x weekly  Reaction: None  Increase %: 10%  mJoules: 750  Total Dosed Cm2: 33  Max dose: TBD  Topical: Mineral oil  Topical applied by: Assistant  Special shield: Goggles  Tech: Theresa Wild RN  Comments: Next appt: 8/4

## 2023-08-04 ENCOUNTER — OFFICE VISIT (OUTPATIENT)
Dept: DERMATOLOGY | Facility: CLINIC | Age: 38
End: 2023-08-04
Payer: COMMERCIAL

## 2023-08-04 DIAGNOSIS — L21.9 SEBORRHEIC DERMATITIS: Primary | ICD-10-CM

## 2023-08-04 PROCEDURE — 96910 PHOTCHMTX TAR&UVB/PTRLTM&UVB: CPT | Performed by: DERMATOLOGY

## 2023-08-04 NOTE — PROGRESS NOTES
PHOTOTHERAPY    Treatment type: Excimer  Visit number: 10  Diagnosis: Seborrheic dermatitis  Anatomical location: Other (left scalp)  Severity: Moderate  BSA: Other (3%)  Treatment schedule: 2x weekly  Reaction: None  Increase %: 10%  mJoules: 830  Total Dosed Cm2: 30  Max dose: TBD  Topical: Mineral oil  Topical applied by: Assistant  Special shield: Kt  Tech: Rin Francis RN  Comments: Next appt: 8/7

## 2023-08-07 ENCOUNTER — OFFICE VISIT (OUTPATIENT)
Dept: DERMATOLOGY | Facility: CLINIC | Age: 38
End: 2023-08-07
Payer: COMMERCIAL

## 2023-08-07 DIAGNOSIS — L21.9 SEBORRHEIC DERMATITIS: Primary | ICD-10-CM

## 2023-08-07 PROCEDURE — 96910 PHOTCHMTX TAR&UVB/PTRLTM&UVB: CPT | Performed by: DERMATOLOGY

## 2023-08-07 NOTE — PROGRESS NOTES
PHOTOTHERAPY    Treatment type: Excimer  Visit number: 11  Diagnosis: Seborrheic dermatitis  Anatomical location: Other (left scalp)  Severity: Moderate  BSA: Other (3%)  Treatment schedule: 2x weekly  Reaction: None  Increase %: 10%  mJoules: 920  Total Dosed Cm2: 24  Max dose: TBD  Topical: Mineral oil  Topical applied by: Assistant  Special shield: Kt  Tech: Renée Hinds RN  Comments: Next appt: 8/10

## 2023-08-10 ENCOUNTER — OFFICE VISIT (OUTPATIENT)
Dept: DERMATOLOGY | Facility: CLINIC | Age: 38
End: 2023-08-10
Payer: COMMERCIAL

## 2023-08-10 DIAGNOSIS — L21.9 SEBORRHEIC DERMATITIS: Primary | ICD-10-CM

## 2023-08-10 PROCEDURE — 96910 PHOTCHMTX TAR&UVB/PTRLTM&UVB: CPT | Performed by: DERMATOLOGY

## 2023-08-10 NOTE — PROGRESS NOTES
PHOTOTHERAPY    Treatment type: Excimer  Visit number: 12  Diagnosis: Seborrheic dermatitis  Anatomical location: Other (left scalp)  Severity: Moderate  BSA: Other (3%)  Treatment schedule: 2x weekly  Reaction: None  Increase %: Other (dose held the same due to thinning plaques)  mJoules: 920  Total Dosed Cm2: 19  Max dose: TBD  Topical: Mineral oil  Topical applied by: Assistant  Special shield: Kt  Tech: Renée Hinds RN  Comments: Next appt: 8/15

## 2023-08-15 ENCOUNTER — OFFICE VISIT (OUTPATIENT)
Dept: DERMATOLOGY | Facility: CLINIC | Age: 38
End: 2023-08-15
Payer: COMMERCIAL

## 2023-08-15 DIAGNOSIS — L21.9 SEBORRHEIC DERMATITIS: Primary | ICD-10-CM

## 2023-08-15 PROCEDURE — 96910 PHOTCHMTX TAR&UVB/PTRLTM&UVB: CPT | Performed by: DERMATOLOGY

## 2023-08-15 NOTE — PROGRESS NOTES
PHOTOTHERAPY    Treatment type: Excimer  Visit number: 13  Diagnosis: Seborrheic dermatitis  Anatomical location: Other (left scalp)  Severity: Moderate  BSA: Other (3%)  Treatment schedule: 2x weekly  Reaction: None  Increase %: Other (dose held the same due to thinning plaques)  mJoules: 920  Total Dosed Cm2: 22  Max dose: TBD  Topical: Mineral oil  Topical applied by: Assistant  Special shield: Kt  Tech: Darrion SWEENEY  Comments: Next appt: 8/17

## 2023-08-18 ENCOUNTER — OFFICE VISIT (OUTPATIENT)
Dept: DERMATOLOGY | Facility: CLINIC | Age: 38
End: 2023-08-18
Payer: COMMERCIAL

## 2023-08-18 DIAGNOSIS — L21.9 SEBORRHEIC DERMATITIS: Primary | ICD-10-CM

## 2023-08-18 PROCEDURE — 96910 PHOTCHMTX TAR&UVB/PTRLTM&UVB: CPT | Performed by: DERMATOLOGY

## 2023-08-18 NOTE — PROGRESS NOTES
PHOTOTHERAPY    Treatment type: Excimer  Visit number: 14  Diagnosis: Seborrheic dermatitis  Anatomical location: Other (left scalp)  Severity: Moderate  BSA: Other (3%)  Treatment schedule: 2x weekly  Reaction: None  Increase %: Other (dose held the same due to thinning plaques)  mJoules: 920  Total Dosed Cm2: 19  Max dose: TBD  Topical: Mineral oil  Topical applied by: Assistant  Special shield: Goggles  Tech: Shilo Villegas RN  Comments: Next appt: 8/21

## 2023-08-22 ENCOUNTER — OFFICE VISIT (OUTPATIENT)
Dept: DERMATOLOGY | Facility: CLINIC | Age: 38
End: 2023-08-22
Payer: COMMERCIAL

## 2023-08-22 DIAGNOSIS — L21.9 SEBORRHEIC DERMATITIS: Primary | ICD-10-CM

## 2023-08-22 PROCEDURE — 96910 PHOTCHMTX TAR&UVB/PTRLTM&UVB: CPT | Performed by: STUDENT IN AN ORGANIZED HEALTH CARE EDUCATION/TRAINING PROGRAM

## 2023-08-22 NOTE — PROGRESS NOTES
PHOTOTHERAPY    Treatment type: Excimer  Visit number: 15  Diagnosis: Seborrheic dermatitis  Anatomical location: Other (left scalp)  Severity: Moderate  BSA: Other (3%)  Treatment schedule: 2x weekly  Reaction: None  Increase %: 10%  mJoules: 1020  Total Dosed Cm2: 16  Max dose: TBD  Topical: Mineral oil  Topical applied by: Assistant  Special shield: Kt  Tech: Judit Willis RN  Comments: Next appt: 8/24

## 2023-08-24 ENCOUNTER — OFFICE VISIT (OUTPATIENT)
Dept: DERMATOLOGY | Facility: CLINIC | Age: 38
End: 2023-08-24
Payer: COMMERCIAL

## 2023-08-24 DIAGNOSIS — L21.9 SEBORRHEIC DERMATITIS: Primary | ICD-10-CM

## 2023-08-24 PROCEDURE — 96910 PHOTCHMTX TAR&UVB/PTRLTM&UVB: CPT | Performed by: STUDENT IN AN ORGANIZED HEALTH CARE EDUCATION/TRAINING PROGRAM

## 2023-08-24 NOTE — PROGRESS NOTES
PHOTOTHERAPY    Treatment type: Excimer  Visit number: 16  Diagnosis: Seborrheic dermatitis  Anatomical location: Other (left scalp)  Severity: Moderate  BSA: Other (3%)  Treatment schedule: 2x weekly  Reaction: None  Increase %: Other (dose held the same due to thinning plaques)  mJoules: 1020  Total Dosed Cm2: 16  Max dose: TBD  Topical: Mineral oil  Topical applied by: Assistant  Special shield: Kt  Tech: Yelitza Glez RN  Comments: Next appt: 8/29

## 2023-08-29 ENCOUNTER — TELEPHONE (OUTPATIENT)
Dept: DERMATOLOGY | Facility: CLINIC | Age: 38
End: 2023-08-29

## 2023-08-30 DIAGNOSIS — L90.0 LICHEN SCLEROSUS: Primary | ICD-10-CM

## 2023-08-30 NOTE — TELEPHONE ENCOUNTER
----- Message from [employer]. Nikki sent at 8/30/2023  3:36 PM EDT -----  Regarding: Cream  Contact: [phone]  Can you please send me in a refill for the cream I am using? It wouldn’t let me request a refill right from my chart, I’m assuming it’s something that has to be approved. Thank you!

## 2023-08-31 NOTE — TELEPHONE ENCOUNTER
Please check to see which cream she is using as there are multiple in her chart and yes, it can be refilled.

## 2023-09-01 RX ORDER — CLOBETASOL PROPIONATE 0.5 MG/G
OINTMENT TOPICAL
Qty: 60 G | Refills: 0 | Status: SHIPPED | OUTPATIENT
Start: 2023-09-01

## 2023-09-12 NOTE — TELEPHONE ENCOUNTER
Patient called concerned for a bill that she received couple days ago , she spoke with billing department already without a solution . She would like a call from Zola Najjar .

## 2023-09-12 NOTE — TELEPHONE ENCOUNTER
Orlando Hancock,    I already sent a message to our clinical coordinator to review this case.  Thanks!!

## 2023-09-14 ENCOUNTER — TELEPHONE (OUTPATIENT)
Age: 38
End: 2023-09-14

## 2023-09-14 NOTE — TELEPHONE ENCOUNTER
Called patient to speak with her in regard to her current bill for excimer therapy. Explained to patient that while her insurance company may not have required a prior authorization to begin treatment that it may still require a co-pay. Explained to patient that the nurse visit portion of the visit is not billed but the procedure of doing the excimer may have a co-pay associated. Offered to reach out to insurance company and billing to see if we can come to a resolution. Will call patient back with further information.

## 2023-11-07 ENCOUNTER — ANNUAL EXAM (OUTPATIENT)
Dept: OBGYN CLINIC | Facility: MEDICAL CENTER | Age: 38
End: 2023-11-07
Payer: COMMERCIAL

## 2023-11-07 VITALS — SYSTOLIC BLOOD PRESSURE: 116 MMHG | DIASTOLIC BLOOD PRESSURE: 72 MMHG | WEIGHT: 176 LBS | BODY MASS INDEX: 34.37 KG/M2

## 2023-11-07 DIAGNOSIS — Z71.85 HPV VACCINE COUNSELING: ICD-10-CM

## 2023-11-07 DIAGNOSIS — Z01.411 ENCNTR FOR GYN EXAM (GENERAL) (ROUTINE) W ABNORMAL FINDINGS: Primary | ICD-10-CM

## 2023-11-07 DIAGNOSIS — L90.0 LICHEN SCLEROSUS: ICD-10-CM

## 2023-11-07 PROCEDURE — S0612 ANNUAL GYNECOLOGICAL EXAMINA: HCPCS | Performed by: NURSE PRACTITIONER

## 2023-11-07 RX ORDER — CLOBETASOL PROPIONATE 0.5 MG/G
OINTMENT TOPICAL
Qty: 60 G | Refills: 0 | Status: SHIPPED | OUTPATIENT
Start: 2023-11-07

## 2023-11-07 NOTE — PROGRESS NOTES
Assessment/Plan:  Pap with high risk HPV testing every 5 years, if normal. Due   Sexually transmitted infection testing as indicated. Declined. Exercise most days of week-minimum of 150-300 minutes per week. Obtain appropriate diet and hydration. Calcium 1000 mg (in divided doses-max 600 mg at one time) + 600 vit D daily. HPV 9 vaccine recommended through age 39. Check with your insurance for coverage. If covered, call office to schedule start of vaccine series. Annual mammogram starting at age 36, monthly breast self exam recommended. Kegels 20 times twice daily. Clobetasol rx refilled as requested. Aware of instructions. Call your insurance company to verify coverage prior to completing any ordered tests. Return to office in one year or sooner, if needed. 1. Encntr for gyn exam (general) (routine) w abnormal findings    2. Lichen sclerosus  -     clobetasol (TEMOVATE) 0.05 % ointment; Apply a pea sized amount to affected area twice daily until skin texture normalizes which may take 2-4 months, then apply 3 times weekly. 3. HPV vaccine counseling    4. BMI 34.0-34.9,adult               Subjective:      Patient ID: Zulema Jones is a 43 y.o. female. HPI    Zulema Jones is a 43 y.o.  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 No gynecologic health concerns. She had a vulvar biopsy on 22 for agglutination of labia minora, erythema noted in hearts line and raised white patch noted on right labia. Results returned LS. LS is stable with the use of clobetasol. Requests refill. Monthly menses x 5-6 days with heavy flow x 1-2 days then mod to light flow. Menses is acceptable. Exercise- 3-5 times per week. 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 Zulema Jones is sexually active with male partner/  of 14 years. Monogamous and feels safe in this relationship. Denies vaginal pain,bleeding or dryness. She uses vasectomy  for contraception. She is not interested in STD screening today. She denies vaginal discharge, itching or pelvic pain. She has no urinary concerns, does not have incontinence. No bowel concerns. No breast concerns. Last pap: 11/22/2022 normal with negative HR HPV   DEXA scan: Not on file  Mammogram: Not on file   Colonoscopy: Not on file  HPV vaccine series: likely not    Family history of cancer:   Cancer-related family history includes Ovarian cancer in her maternal aunt. There is no history of Breast cancer, Colon cancer, Uterine cancer, or Cervical cancer. The following portions of the patient's history were reviewed and updated as appropriate: allergies, current medications, past family history, past medical history, past social history, past surgical history, and problem list.    Review of Systems   Constitutional: Negative. Negative for activity change, appetite change, chills, diaphoresis, fatigue, fever and unexpected weight change. HENT:  Negative for congestion, dental problem, sneezing, sore throat and trouble swallowing. Eyes:  Negative for visual disturbance. Respiratory:  Negative for chest tightness and shortness of breath. Cardiovascular:  Negative for chest pain and leg swelling. Gastrointestinal:  Negative for abdominal pain, constipation, diarrhea, nausea and vomiting. Genitourinary:  Negative for difficulty urinating, dyspareunia, dysuria, frequency, hematuria, menstrual problem, pelvic pain, urgency, vaginal bleeding, vaginal discharge and vaginal pain. Musculoskeletal:  Negative for back pain and neck pain. Skin: Negative. Allergic/Immunologic: Negative. Neurological:  Negative for weakness and headaches. Hematological:  Negative for adenopathy. Psychiatric/Behavioral: Negative.            Objective:      /72 (BP Location: Left arm, Patient Position: Sitting, Cuff Size: Large)   Wt 79.8 kg (176 lb)   LMP 10/20/2023 BMI 34.37 kg/m²          Physical Exam  Vitals and nursing note reviewed. Constitutional:       Appearance: Normal appearance. She is well-developed. HENT:      Head: Normocephalic and atraumatic. Eyes:      General:         Right eye: No discharge. Left eye: No discharge. Neck:      Thyroid: No thyromegaly. Trachea: Trachea normal.   Cardiovascular:      Rate and Rhythm: Normal rate and regular rhythm. Heart sounds: Normal heart sounds. Pulmonary:      Effort: Pulmonary effort is normal.      Breath sounds: Normal breath sounds. Chest:   Breasts:     Breasts are symmetrical.      Right: Normal. No inverted nipple, mass, nipple discharge, skin change or tenderness. Left: Normal. No inverted nipple, mass, nipple discharge, skin change or tenderness. Abdominal:      Palpations: Abdomen is soft. Genitourinary:     General: Normal vulva. Exam position: Lithotomy position. Labia:         Right: No rash, tenderness, lesion or injury. Left: No rash, tenderness, lesion or injury. Urethra: No prolapse, urethral pain, urethral swelling or urethral lesion. Vagina: Normal. No signs of injury and foreign body. No vaginal discharge, erythema, tenderness or bleeding. Cervix: Normal.      Uterus: Normal.       Adnexa:         Right: No mass, tenderness or fullness. Left: No mass, tenderness or fullness. Rectum: No external hemorrhoid. Comments: Slight wax paper type appearance with hypopigmentation noted in area marked on diagram.  Musculoskeletal:         General: Normal range of motion. Cervical back: Normal range of motion and neck supple. Lymphadenopathy:      Head:      Right side of head: No submental, submandibular or tonsillar adenopathy. Left side of head: No submental, submandibular or tonsillar adenopathy. Cervical: No cervical adenopathy.       Upper Body:      Right upper body: No supraclavicular or axillary adenopathy. Left upper body: No supraclavicular or axillary adenopathy. Lower Body: No right inguinal adenopathy. No left inguinal adenopathy. Skin:     General: Skin is warm and dry. Neurological:      Mental Status: She is alert and oriented to person, place, and time.    Psychiatric:         Mood and Affect: Mood normal.         Behavior: Behavior normal.

## 2023-11-07 NOTE — PATIENT INSTRUCTIONS
Pap with high risk HPV testing every 5 years, if normal. Due 2027  Sexually transmitted infection testing as indicated. Declined. Exercise most days of week-minimum of 150-300 minutes per week. Obtain appropriate diet and hydration. Calcium 1000 mg (in divided doses-max 600 mg at one time) + 600 vit D daily. HPV 9 vaccine recommended through age 39. Check with your insurance for coverage. If covered, call office to schedule start of vaccine series. Annual mammogram starting at age 36, monthly breast self exam recommended. Kegels 20 times twice daily. Clobetasol rx refilled as requested. Aware of instructions. Call your insurance company to verify coverage prior to completing any ordered tests. Return to office in one year or sooner, if needed.

## 2023-11-08 ENCOUNTER — OFFICE VISIT (OUTPATIENT)
Dept: DERMATOLOGY | Facility: CLINIC | Age: 38
End: 2023-11-08
Payer: COMMERCIAL

## 2023-11-08 VITALS — TEMPERATURE: 97.1 F | HEIGHT: 60 IN | BODY MASS INDEX: 33.38 KG/M2 | WEIGHT: 170 LBS

## 2023-11-08 DIAGNOSIS — L21.9 SEBORRHEIC DERMATITIS: Primary | ICD-10-CM

## 2023-11-08 PROCEDURE — 99213 OFFICE O/P EST LOW 20 MIN: CPT | Performed by: DERMATOLOGY

## 2023-11-08 NOTE — PATIENT INSTRUCTIONS
SEBORRHEIC DERMATITIS        Assessment and Plan:  Based on a thorough discussion of this condition and the management approach to it (including a comprehensive discussion of the known risks, side effects and potential benefits of treatment), the patient (family) agrees to implement the following specific plan: Will start, home phototherapy   Continue Synalar solution twice a day as needed      Seborrheic Dermatitis   Seborrheic dermatitis is a common, chronic or relapsing form of eczema/dermatitis that mainly affects the sebaceous, gland-rich regions of the scalp, face, and trunk. There are infantile and adult forms of seborrhoeic dermatitis. It is sometimes associated with psoriasis and, in that clinical scenario, may be referred to as "sebo-psoriasis."  Seborrheic dermatitis is also known as "seborrheic eczema."  Dandruff (also called "pityriasis capitis") is an uninflamed form of seborrhoeic dermatitis. Dandruff presents as bran-like scaly patches scattered within hair-bearing areas of the scalp. In an infant, this condition may be referred to as "cradle cap."  The cause of seborrheic dermatitis is not completely understood. It is associated with proliferation of various species of the skin commensal Malassezia, in its yeast (non-pathogenic) form. Its metabolites (such as the fatty acids oleic acid, malssezin, and indole-3-carbaldehyde) may cause an inflammatory reaction. Differences in skin barrier lipid content and function may account for individual presentations. Infantile Seborrheic Dermatitis  Infantile seborrheic dermatitis affects babies under the age of 1 months and usually resolves by 1012 months of age. Infantile seborrheic dermatitis causes "cradle cap" (diffuse, greasy scaling on scalp). The rash may spread to affect armpit and groin folds (a type of "napkin dermatitis"). There may be associated salmon-pink colored patches that may flake or peel.   The rash in this case is usually not especially itchy, so the baby often appears undisturbed by the rash, even when more generalized. Adult Seborrheic Dermatitis  Adult seborrheic dermatitis tends to begin in late adolescence; prevalence is greatest in young adults and in the elderly. It is more common in males than in females. The following factors are sometimes associated with severe adult seborrheic dermatitis:  Oily skin  Familial tendency to seborrhoeic dermatitis or a family history of psoriasis  Immunosuppression: organ transplant recipient, human immunodeficiency virus (HIV) infection and patients with lymphoma  Neurological and psychiatric diseases: Parkinson disease, tardive dyskinesia, depression, epilepsy, facial nerve palsy, spinal cord injury and congenital disorders such as Down syndrome  Treatment for psoriasis with psoralen and ultraviolet A (PUVA) therapy  Lack of sleep  Stressful events. In adults, seborrheic dermatitis may typically affect the scalp, face (creases around the nose, behind ears, within eyebrows) and upper trunk. Typical clinical features include: Winter flares, improving in summer following sun exposure  Minimal itch most of the time  Combination oily and dry mid-facial skin  Ill-defined localized scaly patches or diffuse scale in the scalp  Blepharitis; scaly red eyelid margins  [place]-pink, thin, scaly, and ill-defined plaques in skin folds on both sides of the face  Petal or ring-shaped flaky patches on hair-line and on anterior chest  Rash in armpits, under the breasts, in the groin folds and genital creases  Superficial folliculitis (inflamed hair follicles) on cheeks and upper trunk    Seborrheic dermatitis is diagnosed by its clinical appearance and behavior. Skin biopsy may be helpful but is rarely necessary to make this diagnosis.

## 2023-11-08 NOTE — PROGRESS NOTES
[facility] Note     Patient Name: Chris Hassan  Encounter Date: 11/8/2023    Have you been cared for by a [facility]atologist in the last 3 years and, if so, which description applies to you? Yes. I have been here within the last 3 years, and my medical history has NOT changed since that time. I am FEMALE/of child-bearing potential.    REVIEW OF SYSTEMS:  Have you recently had or currently have any of the following? No changes in my recent health. PAST MEDICAL HISTORY:  Have you personally ever had or currently have any of the following? If "YES," then please provide more detail. No changes in my medical history. HISTORY OF IMMUNOSUPPRESSION: Do you have a history of any of the following:  Systemic Immunosuppression such as Diabetes, Biologic or Immunotherapy, Chemotherapy, Organ Transplantation, Bone Marrow Transplantation? No     Answering "YES" requires the addition of the dotphrase "IMMUNOSUPPRESSED" as the first diagnosis of the patient's visit. FAMILY HISTORY:  Any "first degree relatives" (parent, brother, sister, or child) with the following? No changes in my family's known health. PATIENT EXPERIENCE:    Do you want the Dermatologist to perform a COMPLETE skin exam today including a clinical examination under the "bra and underwear" areas? NO  If necessary, do we have your permission to call and leave a detailed message on your Preferred Phone number that includes your specific medical information? Yes      Allergies   Allergen Reactions    Erythromycin Anaphylaxis and Rash     Reaction Date: [id number];     Pollen Extract Sneezing      Current Outpatient Medications:     Ciclopirox 1 % shampoo, Apply topically Daily for 2 weeks straight and then on "Mondays, Wednesdays and Fridays":  Lather into scalp and leave on for 5 minutes and then rinse off completely. , Disp: 120 mL, Rfl: 11    clobetasol (TEMOVATE) 0.05 % ointment, Apply a pea sized amount to affected area twice daily until skin texture normalizes which may take 2-4 months, then apply 3 times weekly. , Disp: 60 g, Rfl: 0    fluocinolone (Synalar) 0.01 % external solution, Apply topically 2 (two) times a day As needed, Disp: 60 mL, Rfl: 3    lisinopril (ZESTRIL) 5 mg tablet, Take 5 mg by mouth daily, Disp: , Rfl:     LORazepam (ATIVAN) 0.5 mg tablet, Take 0.5 mg by mouth 2 (two) times a day as needed, Disp: , Rfl:     metroNIDAZOLE (METROGEL) 0.75 % gel, Apply topically 2 (two) times a day, Disp: 45 g, Rfl: 11    sertraline (ZOLOFT) 50 mg tablet, Take 25 mg by mouth, Disp: , Rfl:     SUMAtriptan (IMITREX) 50 mg tablet, , Disp: , Rfl:     triamcinolone (KENALOG) 0.5 % cream, Apply 1 application. topically 2 (two) times a day To affected area, Disp: , Rfl:     clobetasol (TEMOVATE) 0.05 % ointment, Use a pea sized amount to affected area twice daily until skin texture normalizes then 3 times weekly. (Patient not taking: Reported on 11/7/2023), Disp: 60 g, Rfl: 0          Whom besides the patient is providing clinical information about today's encounter? NO ADDITIONAL HISTORIAN (patient alone provided history)    Physical Exam and Assessment/Plan by Diagnosis:    Patient here to follow up on Seborrheic Dermatitis. SEBORRHEIC DERMATITIS    Physical Exam:  Anatomic Location Affected:  Left scalp   Morphological Description:  scaly pink plaque, improved from before   Pertinent Positives:  Pertinent Negatives: Additional History of Present Condition:  Patient has been doing Excimer treatment for a couple of months is also using ciclopirox shampoo and synalar as needed, patient currently waiting for a home device to continue treatments. Patient has stopped doing in office excimer treatments due to billing issues.      Assessment and Plan:  Based on a thorough discussion of this condition and the management approach to it (including a comprehensive discussion of the known risks, side effects and potential benefits of treatment), the patient (family) agrees to implement the following specific plan: Will start, home phototherapy   Continue Synalar solution twice a day as needed   Will place order for home phototherapy and send to [facility]      Seborrheic Dermatitis   Seborrheic dermatitis is a common, chronic or relapsing form of eczema/dermatitis that mainly affects the sebaceous, gland-rich regions of the scalp, face, and trunk. There are infantile and adult forms of seborrhoeic dermatitis. It is sometimes associated with psoriasis and, in that clinical scenario, may be referred to as "sebo-psoriasis."  Seborrheic dermatitis is also known as "seborrheic eczema."  Dandruff (also called "pityriasis capitis") is an uninflamed form of seborrhoeic dermatitis. Dandruff presents as bran-like scaly patches scattered within hair-bearing areas of the scalp. In an infant, this condition may be referred to as "cradle cap."  The cause of seborrheic dermatitis is not completely understood. It is associated with proliferation of various species of the skin commensal Malassezia, in its yeast (non-pathogenic) form. Its metabolites (such as the fatty acids oleic acid, malssezin, and indole-3-carbaldehyde) may cause an inflammatory reaction. Differences in skin barrier lipid content and function may account for individual presentations. Infantile Seborrheic Dermatitis  Infantile seborrheic dermatitis affects babies under the age of 1 months and usually resolves by 1012 months of age. Infantile seborrheic dermatitis causes "cradle cap" (diffuse, greasy scaling on scalp). The rash may spread to affect armpit and groin folds (a type of "napkin dermatitis"). There may be associated salmon-pink colored patches that may flake or peel. The rash in this case is usually not especially itchy, so the baby often appears undisturbed by the rash, even when more generalized.     Adult Seborrheic Dermatitis  Adult seborrheic dermatitis tends to begin in late adolescence; prevalence is greatest in young adults and in the elderly. It is more common in males than in females. The following factors are sometimes associated with severe adult seborrheic dermatitis:  Oily skin  Familial tendency to seborrhoeic dermatitis or a family history of psoriasis  Immunosuppression: organ transplant recipient, human immunodeficiency virus (HIV) infection and patients with lymphoma  Neurological and psychiatric diseases: Parkinson disease, tardive dyskinesia, depression, epilepsy, facial nerve palsy, spinal cord injury and congenital disorders such as Down syndrome  Treatment for psoriasis with psoralen and ultraviolet A (PUVA) therapy  Lack of sleep  Stressful events. In adults, seborrheic dermatitis may typically affect the scalp, face (creases around the nose, behind ears, within eyebrows) and upper trunk. Typical clinical features include: Winter flares, improving in summer following sun exposure  Minimal itch most of the time  Combination oily and dry mid-facial skin  Ill-defined localized scaly patches or diffuse scale in the scalp  Blepharitis; scaly red eyelid margins  [place]-pink, thin, scaly, and ill-defined plaques in skin folds on both sides of the face  Petal or ring-shaped flaky patches on hair-line and on anterior chest  Rash in armpits, under the breasts, in the groin folds and genital creases  Superficial folliculitis (inflamed hair follicles) on cheeks and upper trunk    Seborrheic dermatitis is diagnosed by its clinical appearance and behavior. Skin biopsy may be helpful but is rarely necessary to make this diagnosis.      Scribe Attestation      I,:  Merly Myers MA am acting as a scribe while in the presence of the attending physician.:       I,:  Mark Erickson MD personally performed the services described in this documentation    as scribed in my presence.:

## 2023-11-10 ENCOUNTER — TELEPHONE (OUTPATIENT)
Dept: DERMATOLOGY | Facility: CLINIC | Age: 38
End: 2023-11-10

## 2023-11-10 NOTE — TELEPHONE ENCOUNTER
Spoke with Kamini  from [facility], spoke with her about how to send over orders for patient to get her home phototherapy.  Kamini states that a form needs to be filled out at [address] to [facility] to try and do it but needs to be done by a doctor,     For model box it will be dermapal and lamp rx is narrowband UVB     It will then ask for instructions

## 2023-11-13 ENCOUNTER — TELEPHONE (OUTPATIENT)
Age: 38
End: 2023-11-13

## 2023-11-13 NOTE — TELEPHONE ENCOUNTER
PT called stating her insurance needs to know the module, % to increase and how many days a week she needs to use the phototherapy machine for home use.

## 2023-11-15 ENCOUNTER — TELEPHONE (OUTPATIENT)
Age: 38
End: 2023-11-15

## 2023-11-15 NOTE — TELEPHONE ENCOUNTER
Patient called in a panic because she received a call from billing telling her her bill was going to collection. I have spoke with her multi times and there are several messages that she has called trying to get this bill straightened out. I sent a message to my boss and to [id number] asking that someone look into this for her and give her a call.

## 2024-02-20 ENCOUNTER — COSMETIC (OUTPATIENT)
Dept: DERMATOLOGY | Facility: CLINIC | Age: 39
End: 2024-02-20

## 2024-02-20 DIAGNOSIS — I78.1 SPIDER ANGIOMA: ICD-10-CM

## 2024-02-20 DIAGNOSIS — Z41.1 ENCOUNTER FOR COSMETIC LASER PROCEDURE: Primary | ICD-10-CM

## 2024-02-20 DIAGNOSIS — L71.9 ROSACEA: ICD-10-CM

## 2024-02-20 PROCEDURE — COSCON COSMETIC CONSULTATION: Performed by: DERMATOLOGY

## 2024-02-20 NOTE — PATIENT INSTRUCTIONS
"SPIDER TELANGIECTASIS (\"SPIDER ANGIOMA\")    Assessment and Plan:  Based on a thorough discussion of this condition and the management approach to it (including a comprehensive discussion of the known risks, side effects and potential benefits of treatment), the patient (family) agrees to implement the following specific plan:  Discussed PDL laser today.  This is a cosmetic procedure, insurance will not cover. $150 for treatment.    Spider Telangiectasis  A spider telangiectasis (plural: telangiectases) is composed of dilated blood vessels, and is clinically characterised by its spider-like appearance. It is given that name because it has a central red papule (the body of the 'spider') from which fine red lines (the spider legs) extend radially. An alternative explanation for the name explains that the red lines form a spider-like network. Other names for spider telangiectasis are spider angioma (a misnomer), spider naevus and naevus araneus.      A solitary spider telangiectasis is common in children and adults, affecting 10-15% of the population. Although they can affect people of any race, spider telangiectases are more easily seen in fair skin compared to skin of colour. Multiple spider telangiectases arise most frequently in pregnancy, in women taking a combined oral contraceptive pill, in patients with liver disease (particularly, in cirrhosis due to alcohol abuse), and in those with thyrotoxicosis.  What causes a spider telangiectasis?    Spider telangiectasis is an acquired vascular malformation. It occurs because of the failure of a tiny muscle restricting the size of an arteriole. Increased pulsating flow through the vessel (the central papule) results in the dilatation of distal vessels (the red lines).  Spider telangiectasis may arise spontaneously or may be induced by circulating oestrogen, which is increased in pregnancy, women taking combined oral contraceptives, and in those with liver disease. " Various vascular endothelial growth factors may be involved.    Spider telangiectases are often located on the face, neck, and upper chest (this has been postulated to relate to the distribution of a large vein draining the heart, the superior vena cava). Spider telangiectases may also occur on the hands, arms, or other sites. Spider telangiectases vary in size and number, tending to be larger and more numerous in people with severe liver disease when other cutaneous signs of liver disease may be present such as palmar erythema, leukonychia, and jaundice. A central dilated arteriole may be present without radial capillaries, and the capillaries may vary in diameter, length, and number. They can also be star-shaped.  The lesions may briefly bleed on trauma but otherwise do not cause any symptoms or complications.     A spider telangiectasis is diagnosed by its typical appearance. Compression of the central arteriole results in the disappearance of the radial capillaries, which rapidly refill when the compression is relieved. This is best seen through a transparent object, such as a glass slide or the lens of a contact dermatoscope.     Spider telangiectases are distinct from 'spider veins', which are blue-coloured dilated venules arising on the thighs and lower legs and often associated with varicose veins.    What is the treatment for a spider telangiectasis?  A spider telangiectasis is harmless and does not require treatment. A lesion that is unsightly can be removed by destroying the feeding central arteriole, but this may result in a small scar. Treatments to remove spider telangiectasis include:  Cryotherapy  Electrocautery  Intense pulsed light  Vascular laser.  Surgical excision is rarely necessary and inevitably leaves a scar.    Spider telangiectases can persist or disappear. In women, oestrogen-induced telangiectases often disappear within 6 months after having a baby or of stopping the combined  contraceptive pill. They can also reappear after initially successful treatment.    ROSACEA    Assessment and Plan:  Based on a thorough discussion of this condition and the management approach to it (including a comprehensive discussion of the known risks, side effects and potential benefits of treatment), the patient (family) agrees to implement the following specific plan:  3 treatments. 4-6 weeks apart. Full face. $300/tx. 3 tx for $650  Green tinted moisturizer or primer    Rosacea is a chronic rash affecting the mid-face including the nose, cheeks, chin, forehead, and eyelids. The incidence is usually greatest between the ages of 30-60 years and is more common in people with fair skin. Common characteristics include redness, telangiectasias, papules and pustules over affected areas. Rosacea may look similar to acne, but there is a lack of comedones. Occasionally the eyes may also be involved in ocular rosacea. In advanced disease, enlargement of the sebaceous glands in the nose, termed rhinophyma, may be present.     Rosacea results in red spots (papules) and sometimes pustules over the face, but unlike acne there are no blackheads, whiteheads, or cystic nodules. Patients often experience increased facial flushing with prominent blood vessels (erythematotelangiectatic rosacea) and dry, sensitive skin. These symptoms are exacerbated by sun exposure, hot or spicy foods, topical steroids and oil-based facial products.     In ocular rosacea, eyelids may be red and sore due to conjunctivitis, keratitis, and episcleritis. If rhinophyma develops due to enlargement of sebaceous glands, the patient may have an enlarged and irregularly shaped nose with prominent pores. In rosacea that is refractory to treatment, patients can develop persistent redness and swelling of the face due to lymphatic obstruction (Morbihan disease).     Distribution around the cheeks may be confused with the malar or “butterfly rash” of lupus.  However, the rash of lupus spares the nasal creases and lacks papules and pustules. If signs of photosensitivity, oral ulcers, arthritis, and kidney dysfunction are present then consider referral to a rheumatologist.     There are many potential causes of rosacea including genetic, environmental, vascular, and inflammatory factors. These include, but are not limited to:  Chronic exposure to ultraviolet radiation   Increased immune responses in the form of cathelicidins that promote vessel dilation and infiltration with white blood cells (neutrophils) into the dermis  Increased matrix metalloproteinases such as collagen and elastase that remodel normal tissue may contribute to inflammation of the skin making it thicker and harder  There is some evidence to suggest that increased numbers of demodex mites on patient skin may contribute to rosacea papules     General Treatment Approach   Avoid exacerbating factors such as heat, spicy foods, and alcohol   Use daily SPF30+ sunscreen and other methods of coverage for sun protection  Use water-based make-up   Avoid applying topical steroids to affected areas as they can cause perioral dermatitis and exacerbate rosacea     Topical Treatment Approach  Metronidazole cream or gel by itself or in combination with oral antibiotics for more severe cases  Azelaic acid cream or lotion is effective for mild inflammatory rosacea when applied twice daily to affected areas  Brimonidine gel and oxymetazoline hydrochloride cream can reduce facial redness temporarily   Ivermectin cream can treat papulopustular rosacea by controlling demodex mites and inflammation   Pimecrolimus cream or tacrolimus ointment twice a day for 2-3 months can help reduce inflammation    Oral Treatment Approach  Antibiotics such as doxycycline, minocycline, or erythromycin for 1-3 months  Clonidine and carvedilol can help reduce facial flushing and are generally well tolerated. Common side effects include low  blood pressure, gastrointestinal upset, dry eyes, blurred vision and low heart rate.   Isotretinoin at low doses can be effective for long term treatment when antibiotics fail. Side effects may make it unsuitable for some patients.   NSAIDs such as diclofenac can help reduce discomfort and redness in the skin.     Procedural/Surgical Treatment Approach   Vascular lasers or intense pulsed light treatment may be used to treat persistent telangiectasia and papulopustular rosacea  Plastic surgery and carbon dioxide lasers may be used to treat rhinophyma     PULSED DYE LASER TREATMENT    The Pulsed Dye Laser (V-Beam) delivers intense but gentle pulses of light into selectively targeted areas of the skin. This laser is used to lighten or remove unwanted or abnormal blood vessels in the skin. The pulsed dye laser may be indicated for vascular lesions, broken blood vessels and generalized facial redness caused by rosacea, among other indications.   The results of laser therapy vary on the condition that is being treated and the number of treatments required for clearance. There is no downtime however the treated area will show a reddish/bruised discoloration with swelling for usually no more than 48 hours after treatment.    Post- Op Care Instructions:  1. Avoid direct sun exposure (for 3-4 days after the procedure) and use a sunscreen of SPF 30 or higher.   2. Avoid exercise, hot tubs, saunas, spicy foods for approximately 24 hours after procedure. This will increase the blood flow to the area and make the treatment less effective.   3. Do not drink alcohol, take aspirin, ibuprofen, vitamin E, ginseng, gingko biloba, turmeric or fish oils for 3 days before and 3 days after your laser treatment. Using these may increase your likelihood of bruising.   4. Tylenol and ice packs may be used for swelling, bruising or discomfort.  5. Vaseline should be applied if there is crusting/scabbing. DO NOT pick or scratch the area; let  the scab fall off on its own.   6. Do not use scrubs, harsh exfoliants to the treated area for one week.       Side effects:  The treated area may show mild swelling, reddish/bruised discoloration. and redness. Occasionally purpura, laser bruise, may occur; however it will disappear in a few days. The treated area is delicate and should be treated with care. Keep icing the area for swelling. For facial swelling, sleep with your head elevated. .    When any discoloration or bruising clears, there may be very little change in the treated area. Improvement will take place slowly over a period of weeks. Continue with your set of treatments as discussed for optimal results

## 2024-02-20 NOTE — PROGRESS NOTES
"[facility] Dermatology Clinic Note     Patient Name: Sintia Jordan  Encounter Date: 2/20/2024     Have you been cared for by a [facility] Dermatologist in the last 3 years and, if so, which description applies to you?    Yes.  I have been here within the last 3 years, and my medical history has NOT changed since that time.  I am FEMALE/of child-bearing potential.    REVIEW OF SYSTEMS:  Have you recently had or currently have any of the following? No changes in my recent health.   PAST MEDICAL HISTORY:  Have you personally ever had or currently have any of the following?  If \"YES,\" then please provide more detail. No changes in my medical history.   HISTORY OF IMMUNOSUPPRESSION: Do you have a history of any of the following:  Systemic Immunosuppression such as Diabetes, Biologic or Immunotherapy, Chemotherapy, Organ Transplantation, Bone Marrow Transplantation?  No     Answering \"YES\" requires the addition of the dotphrase \"IMMUNOSUPPRESSED\" as the first diagnosis of the patient's visit.   FAMILY HISTORY:  Any \"first degree relatives\" (parent, brother, sister, or child) with the following?    No changes in my family's known health.   PATIENT EXPERIENCE:    Do you want the Dermatologist to perform a COMPLETE skin exam today including a clinical examination under the \"bra and underwear\" areas?  NO  If necessary, do we have your permission to call and leave a detailed message on your Preferred Phone number that includes your specific medical information?  Yes      Allergies   Allergen Reactions    Erythromycin Anaphylaxis and Rash     Reaction Date: 25May2011;     Pollen Extract Sneezing      Current Outpatient Medications:     Ciclopirox 1 % shampoo, Apply topically Daily for 2 weeks straight and then on \"Mondays, Wednesdays and Fridays\":  Lather into scalp and leave on for 5 minutes and then rinse off completely., Disp: 120 mL, Rfl: 11    clobetasol (TEMOVATE) 0.05 % ointment, Use a pea sized amount to affected " area twice daily until skin texture normalizes then 3 times weekly. (Patient not taking: Reported on 11/7/2023), Disp: 60 g, Rfl: 0    clobetasol (TEMOVATE) 0.05 % ointment, Apply a pea sized amount to affected area twice daily until skin texture normalizes which may take 2-4 months, then apply 3 times weekly., Disp: 60 g, Rfl: 0    fluocinolone (Synalar) 0.01 % external solution, Apply topically 2 (two) times a day As needed, Disp: 60 mL, Rfl: 3    lisinopril (ZESTRIL) 5 mg tablet, Take 5 mg by mouth daily, Disp: , Rfl:     LORazepam (ATIVAN) 0.5 mg tablet, Take 0.5 mg by mouth 2 (two) times a day as needed, Disp: , Rfl:     metroNIDAZOLE (METROGEL) 0.75 % gel, Apply topically 2 (two) times a day, Disp: 45 g, Rfl: 11    sertraline (ZOLOFT) 50 mg tablet, Take 25 mg by mouth, Disp: , Rfl:     SUMAtriptan (IMITREX) 50 mg tablet, , Disp: , Rfl:     triamcinolone (KENALOG) 0.5 % cream, Apply 1 application. topically 2 (two) times a day To affected area, Disp: , Rfl:         Whom besides the patient is providing clinical information about today's encounter?   NO ADDITIONAL HISTORIAN (patient alone provided history)    Physical Exam and Assessment/Plan by Diagnosis:    Spider angioma    Physical Exam:  Anatomic Location Affected:  tip of nose  Morphological Description:  starburst cluster of red vessels     Additional History of Present Condition:  patient notes has been present her entire life. She reports in her early 20's she was advised it is a blood vessel. She would like treatment today.    Assessment and Plan:  Based on a thorough discussion of this condition and the management approach to it (including a comprehensive discussion of the known risks, side effects and potential benefits of treatment), the patient (family) agrees to implement the following specific plan:  Discussed PDL laser today.  This is a cosmetic procedure, insurance will not cover. $150 for treatment.  ROSACEA    Physical Exam:  Anatomic  Location Affected:  cheeks  Morphological Description:  diffuse erythema     Additional History of Present Condition:  patient would like to know treatment options.    Assessment and Plan:  Based on a thorough discussion of this condition and the management approach to it (including a comprehensive discussion of the known risks, side effects and potential benefits of treatment), the patient (family) agrees to implement the following specific plan:  Cosmetic consult done today  3 treatments. 4 weeks apart. Full face. $300/tx. 3 tx for $650  Discussed Rhofade - patient deferred for now and will think about PDL         PDL Laser Treatment    2/20/2024  Device: Lida V Beam Prima   Place of Treatment: [facility]    Surgeon and :  Dr. Adam  Assistant: Rossy Danielle    Treatment number: 1  Site of treatment: tip of nose    Pre-operative Diagnosis:   Indications for Surgery:    Post-operative Diagnosis: same as pre-operative    Anesthetic:  none     Safety Precautions:  Fire extinguisher persent/Window covered/Staff and patient eyes covered/Warning sign posted     Interim History/Comments:  none  Percent Improvement: N/A    Parameters:   Fluence: 8 J/cm2  Pulse duration: 1.5 msec  Spot size: 5 mm    Procedure Note:   After discussing potential procedure related risks including but not limited to pain, purpura, blistering, scarring, discoloration, “footprinting,” recurrence, inefficacy, need for additional treatment, and undesirable cosmetic results, written and verbal consent were obtained.  Patient was brought back to the operating room. Time out was performed.  Patient's name, identification and intended procedure were verified. Prior to the procedure, the patient cleansed the treatment area. The treatment area was re-cleansed with alcohol. Eye coverings eye shield inserted/placed.      Tolerance: Patient tolerated the procedure well with no immediate significant complications and left in stable  condition.   Complications: none  Other procedures: none  Photography: yes         Post-op Care: Aftercare was discussed. Continue to ice at home and keep the area moist with a gentle moisturizer. Advised the patient to avoid exercise for the next 24 hours and also to be cautious with sun exposure over the next week. Advised patient to call the office if there is excessive swelling.   Follow-up:  Patient is aware that it will take multiple treatments to treat this condition. Return to clinic for re-treatment in 2 weeks.    THIS WAS A COSMETIC TREATMENT AND PATIENT PAID OUT OF POCKET, DO NOT BILL INSURANCE OR PATIENT.   AMOUNT PAID: $150     Scribe Attestation      I,:  Tila Danielle am acting as a scribe while in the presence of the attending physician.:       I,:  Franchesca Adam MD personally performed the services described in this documentation    as scribed in my presence.:

## 2024-02-21 PROBLEM — Z01.419 ENCOUNTER FOR GYNECOLOGICAL EXAMINATION (GENERAL) (ROUTINE) WITHOUT ABNORMAL FINDINGS: Status: RESOLVED | Noted: 2019-11-06 | Resolved: 2024-02-21

## 2024-03-05 ENCOUNTER — OFFICE VISIT (OUTPATIENT)
Dept: DERMATOLOGY | Facility: CLINIC | Age: 39
End: 2024-03-05

## 2024-03-05 DIAGNOSIS — Z41.1 ENCOUNTER FOR COSMETIC LASER PROCEDURE: ICD-10-CM

## 2024-03-05 DIAGNOSIS — I78.1 SPIDER ANGIOMA: Primary | ICD-10-CM

## 2024-03-05 PROCEDURE — BUNDLE PR BUNDLE: Performed by: DERMATOLOGY

## 2024-03-05 NOTE — PROGRESS NOTES
"[facility] Dermatology Clinic Note     Patient Name: Sintia Jordan  Encounter Date: 3/5/2024     Have you been cared for by a [facility] Dermatologist in the last 3 years and, if so, which description applies to you?    Yes.  I have been here within the last 3 years, and my medical history has NOT changed since that time.  I am FEMALE/of child-bearing potential.    REVIEW OF SYSTEMS:  Have you recently had or currently have any of the following? No changes in my recent health.   PAST MEDICAL HISTORY:  Have you personally ever had or currently have any of the following?  If \"YES,\" then please provide more detail. No changes in my medical history.   HISTORY OF IMMUNOSUPPRESSION: Do you have a history of any of the following:  Systemic Immunosuppression such as Diabetes, Biologic or Immunotherapy, Chemotherapy, Organ Transplantation, Bone Marrow Transplantation?  No     Answering \"YES\" requires the addition of the dotphrase \"IMMUNOSUPPRESSED\" as the first diagnosis of the patient's visit.   FAMILY HISTORY:  Any \"first degree relatives\" (parent, brother, sister, or child) with the following?    No changes in my family's known health.   PATIENT EXPERIENCE:    Do you want the Dermatologist to perform a COMPLETE skin exam today including a clinical examination under the \"bra and underwear\" areas?  NO  If necessary, do we have your permission to call and leave a detailed message on your Preferred Phone number that includes your specific medical information?  Yes      Allergies   Allergen Reactions    Erythromycin Anaphylaxis and Rash     Reaction Date: 25May2011;     Pollen Extract Sneezing      Current Outpatient Medications:     Ciclopirox 1 % shampoo, Apply topically Daily for 2 weeks straight and then on \"Mondays, Wednesdays and Fridays\":  Lather into scalp and leave on for 5 minutes and then rinse off completely., Disp: 120 mL, Rfl: 11    clobetasol (TEMOVATE) 0.05 % ointment, Use a pea sized amount to affected " "area twice daily until skin texture normalizes then 3 times weekly. (Patient not taking: Reported on 11/7/2023), Disp: 60 g, Rfl: 0    clobetasol (TEMOVATE) 0.05 % ointment, Apply a pea sized amount to affected area twice daily until skin texture normalizes which may take 2-4 months, then apply 3 times weekly., Disp: 60 g, Rfl: 0    fluocinolone (Synalar) 0.01 % external solution, Apply topically 2 (two) times a day As needed, Disp: 60 mL, Rfl: 3    lisinopril (ZESTRIL) 5 mg tablet, Take 5 mg by mouth daily, Disp: , Rfl:     LORazepam (ATIVAN) 0.5 mg tablet, Take 0.5 mg by mouth 2 (two) times a day as needed, Disp: , Rfl:     metroNIDAZOLE (METROGEL) 0.75 % gel, Apply topically 2 (two) times a day, Disp: 45 g, Rfl: 11    sertraline (ZOLOFT) 50 mg tablet, Take 25 mg by mouth, Disp: , Rfl:     SUMAtriptan (IMITREX) 50 mg tablet, , Disp: , Rfl:     triamcinolone (KENALOG) 0.5 % cream, Apply 1 application. topically 2 (two) times a day To affected area, Disp: , Rfl:         Whom besides the patient is providing clinical information about today's encounter?   NO ADDITIONAL HISTORIAN (patient alone provided history)    Physical Exam and Assessment/Plan by Diagnosis:    SPIDER TELANGIECTASIS (\"SPIDER ANGIOMA\")    Physical Exam:  Anatomic Location Affected:  tip of nose  Morphological Description:  smaller and lighter since last visit   Pertinent Positives:  Pertinent Negatives:    Additional History of Present Condition:  patient last seen in office on 2/202/24. PDL was done at that time. Patient reports she has noticed fading in color every day since treatment. She notes spot is still visible but greatly improved.    Assessment and Plan:  Based on a thorough discussion of this condition and the management approach to it (including a comprehensive discussion of the known risks, side effects and potential benefits of treatment), the patient (family) agrees to implement the following specific plan:  Treatment #2 on PDL " today          PDL Laser Treatment     3/5/2024  Device: Lida V Beam Prima   Place of Treatment: [facility]     Surgeon and :  Dr. Adam  Assistant: Rossy Danielle     Treatment number: 2  Site of treatment: tip of nose     Pre-operative Diagnosis:   Indications for Surgery:    Post-operative Diagnosis: same as pre-operative     Anesthetic:  none     Safety Precautions:  Fire extinguisher persent/Window covered/Staff and patient eyes covered/Warning sign posted     Interim History/Comments:  none  Percent Improvement: N/A     Parameters:   Fluence: 13 J/cm2  Pulse duration: 40 msec  Spot size: linear     Procedure Note:   After discussing potential procedure related risks including but not limited to pain, purpura, blistering, scarring, discoloration, “footprinting,” recurrence, inefficacy, need for additional treatment, and undesirable cosmetic results, written and verbal consent were obtained.  Patient was brought back to the operating room. Time out was performed.  Patient's name, identification and intended procedure were verified. Prior to the procedure, the patient cleansed the treatment area. The treatment area was re-cleansed with alcohol. Eye coverings eye shield inserted/placed.      Tolerance: Patient tolerated the procedure well with no immediate significant complications and left in stable condition.   Complications: none  Other procedures: none  Photography: yes    Follow up as needed    NO CHARGE- patient paid full treatment price at last visit  Do not bill insurance or patient     Scribe Attestation      I,:  Tila Danielle am acting as a scribe while in the presence of the attending physician.:       I,:  Franchesca Adam MD personally performed the services described in this documentation    as scribed in my presence.:

## 2024-10-15 ENCOUNTER — OFFICE VISIT (OUTPATIENT)
Dept: URGENT CARE | Facility: MEDICAL CENTER | Age: 39
End: 2024-10-15
Payer: COMMERCIAL

## 2024-10-15 VITALS
DIASTOLIC BLOOD PRESSURE: 71 MMHG | HEART RATE: 106 BPM | BODY MASS INDEX: 39.26 KG/M2 | TEMPERATURE: 98 F | RESPIRATION RATE: 18 BRPM | SYSTOLIC BLOOD PRESSURE: 138 MMHG | WEIGHT: 201 LBS | OXYGEN SATURATION: 99 %

## 2024-10-15 DIAGNOSIS — R11.2 NAUSEA AND VOMITING, UNSPECIFIED VOMITING TYPE: ICD-10-CM

## 2024-10-15 DIAGNOSIS — H65.03 NON-RECURRENT ACUTE SEROUS OTITIS MEDIA OF BOTH EARS: Primary | ICD-10-CM

## 2024-10-15 DIAGNOSIS — R42 DIZZINESS: ICD-10-CM

## 2024-10-15 PROCEDURE — 99214 OFFICE O/P EST MOD 30 MIN: CPT | Performed by: PHYSICIAN ASSISTANT

## 2024-10-15 RX ORDER — FLUTICASONE PROPIONATE 50 MCG
2 SPRAY, SUSPENSION (ML) NASAL DAILY
Qty: 9.9 ML | Refills: 0 | Status: SHIPPED | OUTPATIENT
Start: 2024-10-15

## 2024-10-15 RX ORDER — MECLIZINE HYDROCHLORIDE 25 MG/1
25 TABLET ORAL EVERY 12 HOURS PRN
Qty: 30 TABLET | Refills: 0 | Status: SHIPPED | OUTPATIENT
Start: 2024-10-15

## 2024-10-16 NOTE — PATIENT INSTRUCTIONS
Please gently attempt to pop ears as discussed.  Nasal spray for the next 5-7 days  Please go to the ER if symptoms worsen and please follow up with family doctor if symptoms persist

## 2024-10-16 NOTE — PROGRESS NOTES
"  [facility]        NAME: Sintia Jordan is a 39 y.o. female  : 1985    MRN: [id number]  DATE: October 15, 2024  TIME: 9:17 PM    Assessment and Plan   Non-recurrent acute serous otitis media of both ears [H65.03]  1. Non-recurrent acute serous otitis media of both ears  fluticasone (FLONASE) 50 mcg/act nasal spray      2. Nausea and vomiting, unspecified vomiting type        3. Dizziness  meclizine (ANTIVERT) 25 mg tablet            Patient Instructions   Please gently attempt to pop ears as discussed.  Nasal spray for the next 5-7 days  Please go to the ER if symptoms worsen and please follow up with family doctor if symptoms persist    Follow up with PCP in 3-5 days.  Proceed to  ER if symptoms worsen.    If tests are performed, our office will contact you with results only if changes need to made to the care plan discussed with you at the visit. [facility]    Chief Complaint     Chief Complaint   Patient presents with    Ear Fullness     Pt. C/O left ear fullness and ringing with associated N/V.          History of Present Illness       Pt presents with right ear feeling clogged and full with tinnitus as well as feeling nausea and just feeling \"off.\" Pt reports episode of vomiting tonight due to not feeling well. Pt reports she is unable to \"pop\" the right ear. She denies headache, chest pain, abdominal pain, shortness of breath, sick symptoms such as cough or congestion, diarrhea. She reports scrolling on phone worsens symptoms, denies changes with position, head turning.    Ear Fullness   Associated symptoms include vomiting. Pertinent negatives include no abdominal pain, coughing, diarrhea, headaches, rash or sore throat.       Review of Systems   Review of Systems   Constitutional:  Negative for fever.   HENT:  Positive for ear pain and tinnitus. Negative for congestion and sore throat.    Respiratory:  Negative for cough and shortness of " "breath.    Cardiovascular:  Negative for chest pain.   Gastrointestinal:  Positive for nausea and vomiting. Negative for abdominal pain and diarrhea.   Skin:  Negative for rash.   Neurological:  Negative for headaches.         Current Medications       Current Outpatient Medications:     Ciclopirox 1 % shampoo, Apply topically Daily for 2 weeks straight and then on \"Mondays, Wednesdays and Fridays\":  Lather into scalp and leave on for 5 minutes and then rinse off completely., Disp: 120 mL, Rfl: 11    clobetasol (TEMOVATE) 0.05 % ointment, Apply a pea sized amount to affected area twice daily until skin texture normalizes which may take 2-4 months, then apply 3 times weekly., Disp: 60 g, Rfl: 0    fluocinolone (Synalar) 0.01 % external solution, Apply topically 2 (two) times a day As needed, Disp: 60 mL, Rfl: 3    fluticasone (FLONASE) 50 mcg/act nasal spray, 2 sprays into each nostril daily, Disp: 9.9 mL, Rfl: 0    lisinopril (ZESTRIL) 5 mg tablet, Take 5 mg by mouth daily, Disp: , Rfl:     LORazepam (ATIVAN) 0.5 mg tablet, Take 0.5 mg by mouth 2 (two) times a day as needed, Disp: , Rfl:     meclizine (ANTIVERT) 25 mg tablet, Take 1 tablet (25 mg total) by mouth every 12 (twelve) hours as needed for dizziness, Disp: 30 tablet, Rfl: 0    metroNIDAZOLE (METROGEL) 0.75 % gel, Apply topically 2 (two) times a day, Disp: 45 g, Rfl: 11    sertraline (ZOLOFT) 50 mg tablet, Take 25 mg by mouth, Disp: , Rfl:     SUMAtriptan (IMITREX) 50 mg tablet, , Disp: , Rfl:     triamcinolone (KENALOG) 0.5 % cream, Apply 1 application. topically 2 (two) times a day To affected area, Disp: , Rfl:     clobetasol (TEMOVATE) 0.05 % ointment, Use a pea sized amount to affected area twice daily until skin texture normalizes then 3 times weekly. (Patient not taking: Reported on 11/7/2023), Disp: 60 g, Rfl: 0    Current Allergies     Allergies as of 10/15/2024 - Reviewed 10/15/2024   Allergen Reaction Noted    Erythromycin Anaphylaxis and Rash " 04/22/2012    Pollen extract Sneezing 11/15/2013            The following portions of the patient's history were reviewed and updated as appropriate: allergies, current medications, past family history, past medical history, past social history, past surgical history and problem list.     Past Medical History:   Diagnosis Date    Bronchitis     Gestational diabetes     gd    Migraines     Last Assessed:11/15/13    Normal delivery     2014 daughter    Varicella     childhood    Vulvar dystrophy     Last Assessed:9/19/17       Past Surgical History:   Procedure Laterality Date    TOOTH EXTRACTION         Family History   Problem Relation Age of Onset    Thyroid disease Mother     Migraines Mother     Hypertension Mother     No Known Problems Father     Asthma Brother     Diabetes Maternal Grandmother     Heart disease Maternal Grandmother     Heart attack Maternal Grandmother     Ovarian cancer Maternal Aunt     Breast cancer Neg Hx     Colon cancer Neg Hx     Uterine cancer Neg Hx     Cervical cancer Neg Hx          Medications have been verified.        Objective   /71   Pulse (!) 106   Temp 98 °F (36.7 °C)   Resp 18   Wt 91.2 kg (201 lb)   SpO2 99%   BMI 39.26 kg/m²        Physical Exam     Physical Exam  HENT:      Right Ear: Ear canal and external ear normal. No laceration, drainage, swelling or tenderness. A middle ear effusion is present. No foreign body. No PE tube. No hemotympanum. Tympanic membrane is bulging. Tympanic membrane is not injected, scarred, perforated, erythematous or retracted. Tympanic membrane has normal mobility.      Left Ear: Ear canal and external ear normal. No laceration, drainage, swelling or tenderness. A middle ear effusion is present. There is no impacted cerumen. No foreign body. No PE tube. No hemotympanum. Tympanic membrane is bulging (acu ser ot med). Tympanic membrane is not injected, scarred, perforated, erythematous or retracted. Tympanic membrane has normal  mobility.      Ears:      Comments: Serous otitis media noted bilaterally  Neurological:      Mental Status: She is alert and oriented to person, place, and time. Mental status is at baseline.      Cranial Nerves: Cranial nerves 2-12 are intact. No cranial nerve deficit, dysarthria or facial asymmetry.      Sensory: Sensation is intact.      Motor: Motor function is intact. No weakness, tremor, atrophy, abnormal muscle tone, seizure activity or pronator drift.      Coordination: Coordination is intact. Romberg sign negative.      Gait: Gait is intact.

## 2024-11-10 ENCOUNTER — HOSPITAL ENCOUNTER (EMERGENCY)
Facility: HOSPITAL | Age: 39
Discharge: HOME | End: 2024-11-10
Attending: EMERGENCY MEDICINE
Payer: COMMERCIAL

## 2024-11-10 VITALS
HEART RATE: 119 BPM | OXYGEN SATURATION: 98 % | TEMPERATURE: 98.2 F | RESPIRATION RATE: 18 BRPM | SYSTOLIC BLOOD PRESSURE: 146 MMHG | DIASTOLIC BLOOD PRESSURE: 86 MMHG | WEIGHT: 194.45 LBS | BODY MASS INDEX: 37.98 KG/M2

## 2024-11-10 DIAGNOSIS — G43.909 MIGRAINE HEADACHE: Primary | ICD-10-CM

## 2024-11-10 PROCEDURE — 96372 THER/PROPH/DIAG INJ SC/IM: CPT

## 2024-11-10 PROCEDURE — 99283 EMERGENCY DEPT VISIT LOW MDM: CPT

## 2024-11-10 PROCEDURE — 99284 EMERGENCY DEPT VISIT MOD MDM: CPT | Performed by: EMERGENCY MEDICINE

## 2024-11-10 RX ORDER — METOCLOPRAMIDE HYDROCHLORIDE 5 MG/ML
10 INJECTION INTRAMUSCULAR; INTRAVENOUS ONCE
Status: DISCONTINUED | OUTPATIENT
Start: 2024-11-10 | End: 2024-11-10

## 2024-11-10 RX ORDER — KETOROLAC TROMETHAMINE 30 MG/ML
30 INJECTION, SOLUTION INTRAMUSCULAR; INTRAVENOUS ONCE
Status: COMPLETED | OUTPATIENT
Start: 2024-11-10 | End: 2024-11-10

## 2024-11-10 RX ORDER — METOCLOPRAMIDE HYDROCHLORIDE 5 MG/ML
5 INJECTION INTRAMUSCULAR; INTRAVENOUS ONCE
Status: COMPLETED | OUTPATIENT
Start: 2024-11-10 | End: 2024-11-10

## 2024-11-10 RX ORDER — DIPHENHYDRAMINE HCL 25 MG
25 TABLET ORAL ONCE
Status: COMPLETED | OUTPATIENT
Start: 2024-11-10 | End: 2024-11-10

## 2024-11-10 RX ADMIN — KETOROLAC TROMETHAMINE 30 MG: 30 INJECTION, SOLUTION INTRAMUSCULAR; INTRAVENOUS at 18:59

## 2024-11-10 RX ADMIN — METOCLOPRAMIDE 5 MG: 5 INJECTION, SOLUTION INTRAMUSCULAR; INTRAVENOUS at 19:00

## 2024-11-10 RX ADMIN — DIPHENHYDRAMINE HYDROCHLORIDE 25 MG: 25 TABLET ORAL at 18:58

## 2024-11-10 NOTE — Clinical Note
Sintia Jordan was seen and treated in our emergency department on 11/10/2024.    No restrictions            Diagnosis:     Sintia  may return to work on return date.    She may return on this date: 11/12/2024         If you have any questions or concerns, please don't hesitate to call.      Vic Sullivan MD    ______________________________           _______________          _______________  Hospital Representative                              Date                                Time

## 2024-11-10 NOTE — ED PROVIDER NOTES
Time reflects when diagnosis was documented in both MDM as applicable and the Disposition within this note       Time User Action Codes Description Comment    11/10/2024  7:20 PM Vic Sullivan Add [G43.909] Migraine headache           ED Disposition       ED Disposition   Discharge    Condition   Stable    Date/Time   Sun Nov 10, 2024  7:20 PM    Comment   Sintia Jordan discharge to home/self care.                   Assessment & Plan       Medical Decision Making  Female patient who presented to the emergency department with migraine.  On physical examination, patient was noted to be in no acute distress without any focal neurologic deficits.  While in the emergency department, patient agreed upon receiving IM injection of Reglan, and oral Benadryl.  Following medication administration, patient was ready for discharge.  She was advised to follow-up outpatient with PCP as needed and to return to the emergency department if her symptoms worsen.  Patient was agreeable to plan.    Risk  OTC drugs.  Prescription drug management.             Medications   ketorolac (TORADOL) injection 30 mg (30 mg Intramuscular Given 11/10/24 1859)   diphenhydrAMINE (BENADRYL) tablet 25 mg (25 mg Oral Given 11/10/24 1858)   metoclopramide (REGLAN) injection 5 mg (5 mg Intramuscular Given 11/10/24 1900)       ED Risk Strat Scores                                               History of Present Illness       Chief Complaint   Patient presents with    Headache - Recurrent or Known Dx Migraines     Pt comes to ED c/o migraine starting this morning when she woke up. States she has a hx of migraines and she takes sumatriptan but no relief. +nausea       Past Medical History:   Diagnosis Date    Bronchitis     Gestational diabetes     gd    Migraines     Last Assessed:11/15/13    Normal delivery     2014 daughter    Varicella     childhood    Vulvar dystrophy     Last Assessed:9/19/17      Past Surgical History:   Procedure Laterality Date     TOOTH EXTRACTION        Family History   Problem Relation Age of Onset    Thyroid disease Mother     Migraines Mother     Hypertension Mother     No Known Problems Father     Asthma Brother     Diabetes Maternal Grandmother     Heart disease Maternal Grandmother     Heart attack Maternal Grandmother     Ovarian cancer Maternal Aunt     Breast cancer Neg Hx     Colon cancer Neg Hx     Uterine cancer Neg Hx     Cervical cancer Neg Hx       Social History     Tobacco Use    Smoking status: Former     Current packs/day: 0.00     Average packs/day: 0.3 packs/day for 10.0 years (2.5 ttl pk-yrs)     Types: Cigarettes     Start date: 2003     Quit date: 2013     Years since quittin.8    Smokeless tobacco: Never   Vaping Use    Vaping status: Never Used   Substance Use Topics    Alcohol use: No    Drug use: No      E-Cigarette/Vaping    E-Cigarette Use Never User       E-Cigarette/Vaping Substances    Nicotine No     THC No     CBD No     Flavoring No     Other No     Unknown No       I have reviewed and agree with the history as documented.     39-year-old female with significant PMH including 2 presents to the emergency department with headache that started this morning.  Patient states that this which she describes as a throbbing pain throughout her whole head.  She states that the pain is made worse when bending forward.  It has been a while since she had one this bad.  Patient rates the pain 8 out of 10 on a pain scale denies blurry vision, lightheadedness or dizziness.  She states that she has mild nausea due to forcing herself to vomit which sometimes relieves her pain.  Patient reported has tried drinking water, causing herself to vomit, taking her sumatriptan with her last dose being 5 PM, and a facemask that she use to help relieve her migraines.  She states that none of her supportive treatments at home have helped.  No other acute concerns at this time. Denies chest pain, SOB, cough, abdominal pain,  diarrhea, fever, chills, dizziness, lightheadedness, dysuria, hematuria, hematochezia, or melena.                 Headache - Recurrent or Known Dx Migraines  Associated symptoms: nausea and vomiting    Associated symptoms: no abdominal pain, no back pain, no congestion, no cough, no diarrhea, no dizziness, no drainage, no ear pain, no eye pain, no fatigue, no fever, no seizures, no sore throat and no weakness        Review of Systems   Constitutional:  Positive for appetite change. Negative for chills, diaphoresis, fatigue and fever.   HENT:  Negative for congestion, ear pain, postnasal drip, rhinorrhea, sore throat and trouble swallowing.    Eyes:  Negative for pain and visual disturbance.   Respiratory:  Negative for cough and shortness of breath.    Cardiovascular:  Negative for chest pain and palpitations.   Gastrointestinal:  Positive for nausea and vomiting. Negative for abdominal pain, constipation and diarrhea.   Genitourinary:  Negative for decreased urine volume, dysuria and hematuria.   Musculoskeletal:  Negative for arthralgias and back pain.   Skin:  Negative for color change and rash.   Neurological:  Positive for headaches. Negative for dizziness, seizures, syncope, weakness and light-headedness.   All other systems reviewed and are negative.          Objective       ED Triage Vitals [11/10/24 1825]   Temperature Pulse Blood Pressure Respirations SpO2 Patient Position - Orthostatic VS   98.2 °F (36.8 °C) (!) 119 146/86 18 98 % --      Temp Source Heart Rate Source BP Location FiO2 (%) Pain Score    Oral Monitor -- -- 10 - Worst Possible Pain      Vitals      Date and Time Temp Pulse SpO2 Resp BP Pain Score FACES Pain Rating User   11/10/24 1859 -- -- -- -- -- 8 -- AP   11/10/24 1825 98.2 °F (36.8 °C) 119 98 % 18 146/86 10 - Worst Possible Pain -- CH            Physical Exam  Vitals and nursing note reviewed.   Constitutional:       General: She is not in acute distress.     Appearance: Normal  appearance. She is normal weight.   HENT:      Head: Normocephalic and atraumatic.      Right Ear: External ear normal.      Left Ear: External ear normal.      Nose: Nose normal.      Mouth/Throat:      Pharynx: Oropharynx is clear.   Eyes:      Extraocular Movements: Extraocular movements intact.      Conjunctiva/sclera: Conjunctivae normal.      Pupils: Pupils are equal, round, and reactive to light.   Cardiovascular:      Rate and Rhythm: Normal rate and regular rhythm.      Pulses: Normal pulses.      Heart sounds: Normal heart sounds.      Comments: RRR with +S1 and S2, no murmurs appreciated on exam. Peripheral pulses intact.    Pulmonary:      Effort: Pulmonary effort is normal. No respiratory distress.      Breath sounds: Normal breath sounds. No wheezing, rhonchi or rales.      Comments: CTABL with no abnormal lung sounds such as wheezes or rales appreciated on exam.     Abdominal:      General: Abdomen is flat. Bowel sounds are normal. There is no distension.      Palpations: Abdomen is soft.      Tenderness: There is no abdominal tenderness.      Comments: Soft, non tender, normo-active bowel sounds. Without rigidity, guarding, or distension.     Musculoskeletal:         General: Normal range of motion.      Cervical back: Normal range of motion. No tenderness.   Skin:     General: Skin is warm and dry.   Neurological:      General: No focal deficit present.      Mental Status: She is alert and oriented to person, place, and time. Mental status is at baseline.      Cranial Nerves: No cranial nerve deficit.      Sensory: No sensory deficit.      Motor: No weakness.      Coordination: Coordination normal.      Gait: Gait normal.      Comments: CN grossly intact on visualization. No focal neurologic deficits noted on exam.  5/5 strength in all extremities. Neurovascularly intact with normal sensation and motor function.             Results Reviewed       None            No orders to display  "      Procedures    ED Medication and Procedure Management   Prior to Admission Medications   Prescriptions Last Dose Informant Patient Reported? Taking?   Ciclopirox 1 % shampoo   No No   Sig: Apply topically Daily for 2 weeks straight and then on \",  and \":  Lather into scalp and leave on for 5 minutes and then rinse off completely.   LORazepam (ATIVAN) 0.5 mg tablet  Self Yes No   Sig: Take 0.5 mg by mouth 2 (two) times a day as needed   SUMAtriptan (IMITREX) 50 mg tablet   Yes No   clobetasol (TEMOVATE) 0.05 % ointment   No No   Sig: Apply a pea sized amount to affected area twice daily until skin texture normalizes which may take 2-4 months, then apply 3 times weekly.   fluocinolone (Synalar) 0.01 % external solution   No No   Sig: Apply topically 2 (two) times a day As needed   fluticasone (FLONASE) 50 mcg/act nasal spray   No No   Si sprays into each nostril daily   lisinopril (ZESTRIL) 5 mg tablet  Self Yes No   Sig: Take 5 mg by mouth daily   metroNIDAZOLE (METROGEL) 0.75 % gel   No No   Sig: Apply topically 2 (two) times a day   sertraline (ZOLOFT) 50 mg tablet  Self Yes No   Sig: Take 25 mg by mouth   triamcinolone (KENALOG) 0.5 % cream   Yes No   Sig: Apply 1 application. topically 2 (two) times a day To affected area      Facility-Administered Medications: None     Discharge Medication List as of 11/10/2024  7:23 PM        CONTINUE these medications which have NOT CHANGED    Details   Ciclopirox 1 % shampoo Apply topically Daily for 2 weeks straight and then on \",  and \":  Lather into scalp and leave on for 5 minutes and then rinse off completely., Normal      !! clobetasol (TEMOVATE) 0.05 % ointment Apply a pea sized amount to affected area twice daily until skin texture normalizes which may take 2-4 months, then apply 3 times weekly., Normal      fluocinolone (Synalar) 0.01 % external solution Apply topically 2 (two) times a day As needed, Starting " Tue 6/6/2023, Normal      fluticasone (FLONASE) 50 mcg/act nasal spray 2 sprays into each nostril daily, Starting Tue 10/15/2024, Normal      lisinopril (ZESTRIL) 5 mg tablet Take 5 mg by mouth daily, Starting Fri 7/15/2022, Historical Med      LORazepam (ATIVAN) 0.5 mg tablet Take 0.5 mg by mouth 2 (two) times a day as needed, Starting Wed 7/20/2022, Historical Med      metroNIDAZOLE (METROGEL) 0.75 % gel Apply topically 2 (two) times a day, Starting Tue 6/6/2023, Normal      sertraline (ZOLOFT) 50 mg tablet Take 25 mg by mouth, Starting Mon 8/1/2022, Historical Med      SUMAtriptan (IMITREX) 50 mg tablet Starting Thu 1/5/2023, Historical Med      triamcinolone (KENALOG) 0.5 % cream Apply 1 application. topically 2 (two) times a day To affected area, Starting Fri 4/28/2023, Historical Med      !! clobetasol (TEMOVATE) 0.05 % ointment Use a pea sized amount to affected area twice daily until skin texture normalizes then 3 times weekly., Normal      meclizine (ANTIVERT) 25 mg tablet Take 1 tablet (25 mg total) by mouth every 12 (twelve) hours as needed for dizziness, Starting Tue 10/15/2024, Normal       !! - Potential duplicate medications found. Please discuss with provider.        No discharge procedures on file.  ED SEPSIS DOCUMENTATION   Time reflects when diagnosis was documented in both MDM as applicable and the Disposition within this note       Time User Action Codes Description Comment    11/10/2024  7:20 PM Vic Sullivan [G43.909] Migraine headache                  Vic Sullivan MD  11/13/24 0852

## 2024-11-11 RX ORDER — CLOBETASOL PROPIONATE 0.5 MG/G
OINTMENT TOPICAL
Qty: 60 G | Refills: 1 | Status: CANCELLED | OUTPATIENT
Start: 2024-11-11

## 2024-11-11 NOTE — ED ATTENDING ATTESTATION
11/10/2024  I, Floresita Encarnacion MD, saw and evaluated the patient. I have discussed the patient with the resident/non-physician practitioner and agree with the resident's/non-physician practitioner's findings, Plan of Care, and MDM as documented in the resident's/non-physician practitioner's note, except where noted. All available labs and Radiology studies were reviewed.  I was present for key portions of any procedure(s) performed by the resident/non-physician practitioner and I was immediately available to provide assistance.       At this point I agree with the current assessment done in the Emergency Department.  I have conducted an independent evaluation of this patient a history and physical is as follows:    39-year-old female presenting to the ER with a headache, physic previous migraines.  Has had similar headaches in the past.  No neck pain.  Neck is supple.  No trauma.  No fevers or chills.  Nonfocal neuroexam.  Not on blood thinners.  Requesting medications for pain and to go home.    Likely migraine, no concerning signs or symptoms, outpatient follow-up appropriate, will treat.        ED Course         Critical Care Time  Procedures

## 2024-11-11 NOTE — PROGRESS NOTES
Assessment/Plan:  Calcium 1000 mg (in divided doses-max 600 mg at one time) + 600-1000 IU Vit D daily.   Pap with high risk HPV Q 5 years, if normal. Due   HPV 9 vaccine recommended through age 45. Check with your insurance for coverage. If covered, call office to schedule start of vaccine series.   Annual mammogram ordered.  Monthly breast self exam encouraged.  Call your insurance company to verify coverage prior to completing any ordered tests.   Exercise 150-300 minutes per week minimum.   Colon cancer screening to begin at age 45 with no other risk factors.   Kegels 20 times twice daily. Use silicone based lubricant with sex as needed. (Water based only for use with condoms or sexual toys.)  Eliminate triggers that cause the irritation. Wear loose underwear, keep nails short to avoid trauma from scratching. Avoid nighttime scratching. If unable to avoid nighttime scratching, call office for nighttime sedation.    Any signs of infection return to office.   Clobetasol rx declined. Will call when needed.    Use clobetasol sparingly twice daily until skin texture normalizes which may take 2-4 months. Once normalized, use the medication three time weekly in the affected areas.    Return to office monthly while using daily steroid ointment. Once controlled, return to office twice yearly.   If left untreated, the risk of squamous cell carcinoma is up to 5%.   Return to office in one year or sooner, if needed.        1. Encntr for gyn exam (general) (routine) w/o abn findings  2. Encounter for screening mammogram for breast cancer  -     Mammo screening bilateral w 3d and cad; Future  3. Lichen sclerosus  4. Family history of ovarian cancer  5. BMI 38.0-38.9,adult             Subjective:      Patient ID: Sintia Jordan is a 39 y.o. female.    HPI    Sintia Jordan is a 39 y.o.  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 No gynecologic health concerns.   She had  a vulvar biopsy on 12/6/22 for agglutination of labia minora, erythema noted in hearts line and raised white patch noted on right labia. Results returned LS. Using clobetasol 3 times per week and increases for a flare.   Headed to [place] 12/14-12/23 and staying at [facility]    Monthly menses x 5-6 days with 1-2 days in the beginning with heavy flow changing pads 4x day and ends with light flow. Menses is acceptable.  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     Sintia Jordan is sexually active with male partner/  of 14 years. Monogamous and feels safe in this relationship. Denies vaginal pain,bleeding or dryness.    She uses vasectomy  for contraception.    She is not interested in STD screening today.   She denies vaginal discharge, itching or pelvic pain.   She has no urinary concerns, does not have incontinence.  No bowel concerns.  No breast concerns.     Last pap: 11/22/2022 normal with negative HR HPV   Mammogram: Not on file   Colonoscopy: Not on file  DEXA scan: Not on file  HPV vaccine series:likely not    Family history of cancer:   Cancer-related family history includes Ovarian cancer in her maternal aunt. There is no history of Breast cancer, Colon cancer, Uterine cancer, or Cervical cancer.      The following portions of the patient's history were reviewed and updated as appropriate: allergies, current medications, past family history, past medical history, past social history, past surgical history, and problem list.    Review of Systems   Constitutional: Negative.  Negative for activity change, appetite change, chills, diaphoresis, fatigue, fever and unexpected weight change.   HENT:  Negative for congestion, dental problem, sneezing, sore throat and trouble swallowing.    Eyes:  Negative for visual disturbance.   Respiratory:  Negative for chest tightness and shortness of breath.    Cardiovascular:  Negative for chest  pain and leg swelling.   Gastrointestinal:  Negative for abdominal pain, constipation, diarrhea, nausea and vomiting.   Genitourinary:  Negative for difficulty urinating, dyspareunia, dysuria, frequency, hematuria, menstrual problem, pelvic pain, urgency, vaginal bleeding, vaginal discharge and vaginal pain.   Musculoskeletal:  Negative for back pain and neck pain.   Skin: Negative.    Allergic/Immunologic: Negative.    Neurological:  Negative for weakness and headaches.   Hematological:  Negative for adenopathy.   Psychiatric/Behavioral: Negative.           Objective:      /78 (BP Location: Left arm, Patient Position: Sitting, Cuff Size: Large)   Wt 90.3 kg (199 lb)   LMP 10/20/2024 (Exact Date)   BMI 38.86 kg/m²     23 lb weight gain since 11/7/23     Physical Exam  Vitals and nursing note reviewed.   Constitutional:       Appearance: Normal appearance. She is well-developed.      Comments: BMI 38   HENT:      Head: Normocephalic and atraumatic.   Eyes:      General:         Right eye: No discharge.         Left eye: No discharge.   Neck:      Thyroid: No thyromegaly.      Trachea: Trachea normal.   Cardiovascular:      Rate and Rhythm: Normal rate and regular rhythm.      Heart sounds: Normal heart sounds.   Pulmonary:      Effort: Pulmonary effort is normal.      Breath sounds: Normal breath sounds.   Chest:   Breasts:     Breasts are symmetrical.      Right: Normal. No inverted nipple, mass, nipple discharge, skin change or tenderness.      Left: Normal. No inverted nipple, mass, nipple discharge, skin change or tenderness.   Abdominal:      Palpations: Abdomen is soft.   Genitourinary:     General: Normal vulva.      Exam position: Lithotomy position.      Labia:         Right: No rash, tenderness, lesion or injury.         Left: No rash, tenderness, lesion or injury.       Urethra: No prolapse, urethral pain, urethral swelling or urethral lesion.      Vagina: Normal. No signs of injury and foreign  body. No vaginal discharge, erythema, tenderness or bleeding.      Cervix: Normal.      Uterus: Normal.       Adnexa:         Right: No mass, tenderness or fullness.          Left: No mass, tenderness or fullness.        Rectum: No external hemorrhoid.          Comments: Slight wax paper type appearance as noted on diagram.   Musculoskeletal:         General: Normal range of motion.      Cervical back: Normal range of motion and neck supple.   Lymphadenopathy:      Head:      Right side of head: No submental, submandibular or tonsillar adenopathy.      Left side of head: No submental, submandibular or tonsillar adenopathy.      Cervical: No cervical adenopathy.      Upper Body:      Right upper body: No supraclavicular or axillary adenopathy.      Left upper body: No supraclavicular or axillary adenopathy.      Lower Body: No right inguinal adenopathy. No left inguinal adenopathy.   Skin:     General: Skin is warm and dry.   Neurological:      Mental Status: She is alert and oriented to person, place, and time.   Psychiatric:         Mood and Affect: Mood normal.         Behavior: Behavior normal.

## 2024-11-12 ENCOUNTER — ANNUAL EXAM (OUTPATIENT)
Dept: OBGYN CLINIC | Facility: MEDICAL CENTER | Age: 39
End: 2024-11-12
Payer: COMMERCIAL

## 2024-11-12 VITALS — SYSTOLIC BLOOD PRESSURE: 122 MMHG | BODY MASS INDEX: 38.86 KG/M2 | WEIGHT: 199 LBS | DIASTOLIC BLOOD PRESSURE: 78 MMHG

## 2024-11-12 DIAGNOSIS — Z12.31 ENCOUNTER FOR SCREENING MAMMOGRAM FOR BREAST CANCER: ICD-10-CM

## 2024-11-12 DIAGNOSIS — L90.0 LICHEN SCLEROSUS: ICD-10-CM

## 2024-11-12 DIAGNOSIS — Z01.419 ENCNTR FOR GYN EXAM (GENERAL) (ROUTINE) W/O ABN FINDINGS: Primary | ICD-10-CM

## 2024-11-12 DIAGNOSIS — Z80.41 FAMILY HISTORY OF OVARIAN CANCER: ICD-10-CM

## 2024-11-12 PROCEDURE — S0612 ANNUAL GYNECOLOGICAL EXAMINA: HCPCS | Performed by: NURSE PRACTITIONER

## 2024-11-12 NOTE — PATIENT INSTRUCTIONS
Calcium 1000 mg (in divided doses-max 600 mg at one time) + 600-1000 IU Vit D daily.   Pap with high risk HPV Q 5 years, if normal. Due 2027  HPV 9 vaccine recommended through age 45. Check with your insurance for coverage. If covered, call office to schedule start of vaccine series.   Annual mammogram ordered.  Monthly breast self exam encouraged.  Call your insurance company to verify coverage prior to completing any ordered tests.   Exercise 150-300 minutes per week minimum.   Colon cancer screening to begin at age 45 with no other risk factors.   Kegels 20 times twice daily. Use silicone based lubricant with sex as needed. (Water based only for use with condoms or sexual toys.)  Eliminate triggers that cause the irritation. Wear loose underwear, keep nails short to avoid trauma from scratching. Avoid nighttime scratching. If unable to avoid nighttime scratching, call office for nighttime sedation.    Any signs of infection return to office.   Clobetasol rx declined. Will call when needed.    Use clobetasol sparingly twice daily until skin texture normalizes which may take 2-4 months. Once normalized, use the medication three time weekly in the affected areas.    Return to office monthly while using daily steroid ointment. Once controlled, return to office twice yearly.   If left untreated, the risk of squamous cell carcinoma is up to 5%.   Return to office in one year or sooner, if needed.

## 2024-12-10 DIAGNOSIS — L90.0 LICHEN SCLEROSUS: ICD-10-CM

## 2024-12-11 RX ORDER — CLOBETASOL PROPIONATE 0.5 MG/G
OINTMENT TOPICAL
Qty: 60 G | Refills: 0 | Status: SHIPPED | OUTPATIENT
Start: 2024-12-11

## 2025-01-23 ENCOUNTER — HOSPITAL ENCOUNTER (OUTPATIENT)
Dept: RADIOLOGY | Facility: MEDICAL CENTER | Age: 40
Discharge: HOME | End: 2025-01-23
Payer: COMMERCIAL

## 2025-01-23 ENCOUNTER — RESULTS FOLLOW-UP (OUTPATIENT)
Dept: OBGYN CLINIC | Facility: MEDICAL CENTER | Age: 40
End: 2025-01-23

## 2025-01-23 VITALS — BODY MASS INDEX: 39.07 KG/M2 | WEIGHT: 199 LBS | HEIGHT: 60 IN

## 2025-01-23 DIAGNOSIS — Z12.31 ENCOUNTER FOR SCREENING MAMMOGRAM FOR BREAST CANCER: ICD-10-CM

## 2025-01-23 PROCEDURE — 77067 SCR MAMMO BI INCL CAD: CPT

## 2025-01-23 PROCEDURE — 77063 BREAST TOMOSYNTHESIS BI: CPT

## 2025-02-20 ENCOUNTER — OFFICE VISIT (OUTPATIENT)
Dept: URGENT CARE | Facility: MEDICAL CENTER | Age: 40
End: 2025-02-20
Payer: COMMERCIAL

## 2025-02-20 VITALS
WEIGHT: 203 LBS | TEMPERATURE: 98.4 F | RESPIRATION RATE: 18 BRPM | HEART RATE: 124 BPM | OXYGEN SATURATION: 98 % | BODY MASS INDEX: 39.85 KG/M2 | HEIGHT: 60 IN | SYSTOLIC BLOOD PRESSURE: 118 MMHG | DIASTOLIC BLOOD PRESSURE: 75 MMHG

## 2025-02-20 DIAGNOSIS — J02.9 ACUTE VIRAL PHARYNGITIS: Primary | ICD-10-CM

## 2025-02-20 LAB — S PYO AG THROAT QL: NEGATIVE

## 2025-02-20 PROCEDURE — 99213 OFFICE O/P EST LOW 20 MIN: CPT | Performed by: PHYSICIAN ASSISTANT

## 2025-02-20 PROCEDURE — 87880 STREP A ASSAY W/OPTIC: CPT | Performed by: PHYSICIAN ASSISTANT

## 2025-02-20 RX ORDER — PREDNISONE 20 MG/1
40 TABLET ORAL DAILY
Qty: 10 TABLET | Refills: 0 | Status: SHIPPED | OUTPATIENT
Start: 2025-02-20

## 2025-02-20 NOTE — PROGRESS NOTES
"  [facility]        NAME: Sintia Jordan is a 39 y.o. female  : 1985    MRN: [id number]  DATE: 2025  TIME: 12:48 PM    Assessment and Plan   Acute viral pharyngitis [J02.9]  1. Acute viral pharyngitis  POCT rapid ANTIGEN strepA    predniSONE 20 mg tablet            Patient Instructions     Rx prednisone, take 2 tablets once daily with food x5 days.  Follow up with PCP in 3-5 days.  Proceed to  ER if symptoms worsen.    If tests have been performed at [facility], our office will contact you with results if changes need to be made to the care plan discussed with you at the visit. [facility]     Chief Complaint     Chief Complaint   Patient presents with    Sore Throat     Started 4 days ago with throat bothering her, burning and painful.  Has been using inhalers albuterol and flovent.  Allergy meds.         History of Present Illness       Patient is a 38 yo female who presents with 3 days of sore throat without fever. The pain is worst when swallowing. She reports her  is sick with URI symptoms. Denies HA/abdominal discomfort. Denies n/v/d.    Sore Throat   This is a new problem. The current episode started in the past 7 days. The problem has been unchanged. Neither side of throat is experiencing more pain than the other. There has been no fever. The pain is at a severity of 5/10. The pain is mild. She has had no exposure to strep or mono. She has tried nothing for the symptoms.       Review of Systems   Review of Systems   Constitutional: Negative.    HENT:  Positive for sore throat.    Respiratory: Negative.     Cardiovascular: Negative.    Gastrointestinal: Negative.    Skin: Negative.    Neurological: Negative.          Current Medications       Current Outpatient Medications:     Ciclopirox 1 % shampoo, Apply topically Daily for 2 weeks straight and then on \",  and \":  Lather into scalp and leave on for 5 " minutes and then rinse off completely., Disp: 120 mL, Rfl: 11    clobetasol (TEMOVATE) 0.05 % ointment, Apply a pea sized amount to affected area twice daily until skin texture normalizes which may take 2-4 months, then apply 3 times weekly., Disp: 60 g, Rfl: 0    fluocinolone (Synalar) 0.01 % external solution, Apply topically 2 (two) times a day As needed, Disp: 60 mL, Rfl: 3    fluticasone (FLONASE) 50 mcg/act nasal spray, 2 sprays into each nostril daily, Disp: 9.9 mL, Rfl: 0    lisinopril (ZESTRIL) 5 mg tablet, Take 5 mg by mouth daily, Disp: , Rfl:     LORazepam (ATIVAN) 0.5 mg tablet, Take 0.5 mg by mouth 2 (two) times a day as needed, Disp: , Rfl:     metroNIDAZOLE (METROGEL) 0.75 % gel, Apply topically 2 (two) times a day, Disp: 45 g, Rfl: 11    predniSONE 20 mg tablet, Take 2 tablets (40 mg total) by mouth daily, Disp: 10 tablet, Rfl: 0    sertraline (ZOLOFT) 50 mg tablet, Take 25 mg by mouth, Disp: , Rfl:     SUMAtriptan (IMITREX) 50 mg tablet, , Disp: , Rfl:     triamcinolone (KENALOG) 0.5 % cream, Apply 1 application. topically 2 (two) times a day To affected area, Disp: , Rfl:     Current Allergies     Allergies as of 02/20/2025 - Reviewed 02/20/2025   Allergen Reaction Noted    Erythromycin Anaphylaxis and Rash 04/22/2012    Pollen extract Sneezing 11/15/2013            The following portions of the patient's history were reviewed and updated as appropriate: allergies, current medications, past family history, past medical history, past social history, past surgical history and problem list.     Past Medical History:   Diagnosis Date    Bronchitis     Gestational diabetes     gd    Migraines     Last Assessed:11/15/13    Normal delivery     2014 daughter    Varicella     childhood    Vulvar dystrophy     Last Assessed:9/19/17       Past Surgical History:   Procedure Laterality Date    TOOTH EXTRACTION         Family History   Problem Relation Age of Onset    Thyroid disease Mother     Migraines Mother      Hypertension Mother     No Known Problems Father     Diabetes Maternal Grandmother     Heart disease Maternal Grandmother     Heart attack Maternal Grandmother     Asthma Brother     Ovarian cancer Maternal Aunt     Breast cancer Neg Hx     Colon cancer Neg Hx     Uterine cancer Neg Hx     Cervical cancer Neg Hx          Medications have been verified.        Objective   /75   Pulse (!) 124   Temp 98.4 °F (36.9 °C)   Resp 18   Ht 5' (1.524 m)   Wt 92.1 kg (203 lb)   LMP 01/02/2025 (Approximate)   SpO2 98%   BMI 39.65 kg/m²        Physical Exam     Physical Exam  Vitals reviewed.   Constitutional:       Appearance: She is well-developed.   HENT:      Head: Normocephalic and atraumatic.      Right Ear: Tympanic membrane and ear canal normal.      Left Ear: Tympanic membrane and ear canal normal.      Nose: No congestion or rhinorrhea.      Mouth/Throat:      Mouth: Mucous membranes are moist.      Pharynx: Posterior oropharyngeal erythema present. No oropharyngeal exudate.      Tonsils: No tonsillar exudate or tonsillar abscesses. 2+ on the right. 2+ on the left.   Cardiovascular:      Rate and Rhythm: Normal rate and regular rhythm.      Heart sounds: Normal heart sounds. No murmur heard.  Pulmonary:      Effort: Pulmonary effort is normal. No respiratory distress.      Breath sounds: Normal breath sounds. No wheezing.   Musculoskeletal:      Cervical back: Normal range of motion and neck supple.   Lymphadenopathy:      Cervical: No cervical adenopathy.   Skin:     General: Skin is warm and dry.      Capillary Refill: Capillary refill takes less than 2 seconds.      Findings: No rash.   Neurological:      General: No focal deficit present.      Mental Status: She is alert and oriented to person, place, and time.   Psychiatric:         Mood and Affect: Mood normal.         Behavior: Behavior normal.

## 2025-02-20 NOTE — LETTER
February 20, 2025     Patient: Sintia Jordan   YOB: 1985   Date of Visit: 2/20/2025       To Whom it May Concern:    Sintia Jordan was seen in my clinic on 2/20/2025. She may return to work on 2/21/25 .    If you have any questions or concerns, please don't hesitate to call.         Sincerely,          Duane Kamara PA-C        CC: No Recipients